# Patient Record
Sex: FEMALE | Race: OTHER | HISPANIC OR LATINO | URBAN - METROPOLITAN AREA
[De-identification: names, ages, dates, MRNs, and addresses within clinical notes are randomized per-mention and may not be internally consistent; named-entity substitution may affect disease eponyms.]

---

## 2018-10-24 PROBLEM — Z00.00 ENCOUNTER FOR PREVENTIVE HEALTH EXAMINATION: Status: ACTIVE | Noted: 2018-10-24

## 2018-10-29 ENCOUNTER — OUTPATIENT (OUTPATIENT)
Dept: OUTPATIENT SERVICES | Facility: HOSPITAL | Age: 53
LOS: 1 days | Discharge: HOME | End: 2018-10-29
Payer: MEDICAID

## 2018-10-31 ENCOUNTER — APPOINTMENT (OUTPATIENT)
Dept: RADIATION ONCOLOGY | Facility: CLINIC | Age: 53
End: 2018-10-31
Payer: MEDICAID

## 2018-10-31 VITALS
BODY MASS INDEX: 26.39 KG/M2 | HEART RATE: 81 BPM | OXYGEN SATURATION: 95 % | HEIGHT: 62 IN | WEIGHT: 143.41 LBS | RESPIRATION RATE: 18 BRPM | SYSTOLIC BLOOD PRESSURE: 117 MMHG | DIASTOLIC BLOOD PRESSURE: 77 MMHG

## 2018-10-31 DIAGNOSIS — Z90.49 ACQUIRED ABSENCE OF OTHER SPECIFIED PARTS OF DIGESTIVE TRACT: ICD-10-CM

## 2018-10-31 DIAGNOSIS — Z78.9 OTHER SPECIFIED HEALTH STATUS: ICD-10-CM

## 2018-10-31 PROCEDURE — 99204 OFFICE O/P NEW MOD 45 MIN: CPT | Mod: 25

## 2018-11-01 ENCOUNTER — OUTPATIENT (OUTPATIENT)
Dept: OUTPATIENT SERVICES | Facility: HOSPITAL | Age: 53
LOS: 1 days | Discharge: HOME | End: 2018-11-01

## 2018-11-01 ENCOUNTER — OUTPATIENT (OUTPATIENT)
Dept: OUTPATIENT SERVICES | Facility: HOSPITAL | Age: 53
LOS: 1 days | End: 2018-11-01
Payer: COMMERCIAL

## 2018-11-01 DIAGNOSIS — C53.9 MALIGNANT NEOPLASM OF CERVIX UTERI, UNSPECIFIED: ICD-10-CM

## 2018-11-02 ENCOUNTER — OTHER (OUTPATIENT)
Age: 53
End: 2018-11-02

## 2018-11-04 PROBLEM — Z90.49 HISTORY OF CHOLECYSTECTOMY: Status: RESOLVED | Noted: 2018-11-04 | Resolved: 2018-11-04

## 2018-11-04 NOTE — VITALS
[Least Pain Intensity: 0/10] : 0/10 [Pain Description/Quality: ___] : Pain description/quality: [unfilled] [Pain Duration: ___] : Pain duration: [unfilled] [Pain Location: ___] : Pain Location: [unfilled] [Pain Interferes with ADLs] : Pain interferes with activities of daily living. [OTC] : OTC [80: Normal activity with effort; some signs or symptoms of disease.] : 80: Normal activity with effort; some signs or symptoms of disease.  [Maximal Pain Intensity: 5/10] : 5/10 [ECOG Performance Status: 2 - Ambulatory and capable of all self care but unable to carry out any work activities] : Performance Status: 2 - Ambulatory and capable of all self care but unable to carry out any work activities. Up and about more than 50% of waking hours

## 2018-11-04 NOTE — REASON FOR VISIT
[Consideration of Curative Therapy] : consideration of curative therapy for [Other: ___] : [unfilled] [Other: _____] : [unfilled]

## 2018-11-04 NOTE — PHYSICAL EXAM
[General Appearance - Alert] : alert [General Appearance - In No Acute Distress] : in no acute distress [Sclera] : the sclera and conjunctiva were normal [Extraocular Movements] : extraocular movements were intact [Heart Sounds] : normal S1 and S2 [Edema] : no peripheral edema present [Abdomen Soft] : soft [Abdomen Tenderness] : non-tender [] : no hepato-splenomegaly [Normal External Genitalia] : normal external genitalia  [Cervical Lymph Nodes Enlarged Posterior Bilaterally] : posterior cervical [Cervical Lymph Nodes Enlarged Anterior Bilaterally] : anterior cervical [Supraclavicular Lymph Nodes Enlarged Bilaterally] : supraclavicular [Axillary Lymph Nodes Enlarged Bilaterally] : axillary [Femoral Lymph Nodes Enlarged Bilaterally] : femoral [Inguinal Lymph Nodes Enlarged Bilaterally] : inguinal [Normal] : normal spine exam without palpable tenderness, no kyphosis or scoliosis [Motor Tone] : muscle strength and tone were normal [No Focal Deficits] : no focal deficits [de-identified] : friable >5 cm cervical mass with at least left pelvic side wall involvement and proximal upper vaginal involvement; rectovaginal exam is negative;

## 2018-11-04 NOTE — HISTORY OF PRESENT ILLNESS
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  She is accompanied by her son Billy.\par \par Patient reports prolonged period in September associated with continued vaginal bleeding, pelvic cramping and sticky yellow discharge with clots.  She went to gynecology clinic and was referred to Dr. Carter for further evaluation.  Pathology of "tissue from cervix" dated 10/12/18 showed squamous cell carcinoma.  On exam by Dr. Carter on 10/18/18, there was a 6 cm firm and friable cervix with possible vaginal involvement at 5:00 and L USL replaced by tumor extending to the side wall, consistent with at least clinical stage IIIB disease.  Dr. Carter referred patient here for further evaluation and management.\par \par Patient reports continuing vaginal staining / discharge, clots, and abdominal / lower left quadrant cramping.  She denies back pain or hematuria or rectal bleeding.  She denies changes in her bowel habits.  She admits to weight loss of at least 10 lbs due to decreased appetite.\par

## 2018-11-04 NOTE — REVIEW OF SYSTEMS
[Dysmenorrhea/Abn Vaginal Bleeding] : dysmenorrhea/abnormal vaginal bleeding [Negative] : Heme/Lymph [Constipation: Grade 0] : Constipation: Grade 0 [Diarrhea: Grade 0] : Diarrhea: Grade 0 [Fatigue: Grade 1 - Fatigue relieved by rest] : Fatigue: Grade 1 - Fatigue relieved by rest [Hematuria: Grade 0] : Hematuria: Grade 0 [Fatigue] : fatigue [Recent Change In Weight] : ~T recent weight change [Abdominal Pain] : abdominal pain [Vaginal Discharge] : vaginal discharge [Fever] : no fever [Chills] : no chills [Night Sweats] : no night sweats [Vomiting] : no vomiting [Constipation] : no constipation [Diarrhea] : no diarrhea [FreeTextEntry2] : weight loss of at least 10 lbs

## 2018-11-05 ENCOUNTER — OUTPATIENT (OUTPATIENT)
Dept: OUTPATIENT SERVICES | Facility: HOSPITAL | Age: 53
LOS: 1 days | Discharge: HOME | End: 2018-11-05

## 2018-11-05 ENCOUNTER — APPOINTMENT (OUTPATIENT)
Dept: HEMATOLOGY ONCOLOGY | Facility: CLINIC | Age: 53
End: 2018-11-05

## 2018-11-05 VITALS
DIASTOLIC BLOOD PRESSURE: 72 MMHG | WEIGHT: 140.85 LBS | HEART RATE: 86 BPM | TEMPERATURE: 98.5 F | RESPIRATION RATE: 18 BRPM | BODY MASS INDEX: 25.77 KG/M2 | OXYGEN SATURATION: 100 % | SYSTOLIC BLOOD PRESSURE: 111 MMHG

## 2018-11-05 DIAGNOSIS — E11.9 TYPE 2 DIABETES MELLITUS W/OUT COMPLICATIONS: ICD-10-CM

## 2018-11-05 DIAGNOSIS — C53.9 MALIGNANT NEOPLASM OF CERVIX UTERI, UNSPECIFIED: ICD-10-CM

## 2018-11-06 PROCEDURE — 77470 SPECIAL RADIATION TREATMENT: CPT | Mod: 26

## 2018-11-07 ENCOUNTER — OUTPATIENT (OUTPATIENT)
Dept: OUTPATIENT SERVICES | Facility: HOSPITAL | Age: 53
LOS: 1 days | End: 2018-11-07
Payer: COMMERCIAL

## 2018-11-07 ENCOUNTER — APPOINTMENT (OUTPATIENT)
Dept: NUCLEAR MEDICINE | Facility: IMAGING CENTER | Age: 53
End: 2018-11-07
Payer: MEDICAID

## 2018-11-07 DIAGNOSIS — C53.9 MALIGNANT NEOPLASM OF CERVIX UTERI, UNSPECIFIED: ICD-10-CM

## 2018-11-07 PROCEDURE — 78815 PET IMAGE W/CT SKULL-THIGH: CPT

## 2018-11-07 PROCEDURE — 78815 PET IMAGE W/CT SKULL-THIGH: CPT | Mod: 26,PI

## 2018-11-07 PROCEDURE — A9552: CPT

## 2018-11-08 ENCOUNTER — RESULT REVIEW (OUTPATIENT)
Age: 53
End: 2018-11-08

## 2018-11-08 PROCEDURE — 88321 CONSLTJ&REPRT SLD PREP ELSWR: CPT

## 2018-11-12 ENCOUNTER — RESULT REVIEW (OUTPATIENT)
Age: 53
End: 2018-11-12

## 2018-11-12 ENCOUNTER — APPOINTMENT (OUTPATIENT)
Dept: HEMATOLOGY ONCOLOGY | Facility: CLINIC | Age: 53
End: 2018-11-12

## 2018-11-12 VITALS
SYSTOLIC BLOOD PRESSURE: 112 MMHG | HEART RATE: 86 BPM | WEIGHT: 141.76 LBS | BODY MASS INDEX: 25.93 KG/M2 | OXYGEN SATURATION: 99 % | DIASTOLIC BLOOD PRESSURE: 66 MMHG | TEMPERATURE: 97.6 F | RESPIRATION RATE: 18 BRPM

## 2018-11-12 LAB
ALBUMIN SERPL ELPH-MCNC: 4.4 G/DL — SIGNIFICANT CHANGE UP (ref 3.3–5)
ALP SERPL-CCNC: 89 U/L — SIGNIFICANT CHANGE UP (ref 30–120)
ALT FLD-CCNC: 11 U/L — SIGNIFICANT CHANGE UP (ref 10–45)
ANION GAP SERPL CALC-SCNC: 14 MMOL/L — SIGNIFICANT CHANGE UP (ref 5–17)
AST SERPL-CCNC: 18 U/L — SIGNIFICANT CHANGE UP (ref 10–40)
BILIRUB SERPL-MCNC: 0.2 MG/DL — SIGNIFICANT CHANGE UP (ref 0.2–1.2)
BUN SERPL-MCNC: 8 MG/DL — SIGNIFICANT CHANGE UP (ref 7–23)
CALCIUM SERPL-MCNC: 9.1 MG/DL — SIGNIFICANT CHANGE UP (ref 8.4–10.5)
CHLORIDE SERPL-SCNC: 101 MMOL/L — SIGNIFICANT CHANGE UP (ref 96–108)
CO2 SERPL-SCNC: 23 MMOL/L — SIGNIFICANT CHANGE UP (ref 22–31)
CREAT SERPL-MCNC: 0.51 MG/DL — SIGNIFICANT CHANGE UP (ref 0.5–1.3)
FERRITIN SERPL-MCNC: 8 NG/ML — LOW (ref 15–150)
GLUCOSE SERPL-MCNC: 92 MG/DL — SIGNIFICANT CHANGE UP (ref 70–99)
HBV CORE AB SER-ACNC: SIGNIFICANT CHANGE UP
HBV SURFACE AB SER-ACNC: SIGNIFICANT CHANGE UP
HBV SURFACE AG SER-ACNC: SIGNIFICANT CHANGE UP
HCT VFR BLD CALC: 32.3 % — LOW (ref 34.5–45)
HCV AB S/CO SERPL IA: 0.12 S/CO — SIGNIFICANT CHANGE UP
HCV AB SERPL-IMP: SIGNIFICANT CHANGE UP
HGB BLD-MCNC: 10.6 G/DL — LOW (ref 11.5–15.5)
IRON SATN MFR SERPL: 24 UG/DL — LOW (ref 30–160)
IRON SATN MFR SERPL: 6 % — LOW (ref 14–50)
MCHC RBC-ENTMCNC: 27.1 PG — SIGNIFICANT CHANGE UP (ref 27–34)
MCHC RBC-ENTMCNC: 32.9 G/DL — SIGNIFICANT CHANGE UP (ref 32–36)
MCV RBC AUTO: 82.2 FL — SIGNIFICANT CHANGE UP (ref 80–100)
PLATELET # BLD AUTO: 344 K/UL — SIGNIFICANT CHANGE UP (ref 150–400)
POTASSIUM SERPL-MCNC: 4.1 MMOL/L — SIGNIFICANT CHANGE UP (ref 3.5–5.3)
POTASSIUM SERPL-SCNC: 4.1 MMOL/L — SIGNIFICANT CHANGE UP (ref 3.5–5.3)
PROT SERPL-MCNC: 7.7 G/DL — SIGNIFICANT CHANGE UP (ref 6–8.3)
RBC # BLD: 3.93 M/UL — SIGNIFICANT CHANGE UP (ref 3.8–5.2)
RBC # FLD: 12.2 % — SIGNIFICANT CHANGE UP (ref 10.3–14.5)
SODIUM SERPL-SCNC: 138 MMOL/L — SIGNIFICANT CHANGE UP (ref 135–145)
TIBC SERPL-MCNC: 430 UG/DL — SIGNIFICANT CHANGE UP (ref 220–430)
UIBC SERPL-MCNC: 406 UG/DL — HIGH (ref 110–370)
WBC # BLD: 9.7 K/UL — SIGNIFICANT CHANGE UP (ref 3.8–10.5)
WBC # FLD AUTO: 9.7 K/UL — SIGNIFICANT CHANGE UP (ref 3.8–10.5)

## 2018-11-12 PROCEDURE — 77263 THER RADIOLOGY TX PLNG CPLX: CPT

## 2018-11-13 LAB — HIV1+2 AB SPEC QL IA.RAPID: NONREACTIVE

## 2018-11-14 NOTE — HISTORY OF PRESENT ILLNESS
[Disease: _____________________] : Disease: [unfilled] [T: ___] : T[unfilled] [N: ___] : N[unfilled] [M: ___] : M[unfilled] [AJCC Stage: ____] : AJCC Stage: [unfilled] [Date: ____________] : Patient's last distress assessment performed on [unfilled]. [9 - Distress Level] : Distress Level: 9 [Referred Patient  to social work for follow-up] : Patient was referred to social work for follow-up [Patient given social work contact information and resource sheet] : Patient was given social work contact information and resource sheet [de-identified] : Gyn: Dr. Emilia Carter\par Radiation: Dr. Yvonne Mcmahan\par \par History taken from patient. Translate by son, Billy. Patient declined [facility] . \par \par Patient has h/o DM on Metformin, who was doing well until 8/2018 when patient had menorrhagia. She had continued bleeding, blood clot, abdominal pain and pelvic cramping with pressure until end of 9/2018 for which she presented to the [facility] ED in [place]. Patient states she had USG abdomen which showed mass, after which she was referred to Gynecologist who noted a friable cervix on examination s/p biopsy, who referred her to Dr. Carter for further evaluation. Pathology of "tissue from cervix" dated 10/12/18 showed squamous cell carcinoma. On exam by Dr. Carter on 10/18/18, there was a 6 cm firm and friable cervix with minute cervical rim at vaginal margin with possible vaginal involvement at 5:00 and L USL replaced by tumor extending to the side wall/ R USL firm, fixed and smooth, consistent with at least clinical stage IIIB disease. Dr. Carter referred patient to rad/onc for management. Patient was evaluated by Dr. Mcmahan and referred her to medical oncology. She had PET scan done on 1/7/18 with no evidence of metastatic disease.   [de-identified] : Squamous cell carcinoma [de-identified] : Patient continues to have pelvic pain and cramps, for which she takes Tylenol which helps with her pain. She also continues to have intermittent vaginal bleeding. She started having menstrual bleeding, which is heavy with clots. She continues to have decreased appetite and has unintentional weight loss of 8-10 lbs in 3 months. She also complaints of fatigue but is able to carry out ADL/IDL activities. \par Bowel and bladder habits are normal. \par  [FreeTextEntry7] : Patient states he has been in contact with SW from rad/onc.

## 2018-11-14 NOTE — ASSESSMENT
[FreeTextEntry1] : Ms. Leggett is a 52 y/o F with h/o DM on Metformin, who was doing well until 8/2018 when patient had menorrhagia. She had continued bleeding, blood clot, abdominal pain and pelvic cramping with pressure until end of 9/2018 for which she presented to the [facility] ED in [place]. Patient states she had USG abdomen which showed mass, after which she was referred to Gynecologist, who referred her to Dr. Carter for further evaluation. Pathology of "tissue from cervix" dated 10/12/18 showed squamous cell carcinoma. On exam by Dr. Carter on 10/18/18, there was a 6 cm firm and friable cervix with possible vaginal involvement at 5:00 and L USL replaced by tumor extending to the side wall, consistent with at least clinical stage IIIB disease. Dr. Carter referred patient to rad/onc for management. Patient was evaluated by Dr. Mcmahan and referred her to medical oncology. Patient had a PET scan which showed no evidence of metastatic disease. \par \par 1. Cervical Cancer:\par -Patient clinical Stage IIIB with left sided pelvic USL extension. PET scan showed no evidence of metastatic disease. D/W patient and son, the pathology findings and plan for concurrent chemoradiation with Cisplatin. Risk, benefit and alternative discussed including but not limited to fatigue, kidney problem, electrolyte imbalance, nausea/vomiting, they agree and consent signed. \par -To follow with Dr. Mcmahan for radiation planning. \par -Continue to follow with Gyn/Onc, patient wants to transfer all her cancer related care at [facility], will refer to gyn/onc, information provided. Patient to make appointment. \par -CBC, CMP, Hepatitis, HIV today.\par -Dietary counseling. \par -Tylenol as needed for pain. \par -Plan discussed and questions answered per their apparent satisfaction. \par \par 2. Anemia:\par -Likely iron deficiency from blood loss. Will get anemia w/u. Start on ferrous sulphate with ascorbic acid.\par -Will plan for IV iron X 2 doses with chemotherapy.\par -Consent obtained.\par \par F/U in 1 week after treatment.\par \par D/W Dr. Mazariegos, following longitudinally with Dr. Menendez.

## 2018-11-14 NOTE — END OF VISIT
[] : Fellow [FreeTextEntry3] : 54yo woman with Stage IIIB with left sided pelvic USL extension, plan for concurrent chemoradiation with Cisplatin. To see rad onc and coordinate initiation of treatment. Labwork today, gyn onc referral, possible IV iron in near future.\par

## 2018-11-14 NOTE — REVIEW OF SYSTEMS
[Fatigue] : fatigue [Recent Change In Weight] : ~T recent weight change [Vaginal Discharge] : vaginal discharge [Dysmenorrhea/Abn Vaginal Bleeding] : dysmenorrhea/abnormal vaginal bleeding [Negative] : Allergic/Immunologic [Fever] : no fever [Chills] : no chills [Night Sweats] : no night sweats [Chest Pain] : no chest pain [Palpitations] : no palpitations [Leg Claudication] : no intermittent leg claudication [Lower Ext Edema] : no lower extremity edema [Shortness Of Breath] : no shortness of breath [Wheezing] : no wheezing [Cough] : no cough [SOB on Exertion] : no shortness of breath during exertion [Abdominal Pain] : no abdominal pain [Vomiting] : no vomiting [Constipation] : no constipation [Diarrhea] : no diarrhea [Dysuria] : no dysuria [Incontinence] : no incontinence

## 2018-11-14 NOTE — RESULTS/DATA
[FreeTextEntry1] : XAM:  PETCT SKUL-THI ONC FDG INIT  \par \par \par PROCEDURE DATE:  11/07/2018  \par  \par \par \par INTERPRETATION:  PROCEDURE:  PET/CT SKULL BASE-MID THIGH IMAGING \par \par CLINICAL INFORMATION: 53-year-old female with newly diagnosed cervical \par squamous cell cancer. Evaluate for metastatic disease. PET/CT is done as \par part of the initial treatment strategy evaluation.\par \par RADIOPHARMACEUTICAL:  9.96 mCi F-18, FDG, I.V.\par \par TECHNIQUE:  Fasting blood sugar measured prior to injection of \par radiopharmaceutical was 92 mg/dl. Following intravenous injection of the \par radiopharmaceutical and an uptake period of approximately 55 minutes, \par FDG-PET/CT was obtained on a Highlighter Discovery 710 from skull base to mid \par thigh. Oral contrast was given during the uptake period. CT was performed \par during shallow respiration. The CT protocol was optimized for PET \par attenuation correction and anatomic localization. The CT protocol was not \par designed to produce and cannot replace state-of-the-art diagnostic CT \par images with specific imaging protocols for different body parts and \par indications. Images were reviewed on a dedicated workstation using \par multiplanar reconstruction.\par \par The standardized uptake values (SUV) are normalized to patient body \par weight and indicate the highest activity concentration (SUVmax) in a \par given disease site. All image numbers refer to axial image number.\par \par COMPARISON:  No prior FDG-PET/CT is available.\par \par OTHER STUDIES USED FOR CORRELATION: None available.\par \par FINDINGS: \par \par HEAD/NECK: Physiologic FDG activity in visualized brain, head, and neck.  \par \par CHEST: No abnormal FDG activity. No lymphadenopathy.     \par \par LUNGS: No abnormal FDG activity. No lung nodule. \par \par PLEURA/PERICARDIUM: No abnormal FDG activity. No effusion.    \par \par HEPATOBILIARY/PANCREAS:  Physiologic FDG activity. Liver SUV mean is 2.1.\par \par SPLEEN: Diffuse, mild probably increased FDG activity in the spleen which \par is more intense than hepatic uptake of FDG (SUV max 4.1; SUV mean 3.1). \par Normal in size.    \par \par ADRENAL GLANDS: No abnormal FDG activity. No nodule.\par \par KIDNEYS/URINARY BLADDER: Physiologic excreted FDG activity.\par \par REPRODUCTIVE ORGANS: A hypermetabolic mass in the uterine cervix measures \par approximately 5.5 cm in maximum transverse diameter (SUV 20.2; image \par 194). Hypermetabolism extends into the lower uterine segment which is not \par well evaluated on CT in the absence of intravenous contrast.\par \par ABDOMINOPELVIC NODES: No enlarged or FDG-avid lymph node.    \par \par BOWEL/PERITONEUM/MESENTERY: Pancolonic hypermetabolism, without \par corresponding abnormality on CT, probably is secondary to metformin.\par \par BONES/SOFT TISSUES: Mild, diffuse bone marrow hypermetabolism in the \par axial and proximal appendicular skeleton, without corresponding \par abnormalities on CT. For reference, L4 demonstrates SUV 4.8 (image 161).\par \par IMPRESSION: Abnormal FDG-PET/CT scan.\par \par 1. Hypermetabolic cervical mass with extension of hypermetabolism into \par lower uterine segment, corresponds to known cervical carcinoma. Pelvic \par MRI may be obtained to further delineate the lesion.\par \par 2. Nonspecific diffuse splenic and bone marrow hypermetabolism may be \par secondary to anemia.\par \par 3. Nonspecific pancolonic hypermetabolism may be secondary to metformin \par therapy.\par \par 4. No evidence of metastatic disease.\par \par ZAKI PELAEZ M.D., NUCLEAR MEDICINE ATTENDING \par This document has been electronically signed. Nov 8 2018 10:58AM\par   \par \par   \par

## 2018-11-14 NOTE — REASON FOR VISIT
[Follow-Up Visit] : a follow-up [Initial Consultation] : an initial consultation [FreeTextEntry2] : Cervical Cancer

## 2018-11-16 ENCOUNTER — INPATIENT (INPATIENT)
Facility: HOSPITAL | Age: 53
LOS: 5 days | Discharge: HOME | End: 2018-11-22
Attending: OBSTETRICS & GYNECOLOGY | Admitting: OBSTETRICS & GYNECOLOGY
Payer: MEDICAID

## 2018-11-16 VITALS
TEMPERATURE: 100 F | HEART RATE: 102 BPM | SYSTOLIC BLOOD PRESSURE: 103 MMHG | OXYGEN SATURATION: 87 % | RESPIRATION RATE: 20 BRPM | DIASTOLIC BLOOD PRESSURE: 54 MMHG

## 2018-11-16 LAB
ALBUMIN SERPL ELPH-MCNC: 3.6 G/DL — SIGNIFICANT CHANGE UP (ref 3.3–5)
ALP SERPL-CCNC: 69 U/L — SIGNIFICANT CHANGE UP (ref 40–120)
ALT FLD-CCNC: 19 U/L — SIGNIFICANT CHANGE UP (ref 4–33)
APTT BLD: 24.7 SEC — LOW (ref 27.5–36.3)
AST SERPL-CCNC: 24 U/L — SIGNIFICANT CHANGE UP (ref 4–32)
BASOPHILS # BLD AUTO: 0.01 K/UL — SIGNIFICANT CHANGE UP (ref 0–0.2)
BASOPHILS NFR BLD AUTO: 0.1 % — SIGNIFICANT CHANGE UP (ref 0–2)
BILIRUB SERPL-MCNC: < 0.2 MG/DL — LOW (ref 0.2–1.2)
BLD GP AB SCN SERPL QL: NEGATIVE — SIGNIFICANT CHANGE UP
BUN SERPL-MCNC: 6 MG/DL — LOW (ref 7–23)
CALCIUM SERPL-MCNC: 8.5 MG/DL — SIGNIFICANT CHANGE UP (ref 8.4–10.5)
CHLORIDE SERPL-SCNC: 103 MMOL/L — SIGNIFICANT CHANGE UP (ref 98–107)
CO2 SERPL-SCNC: 23 MMOL/L — SIGNIFICANT CHANGE UP (ref 22–31)
CREAT SERPL-MCNC: 0.52 MG/DL — SIGNIFICANT CHANGE UP (ref 0.5–1.3)
EOSINOPHIL # BLD AUTO: 0 K/UL — SIGNIFICANT CHANGE UP (ref 0–0.5)
EOSINOPHIL NFR BLD AUTO: 0 % — SIGNIFICANT CHANGE UP (ref 0–6)
GLUCOSE SERPL-MCNC: 131 MG/DL — HIGH (ref 70–99)
HCG SERPL-ACNC: < 5 MIU/ML — SIGNIFICANT CHANGE UP
HCT VFR BLD CALC: 19.8 % — CRITICAL LOW (ref 34.5–45)
HGB BLD-MCNC: 6.5 G/DL — CRITICAL LOW (ref 11.5–15.5)
IMM GRANULOCYTES # BLD AUTO: 0.06 # — SIGNIFICANT CHANGE UP
IMM GRANULOCYTES NFR BLD AUTO: 0.8 % — SIGNIFICANT CHANGE UP (ref 0–1.5)
INR BLD: 1.13 — SIGNIFICANT CHANGE UP (ref 0.88–1.17)
LYMPHOCYTES # BLD AUTO: 1.35 K/UL — SIGNIFICANT CHANGE UP (ref 1–3.3)
LYMPHOCYTES # BLD AUTO: 17.6 % — SIGNIFICANT CHANGE UP (ref 13–44)
MCHC RBC-ENTMCNC: 27.8 PG — SIGNIFICANT CHANGE UP (ref 27–34)
MCHC RBC-ENTMCNC: 32.8 % — SIGNIFICANT CHANGE UP (ref 32–36)
MCV RBC AUTO: 84.6 FL — SIGNIFICANT CHANGE UP (ref 80–100)
MONOCYTES # BLD AUTO: 0.94 K/UL — HIGH (ref 0–0.9)
MONOCYTES NFR BLD AUTO: 12.3 % — SIGNIFICANT CHANGE UP (ref 2–14)
NEUTROPHILS # BLD AUTO: 5.29 K/UL — SIGNIFICANT CHANGE UP (ref 1.8–7.4)
NEUTROPHILS NFR BLD AUTO: 69.2 % — SIGNIFICANT CHANGE UP (ref 43–77)
NRBC # FLD: 0 — SIGNIFICANT CHANGE UP
PLATELET # BLD AUTO: 231 K/UL — SIGNIFICANT CHANGE UP (ref 150–400)
PMV BLD: 10.2 FL — SIGNIFICANT CHANGE UP (ref 7–13)
POTASSIUM SERPL-MCNC: 3.7 MMOL/L — SIGNIFICANT CHANGE UP (ref 3.5–5.3)
POTASSIUM SERPL-SCNC: 3.7 MMOL/L — SIGNIFICANT CHANGE UP (ref 3.5–5.3)
PROT SERPL-MCNC: 6.8 G/DL — SIGNIFICANT CHANGE UP (ref 6–8.3)
PROTHROM AB SERPL-ACNC: 12.6 SEC — SIGNIFICANT CHANGE UP (ref 9.8–13.1)
RBC # BLD: 2.34 M/UL — LOW (ref 3.8–5.2)
RBC # FLD: 13.2 % — SIGNIFICANT CHANGE UP (ref 10.3–14.5)
RH IG SCN BLD-IMP: POSITIVE — SIGNIFICANT CHANGE UP
RH IG SCN BLD-IMP: POSITIVE — SIGNIFICANT CHANGE UP
SODIUM SERPL-SCNC: 136 MMOL/L — SIGNIFICANT CHANGE UP (ref 135–145)
WBC # BLD: 7.65 K/UL — SIGNIFICANT CHANGE UP (ref 3.8–10.5)
WBC # FLD AUTO: 7.65 K/UL — SIGNIFICANT CHANGE UP (ref 3.8–10.5)

## 2018-11-16 RX ORDER — SODIUM CHLORIDE 9 MG/ML
1000 INJECTION, SOLUTION INTRAVENOUS ONCE
Qty: 0 | Refills: 0 | Status: COMPLETED | OUTPATIENT
Start: 2018-11-16 | End: 2018-11-16

## 2018-11-16 RX ORDER — ONDANSETRON 8 MG/1
4 TABLET, FILM COATED ORAL ONCE
Qty: 0 | Refills: 0 | Status: COMPLETED | OUTPATIENT
Start: 2018-11-16 | End: 2018-11-16

## 2018-11-16 RX ORDER — FENTANYL CITRATE 50 UG/ML
50 INJECTION INTRAVENOUS ONCE
Qty: 0 | Refills: 0 | Status: DISCONTINUED | OUTPATIENT
Start: 2018-11-16 | End: 2018-11-16

## 2018-11-16 RX ADMIN — ONDANSETRON 4 MILLIGRAM(S): 8 TABLET, FILM COATED ORAL at 21:33

## 2018-11-16 RX ADMIN — SODIUM CHLORIDE 1000 MILLILITER(S): 9 INJECTION, SOLUTION INTRAVENOUS at 21:33

## 2018-11-16 RX ADMIN — FENTANYL CITRATE 50 MICROGRAM(S): 50 INJECTION INTRAVENOUS at 21:33

## 2018-11-16 NOTE — ED PROVIDER NOTE - OBJECTIVE STATEMENT
53F h/o stage 3 cervical cancer 53F h/o DM and stage 3 cervical cancer dx approx 3 months ago (planning for chemo but has not started yet) p/w heavy vaginal bleeding x 2 days, +clots, a/w dizziness and fatigue. No chest pain, SOB, or syncope. No abd pain, vomiting, or diarrhea. +dysuria, no urinary frequency or urgency. Pt speaks French; offered phone translation services; pt prefers adult son (Sebastian Leggett) present at bedside to translate medical hx.

## 2018-11-16 NOTE — ED PROVIDER NOTE - PHYSICAL EXAMINATION
Gen: Pale, well nourished, A&Ox4, NAD.  ENMT: Airway patent. Moist mucous membranes.  Cardiac: Tachycardic, regular rhythm.  Heart sounds S1, S2.  Respiratory: Breath sounds clear and equal bilaterally. No wheezes/rales/rhonchi.  Abdomen: Abdomen soft, non-distended, no guarding. +suprapubic tenderness.  Musculoskeletal: Atraumatic. Strong peripheral pulses. Extremities are warm and well perfused. No vascular compromise. No CVAT.  Neuro: Alert, follows commands. Speech is clear, fluent, and appropriate. No apparent neuro deficit.  Skin: Skin normal color for race, warm, dry and intact. No evidence of rash.      (chaperone: Paola Lombardo RN): Large external clot, +pooling of blood in vaginal vault. Gen: Pale, A&Ox4, NAD.  ENMT: Airway patent. Moist mucous membranes.  Cardiac: Tachycardic, regular rhythm.  Heart sounds S1, S2.  Respiratory: Breath sounds clear and equal bilaterally. No wheezes/rales/rhonchi.  Abdomen: Abdomen soft, non-distended, no guarding. +suprapubic tenderness.  Musculoskeletal: Atraumatic. Strong peripheral pulses. Extremities are warm and well perfused. No vascular compromise. No CVAT.  Neuro: Alert, follows commands. Speech is clear, fluent, and appropriate. No apparent neuro deficit.  Skin: Skin normal color for race, warm, dry and intact. No evidence of rash.      (chaperone: Paola Lombardo RN): Large external clot, +pooling of blood in vaginal vault.

## 2018-11-16 NOTE — ED ADULT NURSE NOTE - OBJECTIVE STATEMENT
Facilitator RN - Pt. received in room 11, primarily Eritrean speaking and requests son Sebastian Leggett at bedside to translate. Pt. is A&Ox4, ambulatory. Pt. with hx of uterine cancer c/o lower back pain, suprapubic pain, dysuria, and vaginal bleeding with clots that began 2 days ago. Pt. reports saturating about 5-6 pads today accompanied by dizziness, weakness, and nausea. Pt. denies vomiting, diarrhea, chest pain, SOB, fever, chills. Pt. received with 20 gauge IV in left ac placed by EMS. Pt. placed on cardiac monitor. MD Preciado at bedside for evaluation. VS as noted. Report given to primary RN Anna Stokes.

## 2018-11-16 NOTE — ED ADULT TRIAGE NOTE - CHIEF COMPLAINT QUOTE
Patient brought to ER by EMS for c/o lower abdominal and back pain, as well as heavy vaginal bleed. Pt has uterine ca and is being prepped for chemo.

## 2018-11-16 NOTE — ED ADULT NURSE REASSESSMENT NOTE - NS ED NURSE REASSESS COMMENT FT1
pt. a&ox3, appears in NAD, actively bleeding, OB at bedside. awaits T&S results, MD aware. will continue to monitor pt. a&ox3, appears in NAD, actively bleeding, OB at bedside. awaits T&S results, MD aware. will continue to monitor. endorsed to covering RN Crystalla.

## 2018-11-16 NOTE — ED ADULT NURSE NOTE - NSIMPLEMENTINTERV_GEN_ALL_ED
Implemented All Fall Risk Interventions:  [place] to call system. Call bell, personal items and telephone within reach. Instruct patient to call for assistance. Room bathroom lighting operational. Non-slip footwear when patient is off stretcher. Physically safe environment: no spills, clutter or unnecessary equipment. Stretcher in lowest position, wheels locked, appropriate side rails in place. Provide visual cue, wrist band, yellow gown, etc. Monitor gait and stability. Monitor for mental status changes and reorient to person, place, and time. Review medications for side effects contributing to fall risk. Reinforce activity limits and safety measures with patient and family.

## 2018-11-16 NOTE — ED PROVIDER NOTE - MEDICAL DECISION MAKING DETAILS
Pt with known cervical cancer p/w heavy vaginal bleeding, likely bleeding from tumor. Concern for anemia and ongoing bleeding. Plan: labs, hcg, UA, fluids, pRBCs, gyn c/s, admit.

## 2018-11-16 NOTE — ED PROVIDER NOTE - ATTENDING CONTRIBUTION TO CARE
Tate: 53 yof with stage 3 cervical cancer in the prelim stage of chemo/radiation. Pt noted some lower abdominal pain and heavy vaginal bleeding for few days with feeling lightheaded, weak, tired, sob with exertion, no palpitations, no cp. Pt initially tachy to 100, BP stable, pale palms and conj, clear lungs, normal cardiac, abd soft with minimal tn suprapubic, pelvic as above. GYN at bedside now. - hg 6.5, transfusion ordered. awaiting recs.

## 2018-11-17 DIAGNOSIS — N93.9 ABNORMAL UTERINE AND VAGINAL BLEEDING, UNSPECIFIED: ICD-10-CM

## 2018-11-17 DIAGNOSIS — C53.9 MALIGNANT NEOPLASM OF CERVIX UTERI, UNSPECIFIED: ICD-10-CM

## 2018-11-17 LAB
ALBUMIN SERPL ELPH-MCNC: 2.9 G/DL — LOW (ref 3.3–5)
ALP SERPL-CCNC: 59 U/L — SIGNIFICANT CHANGE UP (ref 40–120)
ALT FLD-CCNC: 15 U/L — SIGNIFICANT CHANGE UP (ref 4–33)
APTT BLD: 22.9 SEC — LOW (ref 27.5–36.3)
AST SERPL-CCNC: 22 U/L — SIGNIFICANT CHANGE UP (ref 4–32)
BILIRUB SERPL-MCNC: 0.4 MG/DL — SIGNIFICANT CHANGE UP (ref 0.2–1.2)
BUN SERPL-MCNC: 5 MG/DL — LOW (ref 7–23)
CALCIUM SERPL-MCNC: 7.5 MG/DL — LOW (ref 8.4–10.5)
CHLORIDE SERPL-SCNC: 105 MMOL/L — SIGNIFICANT CHANGE UP (ref 98–107)
CO2 SERPL-SCNC: 23 MMOL/L — SIGNIFICANT CHANGE UP (ref 22–31)
CREAT SERPL-MCNC: 0.54 MG/DL — SIGNIFICANT CHANGE UP (ref 0.5–1.3)
GLUCOSE BLDC GLUCOMTR-MCNC: 110 MG/DL — HIGH (ref 70–99)
GLUCOSE BLDC GLUCOMTR-MCNC: 122 MG/DL — HIGH (ref 70–99)
GLUCOSE BLDC GLUCOMTR-MCNC: 130 MG/DL — HIGH (ref 70–99)
GLUCOSE SERPL-MCNC: 113 MG/DL — HIGH (ref 70–99)
HCT VFR BLD CALC: 26.3 % — LOW (ref 34.5–45)
HCT VFR BLD CALC: 27.3 % — LOW (ref 34.5–45)
HCT VFR BLD CALC: 28.7 % — LOW (ref 34.5–45)
HCT VFR BLD CALC: 28.9 % — LOW (ref 34.5–45)
HCT VFR BLD CALC: 31.2 % — LOW (ref 34.5–45)
HGB BLD-MCNC: 10.3 G/DL — LOW (ref 11.5–15.5)
HGB BLD-MCNC: 9 G/DL — LOW (ref 11.5–15.5)
HGB BLD-MCNC: 9.4 G/DL — LOW (ref 11.5–15.5)
HGB BLD-MCNC: 9.6 G/DL — LOW (ref 11.5–15.5)
HGB BLD-MCNC: 9.8 G/DL — LOW (ref 11.5–15.5)
INR BLD: 1.07 — SIGNIFICANT CHANGE UP (ref 0.88–1.17)
MAGNESIUM SERPL-MCNC: 1.6 MG/DL — SIGNIFICANT CHANGE UP (ref 1.6–2.6)
MCHC RBC-ENTMCNC: 27.5 PG — SIGNIFICANT CHANGE UP (ref 27–34)
MCHC RBC-ENTMCNC: 27.6 PG — SIGNIFICANT CHANGE UP (ref 27–34)
MCHC RBC-ENTMCNC: 27.7 PG — SIGNIFICANT CHANGE UP (ref 27–34)
MCHC RBC-ENTMCNC: 27.8 PG — SIGNIFICANT CHANGE UP (ref 27–34)
MCHC RBC-ENTMCNC: 27.9 PG — SIGNIFICANT CHANGE UP (ref 27–34)
MCHC RBC-ENTMCNC: 33 % — SIGNIFICANT CHANGE UP (ref 32–36)
MCHC RBC-ENTMCNC: 33.4 % — SIGNIFICANT CHANGE UP (ref 32–36)
MCHC RBC-ENTMCNC: 33.9 % — SIGNIFICANT CHANGE UP (ref 32–36)
MCHC RBC-ENTMCNC: 34.2 % — SIGNIFICANT CHANGE UP (ref 32–36)
MCHC RBC-ENTMCNC: 34.4 % — SIGNIFICANT CHANGE UP (ref 32–36)
MCV RBC AUTO: 80.5 FL — SIGNIFICANT CHANGE UP (ref 80–100)
MCV RBC AUTO: 81.4 FL — SIGNIFICANT CHANGE UP (ref 80–100)
MCV RBC AUTO: 81.9 FL — SIGNIFICANT CHANGE UP (ref 80–100)
MCV RBC AUTO: 82.5 FL — SIGNIFICANT CHANGE UP (ref 80–100)
MCV RBC AUTO: 83.2 FL — SIGNIFICANT CHANGE UP (ref 80–100)
NRBC # FLD: 0 — SIGNIFICANT CHANGE UP
PHOSPHATE SERPL-MCNC: 3.8 MG/DL — SIGNIFICANT CHANGE UP (ref 2.5–4.5)
PLATELET # BLD AUTO: 160 K/UL — SIGNIFICANT CHANGE UP (ref 150–400)
PLATELET # BLD AUTO: 166 K/UL — SIGNIFICANT CHANGE UP (ref 150–400)
PLATELET # BLD AUTO: 172 K/UL — SIGNIFICANT CHANGE UP (ref 150–400)
PLATELET # BLD AUTO: 179 K/UL — SIGNIFICANT CHANGE UP (ref 150–400)
PLATELET # BLD AUTO: 181 K/UL — SIGNIFICANT CHANGE UP (ref 150–400)
PMV BLD: 10.1 FL — SIGNIFICANT CHANGE UP (ref 7–13)
PMV BLD: 10.2 FL — SIGNIFICANT CHANGE UP (ref 7–13)
PMV BLD: 10.2 FL — SIGNIFICANT CHANGE UP (ref 7–13)
PMV BLD: 10.6 FL — SIGNIFICANT CHANGE UP (ref 7–13)
PMV BLD: 9.9 FL — SIGNIFICANT CHANGE UP (ref 7–13)
POTASSIUM SERPL-MCNC: 3.8 MMOL/L — SIGNIFICANT CHANGE UP (ref 3.5–5.3)
POTASSIUM SERPL-SCNC: 3.8 MMOL/L — SIGNIFICANT CHANGE UP (ref 3.5–5.3)
PROT SERPL-MCNC: 5.3 G/DL — LOW (ref 6–8.3)
PROTHROM AB SERPL-ACNC: 12.2 SEC — SIGNIFICANT CHANGE UP (ref 9.8–13.1)
RBC # BLD: 3.23 M/UL — LOW (ref 3.8–5.2)
RBC # BLD: 3.39 M/UL — LOW (ref 3.8–5.2)
RBC # BLD: 3.48 M/UL — LOW (ref 3.8–5.2)
RBC # BLD: 3.53 M/UL — LOW (ref 3.8–5.2)
RBC # BLD: 3.75 M/UL — LOW (ref 3.8–5.2)
RBC # FLD: 14.1 % — SIGNIFICANT CHANGE UP (ref 10.3–14.5)
RBC # FLD: 14.3 % — SIGNIFICANT CHANGE UP (ref 10.3–14.5)
RBC # FLD: 14.6 % — HIGH (ref 10.3–14.5)
RBC # FLD: 14.6 % — HIGH (ref 10.3–14.5)
RBC # FLD: 14.9 % — HIGH (ref 10.3–14.5)
SODIUM SERPL-SCNC: 138 MMOL/L — SIGNIFICANT CHANGE UP (ref 135–145)
WBC # BLD: 10.2 K/UL — SIGNIFICANT CHANGE UP (ref 3.8–10.5)
WBC # BLD: 12.41 K/UL — HIGH (ref 3.8–10.5)
WBC # BLD: 8.08 K/UL — SIGNIFICANT CHANGE UP (ref 3.8–10.5)
WBC # BLD: 8.3 K/UL — SIGNIFICANT CHANGE UP (ref 3.8–10.5)
WBC # BLD: 9.32 K/UL — SIGNIFICANT CHANGE UP (ref 3.8–10.5)
WBC # FLD AUTO: 10.2 K/UL — SIGNIFICANT CHANGE UP (ref 3.8–10.5)
WBC # FLD AUTO: 12.41 K/UL — HIGH (ref 3.8–10.5)
WBC # FLD AUTO: 8.08 K/UL — SIGNIFICANT CHANGE UP (ref 3.8–10.5)
WBC # FLD AUTO: 8.3 K/UL — SIGNIFICANT CHANGE UP (ref 3.8–10.5)
WBC # FLD AUTO: 9.32 K/UL — SIGNIFICANT CHANGE UP (ref 3.8–10.5)

## 2018-11-17 PROCEDURE — 36247 INS CATH ABD/L-EXT ART 3RD: CPT | Mod: 59

## 2018-11-17 PROCEDURE — 37244 VASC EMBOLIZE/OCCLUDE BLEED: CPT

## 2018-11-17 PROCEDURE — 76937 US GUIDE VASCULAR ACCESS: CPT | Mod: 26

## 2018-11-17 PROCEDURE — 99232 SBSQ HOSP IP/OBS MODERATE 35: CPT

## 2018-11-17 PROCEDURE — 99223 1ST HOSP IP/OBS HIGH 75: CPT

## 2018-11-17 RX ORDER — INSULIN LISPRO 100/ML
VIAL (ML) SUBCUTANEOUS EVERY 6 HOURS
Qty: 0 | Refills: 0 | Status: DISCONTINUED | OUTPATIENT
Start: 2018-11-17 | End: 2018-11-17

## 2018-11-17 RX ORDER — ACETAMINOPHEN 500 MG
1000 TABLET ORAL ONCE
Qty: 0 | Refills: 0 | Status: COMPLETED | OUTPATIENT
Start: 2018-11-17 | End: 2018-11-17

## 2018-11-17 RX ORDER — IBUPROFEN 200 MG
600 TABLET ORAL ONCE
Qty: 0 | Refills: 0 | Status: COMPLETED | OUTPATIENT
Start: 2018-11-17 | End: 2018-11-17

## 2018-11-17 RX ORDER — HYDROMORPHONE HYDROCHLORIDE 2 MG/ML
1 INJECTION INTRAMUSCULAR; INTRAVENOUS; SUBCUTANEOUS ONCE
Qty: 0 | Refills: 0 | Status: DISCONTINUED | OUTPATIENT
Start: 2018-11-17 | End: 2018-11-17

## 2018-11-17 RX ORDER — HYDROMORPHONE HYDROCHLORIDE 2 MG/ML
0.5 INJECTION INTRAMUSCULAR; INTRAVENOUS; SUBCUTANEOUS ONCE
Qty: 0 | Refills: 0 | Status: DISCONTINUED | OUTPATIENT
Start: 2018-11-17 | End: 2018-11-17

## 2018-11-17 RX ORDER — INSULIN LISPRO 100/ML
VIAL (ML) SUBCUTANEOUS AT BEDTIME
Qty: 0 | Refills: 0 | Status: DISCONTINUED | OUTPATIENT
Start: 2018-11-17 | End: 2018-11-22

## 2018-11-17 RX ORDER — MAGNESIUM SULFATE 500 MG/ML
2 VIAL (ML) INJECTION ONCE
Qty: 0 | Refills: 0 | Status: COMPLETED | OUTPATIENT
Start: 2018-11-17 | End: 2018-11-17

## 2018-11-17 RX ORDER — INSULIN LISPRO 100/ML
VIAL (ML) SUBCUTANEOUS
Qty: 0 | Refills: 0 | Status: DISCONTINUED | OUTPATIENT
Start: 2018-11-17 | End: 2018-11-22

## 2018-11-17 RX ORDER — ENOXAPARIN SODIUM 100 MG/ML
40 INJECTION SUBCUTANEOUS DAILY
Qty: 0 | Refills: 0 | Status: DISCONTINUED | OUTPATIENT
Start: 2018-11-17 | End: 2018-11-22

## 2018-11-17 RX ORDER — SODIUM CHLORIDE 9 MG/ML
1000 INJECTION, SOLUTION INTRAVENOUS
Qty: 0 | Refills: 0 | Status: DISCONTINUED | OUTPATIENT
Start: 2018-11-17 | End: 2018-11-18

## 2018-11-17 RX ADMIN — Medication 400 MILLIGRAM(S): at 12:15

## 2018-11-17 RX ADMIN — Medication 600 MILLIGRAM(S): at 17:09

## 2018-11-17 RX ADMIN — HYDROMORPHONE HYDROCHLORIDE 1 MILLIGRAM(S): 2 INJECTION INTRAMUSCULAR; INTRAVENOUS; SUBCUTANEOUS at 07:28

## 2018-11-17 RX ADMIN — HYDROMORPHONE HYDROCHLORIDE 1 MILLIGRAM(S): 2 INJECTION INTRAMUSCULAR; INTRAVENOUS; SUBCUTANEOUS at 06:41

## 2018-11-17 RX ADMIN — HYDROMORPHONE HYDROCHLORIDE 0.5 MILLIGRAM(S): 2 INJECTION INTRAMUSCULAR; INTRAVENOUS; SUBCUTANEOUS at 03:00

## 2018-11-17 RX ADMIN — ENOXAPARIN SODIUM 40 MILLIGRAM(S): 100 INJECTION SUBCUTANEOUS at 17:09

## 2018-11-17 RX ADMIN — Medication 50 GRAM(S): at 04:14

## 2018-11-17 RX ADMIN — HYDROMORPHONE HYDROCHLORIDE 0.5 MILLIGRAM(S): 2 INJECTION INTRAMUSCULAR; INTRAVENOUS; SUBCUTANEOUS at 02:53

## 2018-11-17 RX ADMIN — Medication 1000 MILLIGRAM(S): at 12:30

## 2018-11-17 RX ADMIN — Medication 400 MILLIGRAM(S): at 23:12

## 2018-11-17 RX ADMIN — Medication 600 MILLIGRAM(S): at 17:37

## 2018-11-17 RX ADMIN — Medication 1000 MILLIGRAM(S): at 23:27

## 2018-11-17 NOTE — PROGRESS NOTE ADULT - ATTENDING COMMENTS
Seen on rounds; son present. Pt looks better, able to sit up. Flow and labs reviewed. Exam - NAD, Pad check; scant droplet noted. Plan cont hairston and obs. Facilitate RT/CT treatment schedule. May need additional RBC to optimize therapy. All Q/A of son, including pain management.

## 2018-11-17 NOTE — H&P ADULT - NSHPLABSRESULTS_GEN_ALL_CORE
EXAM:  PETCT [facility] ONC FDG INIT     PROCEDURE DATE:  11/07/2018       INTERPRETATION:  PROCEDURE:  PET/CT SKULL BASE-MID THIGH IMAGING     CLINICAL INFORMATION: 53-year-old female with newly diagnosed cervical   squamous cell cancer. Evaluate for metastatic disease. PET/CT is done as   part of the initial treatment strategy evaluation.    RADIOPHARMACEUTICAL:  9.96 mCi F-18, FDG, I.V.    TECHNIQUE:  Fasting blood sugar measured prior to injection of   radiopharmaceutical was 92 mg/dl.Following intravenous injection of the   radiopharmaceutical and an uptake period of approximately 55 minutes,   FDG-PET/CT was obtained on a [employer] Discovery 710 from skull base to mid   thigh. Oral contrast was given during the uptake period. CT was performed   during shallow respiration. The CT protocol was optimized for PET   attenuation correction and anatomic localization. The CT protocol was not   designed to produce and cannot replace state-of-the-art diagnostic CT   images with specific imaging protocols for different body parts and   indications. Images were reviewed on a dedicated workstation using   multiplanar reconstruction.    The standardized uptake values (SUV) are normalized to patient body   weight and indicate the highest activity concentration (SUVmax) in a   given disease site. All image numbers refer to axial image number.    COMPARISON:  No prior FDG-PET/CT is available.    OTHER STUDIES USED FOR CORRELATION: None available.    FINDINGS:     HEAD/NECK: Physiologic FDG activity in visualized brain, head, and neck.      CHEST: No abnormal FDG activity. No lymphadenopathy.         LUNGS: No abnormal FDG activity. No lung nodule.     PLEURA/PERICARDIUM: No abnormal FDG activity. No effusion.        HEPATOBILIARY/PANCREAS:  Physiologic FDG activity. Liver SUV mean is 2.1.    SPLEEN: Diffuse, mild probably increased FDG activity in the spleen which   is more intense than hepatic uptake of FDG (SUV max 4.1; SUV mean 3.1).   Normal in size.        ADRENAL GLANDS: No abnormal FDG activity. No nodule.    KIDNEYS/URINARY BLADDER: Physiologic excreted FDG activity.    REPRODUCTIVE ORGANS: A hypermetabolic mass in the uterine cervix measures   approximately 5.5 cm in maximum transverse diameter (SUV 20.2; image   194). Hypermetabolism extends into the lower uterine segment which is not   well evaluated on CT in the absence of intravenous contrast.    ABDOMINOPELVIC NODES: No enlarged or FDG-avid lymph node.        BOWEL/PERITONEUM/MESENTERY: Pancolonic hypermetabolism, without   corresponding abnormality on CT, probably is secondary to metformin.    BONES/SOFT TISSUES: Mild, diffuse bone marrow hypermetabolism in the   axial and proximal appendicular skeleton, without corresponding   abnormalities on CT. For reference, L4 demonstrates SUV 4.8 (image 161).    IMPRESSION: Abnormal FDG-PET/CT scan.    1. Hypermetabolic cervical mass with extension of hypermetabolism into   lower uterine segment, corresponds to known cervical carcinoma. Pelvic   MRI may be obtained to further delineate the lesion.    2. Nonspecific diffuse splenic and bone marrow hypermetabolism may be   secondary to anemia.    3. Nonspecific pancolonic hypermetabolism may be secondary to metformin   therapy.    4. No evidence of metastatic disease.

## 2018-11-17 NOTE — PROGRESS NOTE ADULT - SUBJECTIVE AND OBJECTIVE BOX
R2 GYN Oncology Note    Pt seen and examined at bedside. Pt's son present and able to translate. Pt states that she has b/l lower abdominal cramping that radiates to her thighs b/l, similar to "contraction pain." Pain is moderately controlled with IV pain medication. Pt has not ambulated, NPO, hairston in place.   Pt denies fever, chills, chest pain, SOB, nausea, vomiting, lightheadedness, dizziness.      T(F): 98.3 (11-17-18 @ 04:00), Max: 99.6 (11-16-18 @ 19:46)  HR: 68 (11-17-18 @ 06:00) (66 - 113)  BP: 112/52 (11-17-18 @ 06:00) (101/49 - 138/55)  RR: 17 (11-17-18 @ 06:00) (14 - 22)  SpO2: 98% (11-17-18 @ 06:00) (87% - 100%)  Wt(kg): --  I&O's Summary    16 Nov 2018 07:01  -  17 Nov 2018 07:00  --------------------------------------------------------  IN: 500 mL / OUT: 400 mL / NET: 100 mL        MEDICATIONS  (STANDING):  insulin lispro (HumaLOG) corrective regimen sliding scale   SubCutaneous every 6 hours  lactated ringers. 1000 milliLiter(s) (125 mL/Hr) IV Continuous <Continuous>    MEDICATIONS  (PRN):      Physical Exam:  Constitutional: NAD, sleepy  Pulmonary: clear to auscultation bilaterally   Cardiovascular: Regular rate and rhythm   Abdomen: Soft, tenderness to palpation in LLQ and RLQ b/l, notdistended, no guarding, no rebound, + bowel sounds  Extremities: Dressing in R femoral region c/d/i. no lower extremity edema or calf tenderness. 1+ dorsal pedal pulse in RLE, 1+ dorsal pedal pulse in LLE SCDs in place.   : Scant vaginal spotting on cleopatra and blue towel. Per nurse, pad has not been changed since admission to SICU    LABS:                        10.3   12.41 )-----------( 179      ( 17 Nov 2018 02:45 )             31.2   baso x      eos x      imm gran x      lymph x      mono x      poly x                            6.5    7.65  )-----------( 231      ( 16 Nov 2018 21:15 )             19.8   baso 0.1    eos 0.0    imm gran 0.8    lymph 17.6   mono 12.3   poly 69.2     11-17 @ 02:45    138  |  105  |  5   ----------------------------<  113  3.8   |  23  |  0.54    11-16 @ 21:15    136  |  103  |  6   ----------------------------<  131  3.7   |  23  |  0.52      Phos  3.8     11-17 @ 02:45  Mg     1.6     11-17 @ 02:45    TPro  5.3  /  Alb  2.9  /  TBili  0.4  /  DBili  x   /  AST  22  /  ALT  15  /  AlkPhos  59  11-17 @ 02:45  TPro  6.8  /  Alb  3.6  /  TBili  < 0.2  /  DBili  x   /  AST  24  /  ALT  19  /  AlkPhos  69  11-16 @ 21:15    PT/INR - ( 17 Nov 2018 02:45 )   PT: 12.2 SEC;   INR: 1.07          PTT - ( 17 Nov 2018 02:45 )  PTT:22.9 SEC      RADIOLOGY & ADDITIONAL TESTS:

## 2018-11-17 NOTE — CONSULT NOTE ADULT - ATTENDING COMMENTS
Pt seen, examined and d/w fellow. Agree with above A/P. Recently diagnosed stage IIIB cervical cancer admitted for semiacute vaginal hemorrhge; s/p IR  embolization with what seems to be a good result. PE unremarkable except for min suprapubic tenderness. Plan from the med onc perspective would be to stabilize and upon d/c will coordinate with rad onc to begin concomitant cisplating and XRT

## 2018-11-17 NOTE — H&P ADULT - HISTORY OF PRESENT ILLNESS
Patient is a 52 y/o F with PMH DM and recently diagnosed stage IIIB cervical cancer. Patient states that she has otherwise been well until yesterday when she started having heavy vaginal bleeding with clots soaking through several pads an hour. Today the bleeding continued and she began to experience lightheadedness, weakness and dizziness at which point her sons bought her to [facility] for evaluation.     Patient with vaginal bleeding in september of 2018, evaluated by GYN in [place] found to have cervical mass showing squamous cell carcinoma on biopsy. Patient referred to [facility] for continued evaluation and treatment. Patient underwent PET scan in preparation for Radiation therapy showing locally advanced disease with no metastasis and was planning to begin radiation therapy in two weeks.    PMHx: DM III  Meds: metformin, iron, reglan, colace  NKDA  PSHx: denies  Social: denies toxic habits  Fam Hx: Patient is a 54 y/o F with PMH DM and recently diagnosed stage IIIB cervical cancer. Patient states that she has otherwise been well until yesterday when she started having heavy vaginal bleeding with clots soaking through several pads an hour. Today the bleeding continued and she began to experience lightheadedness, weakness and dizziness at which point her sons bought her to [facility] for evaluation.     Patient with vaginal bleeding in september of 2018, evaluated by GYN in [place] found to have cervical mass showing squamous cell carcinoma on biopsy. Patient referred to [facility] for continued evaluation and treatment. Patient underwent PET scan in preparation for Radiation therapy showing locally advanced disease with no metastasis and was planning to begin radiation therapy in two weeks.    PMHx: DM III  Meds: metformin, iron, reglan, colace  All: amoxicillin  PSHx: cholecystectomy  Social: denies toxic habits

## 2018-11-17 NOTE — ED ADULT NURSE REASSESSMENT NOTE - NS ED NURSE REASSESS COMMENT FT1
rec'd pt. a&ox3, transfusion in progress. OB to bring pt. now in IR. float JIM Mcknight with pt. upon transport. ANM aware. rec'd pt. a&ox3, transfusion in progress. FC noted to bedside drainage inserted by GYN as per covering JIM Porter. OB to bring pt. now in IR. float JIM Mcknight with pt. upon transport. ANM aware.

## 2018-11-17 NOTE — ED ADULT NURSE REASSESSMENT NOTE - NS ED NURSE REASSESS COMMENT FT1
Pt transported to IR with Blood transfusion on-going, accompanied with OB Wade BELLE. Pt. endorsed to IR staff, and RN, that the Blood transfusion just finished and no reaction noted, but need to get the vitals signs 15 minutes after the Blood transfusion, and placed it on the paper.

## 2018-11-17 NOTE — CONSULT NOTE ADULT - ASSESSMENT
ASSESSMENT:  53y Female stage IIIB cervical cancer with vaginal bleeding s/p bilateral uterine artery embolization.     PLAN:  Neurologic:   - pain control as needed    Respiratory:   - stable on room air  - monitor O2 saturation    Cardiovascular:   - Blood pressure currently stable off pressors, monitor vitals    Gastrointestinal/Nutrition:   - NPO for now    Renal/Genitourinary:   - hairston catheter in place; monitor I/Os    Hematologic: s/p 2 pRBCS  - trend H/H, q4h  - monitor for increased vaginal bleeding    Infectious Disease: No active issue    Endocrine: History of diabetes  - Monitor fingersticks; goal blood glucose less than 185    Disposition: SICU  --------------------------------------------------------------------------------------

## 2018-11-17 NOTE — PROGRESS NOTE ADULT - ASSESSMENT
53y w PMH of Stage IIIB cervical cancer p/w vaginal bleeding s/p b/l UAE (11/17), HD#1    Neuro: pain meds prn  CV: Hemodynamically stable, HR down to 60s-70s (from 100s-110s in ED), s/p 3u pRBCs  Pulm: Saturating well on room air, encourage incentive spirometry  GI: NPO  : C/w hairston catheter, monitor I&Os  Heme: c/w SCDs for DVT ppx  Endo: hx of DM2, c/w ISS  Dispo: Appreciate SICU care    Pt seen w N Sample PGY4  ROCKY Sofia PGY2 53y w PMH of Stage IIIB cervical cancer p/w vaginal bleeding s/p b/l UAE (11/17), HD#1    Neuro: pain meds prn  CV: Hemodynamically stable, HR down to 60s-70s (from 100s-110s in ED), s/p 3u pRBCs  Pulm: Saturating well on room air, encourage incentive spirometry  GI: NPO  : C/w hairston catheter, monitor I&Os  Heme: c/w SCDs for DVT ppx  Endo: hx of DM2, c/w ISS  Dispo: Appreciate SICU care, f/u w heme onc and rad onc recs    Pt seen w N Sample PGY4  ROCKY Sofia PGY2

## 2018-11-17 NOTE — H&P ADULT - NSHPPHYSICALEXAM_GEN_ALL_CORE
Vital Signs Last 24 Hrs  T(C): 37.5 (16 Nov 2018 23:35), Max: 37.6 (16 Nov 2018 19:46)  T(F): 99.5 (16 Nov 2018 23:35), Max: 99.6 (16 Nov 2018 19:46)  HR: 103 (16 Nov 2018 23:35) (100 - 113)  BP: 138/55 (16 Nov 2018 23:35) (103/54 - 138/55)  RR: 15 (16 Nov 2018 23:35) (14 - 20)  SpO2: 100% (16 Nov 2018 23:35) (87% - 100%)    Gen: lethargic, pale  Abd: soft, non tender  : active vaginal bleeding with clots, hairston cathter placed and vaginal packing x2 inserted   Ext: NTTP, no edema

## 2018-11-17 NOTE — H&P ADULT - ASSESSMENT
A/P: 54 y/o F with stage IIIB cervical carcinoma admitted with active vaginal bleeding and symptomatic anemia        N Sample, PGY4  pt seen and evaluated with Dr. Farrell A/P: 52 y/o F with stage IIIB cervical carcinoma admitted with active vaginal bleeding and symptomatic anemia  -patient with continued vaginal bleeding despite vaginal packing, transported directly to interventional radiology for angiogram and possible embolization  -continue to monitor VS and UOP  -serial CBCs  -Heme/Onc and Rad/Onc fellows notified of patient admission  -SICU consulted for disposition after IR procedure    N Sample, PGY4  pt seen and evaluated with Dr. Farrell

## 2018-11-17 NOTE — CHART NOTE - NSCHARTNOTEFT_GEN_A_CORE
Spoke with Dr Ingris TORERS earlier re pt.   I reviewed the pt's [employer] chart (RO and MO notes, path, and PET report).  I spoke with the IR attending regarding my assessment that the pt requires an acute intervention for control of bleeding that appears to be from her cervical cancer. She has been packed (with placement of hairston-clear) and transfusion begun. VS s/f tachycardia. Operative intervention not indicated. High dose RT fraction less optimal time frame.  In ED pt examined, sons present (one son translated at their request). Packing in place but still with blood around pack. Communicated to IR team that is preparing for the procedure.  I explained the the pt/family the circumstance and my recs as well as the plan and nature of the procedure. Disc that the procedure may not completely stop the bleeding and that beginning RT sooner may also be necessary.   All Q/A.  Card provided.   Case d/w ED attending. Informed Med Onc and Rad Onc.   In process of determining post-procedure disposition. Plan SICU consult.    LOGAN

## 2018-11-17 NOTE — ED ADULT NURSE REASSESSMENT NOTE - NS ED NURSE REASSESS COMMENT FT1
pt. is currently in IR, admitted to OB-GYN. as per OB Resident Kimberlyn, pt. will not come back to ED after IR. tried to give report to IR RN, nobody picking up in IR. Charge RN made aware.

## 2018-11-17 NOTE — CONSULT NOTE ADULT - SUBJECTIVE AND OBJECTIVE BOX
HPI:  Patient is a 54 y/o F with PMH DM and recently diagnosed stage IIIB cervical cancer who presents with vaginal bleeding with clots.    Patient first started experiencing vaginal bleeding in September of 2018. Patient went to [facility] and states she had USG abdomen which showed mass, after which she was referred to Gynecologist who noted a friable cervix on examination s/p biopsy, who referred her to Dr. Carter for further evaluation. Pathology of "tissue from cervix" dated 10/12/18 showed squamous cell carcinoma. On exam by Dr. Carter on 10/18/18, there was a 6 cm firm and friable cervix with minute cervical rim at vaginal margin with possible vaginal involvement at 5:00 and L USL replaced by tumor extending to the side wall/ R USL firm, fixed and smooth, consistent with at least clinical stage IIIB disease. Dr. Carter referred patient to rad/onc for management. Patient was evaluated by Dr. Mcmahan and referred her to medical oncology. She had PET scan done on 1/7/18 with no evidence of metastatic disease. Plan was to start concurrent chemoradiation with Cisplatin.      PMHx: DM III  Meds: metformin, iron, reglan, colace  All: amoxicillin  PSHx: cholecystectomy  Social: denies toxic habits (17 Nov 2018 00:08)      Allergies    amoxicillin (Unknown)    Intolerances        MEDICATIONS  (STANDING):  insulin lispro (HumaLOG) corrective regimen sliding scale   SubCutaneous every 6 hours  lactated ringers. 1000 milliLiter(s) (125 mL/Hr) IV Continuous <Continuous>    MEDICATIONS  (PRN):      PAST MEDICAL & SURGICAL HISTORY:  DM (diabetes mellitus)  Cervical cancer      FAMILY HISTORY:      SOCIAL HISTORY: No EtOH, no tobacco    REVIEW OF SYSTEMS:    CONSTITUTIONAL: No weakness, fevers or chills  EYES/ENT: No visual changes;  No vertigo or throat pain   NECK: No pain or stiffness  RESPIRATORY: No cough, wheezing, hemoptysis; No shortness of breath  CARDIOVASCULAR: No chest pain or palpitations  GASTROINTESTINAL: No abdominal or epigastric pain. No nausea, vomiting, or hematemesis; No diarrhea or constipation. No melena or hematochezia.  GENITOURINARY: No dysuria, frequency or hematuria  NEUROLOGICAL: No numbness or weakness  SKIN: No itching, burning, rashes, or lesions   All other review of systems is negative unless indicated above.    Height (cm): 152.4 (11-17 @ 02:45)  Weight (kg): 64.5 (11-17 @ 02:45)  BMI (kg/m2): 27.8 (11-17 @ 02:45)  BSA (m2): 1.61 (11-17 @ 02:45)    T(F): 98.8 (11-17-18 @ 08:00), Max: 99.6 (11-16-18 @ 19:46)  HR: 70 (11-17-18 @ 08:00)  BP: 100/50 (11-17-18 @ 08:00)  RR: 15 (11-17-18 @ 08:00)  SpO2: 97% (11-17-18 @ 08:00)  Wt(kg): --    GENERAL: NAD, well-developed  HEAD:  Atraumatic, Normocephalic  EYES: EOMI, PERRLA, conjunctiva and sclera clear  NECK: Supple, No JVD  CHEST/LUNG: Clear to auscultation bilaterally; No wheeze  HEART: Regular rate and rhythm; No murmurs, rubs, or gallops  ABDOMEN: Soft, Nontender, Nondistended; Bowel sounds present  EXTREMITIES:  2+ Peripheral Pulses, No clubbing, cyanosis, or edema  NEUROLOGY: non-focal  SKIN: No rashes or lesions                          9.8    10.20 )-----------( 181      ( 17 Nov 2018 06:20 )             28.9       11-17    138  |  105  |  5<L>  ----------------------------<  113<H>  3.8   |  23  |  0.54    Ca    7.5<L>      17 Nov 2018 02:45  Phos  3.8     11-17  Mg     1.6     11-17    TPro  5.3<L>  /  Alb  2.9<L>  /  TBili  0.4  /  DBili  x   /  AST  22  /  ALT  15  /  AlkPhos  59  11-17      Phosphorus Level, Serum: 3.8 mg/dL (11-17 @ 02:45)  Magnesium, Serum: 1.6 mg/dL (11-17 @ 02:45) HPI:  Patient is a 52 y/o F with PMH DM and recently diagnosed stage IIIB cervical cancer who presents with vaginal bleeding with clots.    Patient first started experiencing vaginal bleeding in September of 2018. Patient went to [facility] and states she had USG abdomen which showed mass, after which she was referred to Gynecologist who noted a friable cervix on examination s/p biopsy, who referred her to Dr. Carter for further evaluation. Pathology of "tissue from cervix" dated 10/12/18 showed squamous cell carcinoma. On exam by Dr. Carter on 10/18/18, there was a 6 cm firm and friable cervix with minute cervical rim at vaginal margin with possible vaginal involvement at 5:00 and L USL replaced by tumor extending to the side wall/ R USL firm, fixed and smooth, consistent with at least clinical stage IIIB disease. Dr. Carter referred patient to rad/onc for management. Patient was evaluated by Dr. Mcmahan and referred her to medical oncology. She had PET scan done on 1/7/18 with no evidence of metastatic disease. Plan was to start concurrent chemoradiation with Cisplatin.    Today, patient reports lower abdominal pain and cramping. Vaginal bleeding has improved now that she is s/p embolization. Denies vomiting, diarrhea, fever, chills       PMHx: DM III  Meds: metformin, iron, reglan, colace  All: amoxicillin  PSHx: cholecystectomy  Social: denies toxic habits (17 Nov 2018 00:08)      Allergies    amoxicillin (Unknown)    Intolerances        MEDICATIONS  (STANDING):  insulin lispro (HumaLOG) corrective regimen sliding scale   SubCutaneous every 6 hours  lactated ringers. 1000 milliLiter(s) (125 mL/Hr) IV Continuous <Continuous>    MEDICATIONS  (PRN):      PAST MEDICAL & SURGICAL HISTORY:  DM (diabetes mellitus)  Cervical cancer      FAMILY HISTORY:      SOCIAL HISTORY: No EtOH, no tobacco    REVIEW OF SYSTEMS: per HPI    Height (cm): 152.4 (11-17 @ 02:45)  Weight (kg): 64.5 (11-17 @ 02:45)  BMI (kg/m2): 27.8 (11-17 @ 02:45)  BSA (m2): 1.61 (11-17 @ 02:45)    T(F): 98.8 (11-17-18 @ 08:00), Max: 99.6 (11-16-18 @ 19:46)  HR: 70 (11-17-18 @ 08:00)  BP: 100/50 (11-17-18 @ 08:00)  RR: 15 (11-17-18 @ 08:00)  SpO2: 97% (11-17-18 @ 08:00)  Wt(kg): --    GENERAL: NAD, well-developed  HEAD:  Atraumatic, Normocephalic  EYES: EOMI,  conjunctiva and sclera clear  NECK: Supple  CHEST/LUNG: Clear to auscultation bilaterally; No wheeze  HEART: Regular rate and rhythm; No murmurs, rubs, or gallops  ABDOMEN: Soft, tender to palpation in lower quadrants   EXTREMITIES:  No clubbing, cyanosis, or edema  NEUROLOGY: non-focal  SKIN: No rashes or lesions                          9.8    10.20 )-----------( 181      ( 17 Nov 2018 06:20 )             28.9       11-17    138  |  105  |  5<L>  ----------------------------<  113<H>  3.8   |  23  |  0.54    Ca    7.5<L>      17 Nov 2018 02:45  Phos  3.8     11-17  Mg     1.6     11-17    TPro  5.3<L>  /  Alb  2.9<L>  /  TBili  0.4  /  DBili  x   /  AST  22  /  ALT  15  /  AlkPhos  59  11-17      Phosphorus Level, Serum: 3.8 mg/dL (11-17 @ 02:45)  Magnesium, Serum: 1.6 mg/dL (11-17 @ 02:45)

## 2018-11-17 NOTE — PROGRESS NOTE ADULT - PROBLEM SELECTOR PLAN 1
Neuro: continue IV pain medications PRN.   CV: Hemodynamically stable, f/u AM labs  Pulm: Saturating well on room air  GI: Continue NPO  : UOP adequate, continue hairston  Heme: Hold anticoagulation at this time until vaginal bleeding deemed to be minimal.   Endocrine: H/o DM2, ISS while inpatient  FEN: LR@125 while NPO, replete electrolytes PRN.  Dispo: Continue SICU care.    To be seen and d/w Dr. Farrell and GYN oncology team  WINSOME Hugo PGY2 -F/u radiation oncology and heme/onc recommendations  Neuro: continue IV pain medications PRN.   CV: Hemodynamically stable, f/u AM labs  Pulm: Saturating well on room air  GI: Continue NPO  : UOP adequate, continue hairston  Heme: Hold anticoagulation at this time until vaginal bleeding deemed to be minimal.   Endocrine: H/o DM2, ISS while inpatient  FEN: LR@125 while NPO, replete electrolytes PRN.  Dispo: Continue SICU care.    To be seen and d/w Dr. Farrell and GYN oncology team  WINSOME Hugo PGY2

## 2018-11-17 NOTE — CHART NOTE - NSCHARTNOTEFT_GEN_A_CORE
52yo F w locally advanced cervical cancer, planned for definitive chemoRT outpt with Dr Mcmahan. Patient admitted for vaginal bleeding. Had hgb ~6. Transfused 3 units, hgb now stable ~9. Bleeding now scant. Plan is for hopeful discharge by Monday. If that is the case, will continue with plan for outpatient definitive chemoRT. If patient worsens, please re-contact rad onc for consideration of inpatient radiation.

## 2018-11-17 NOTE — PROGRESS NOTE ADULT - SUBJECTIVE AND OBJECTIVE BOX
53y w PMH of Stage IIIB cervical cancer p/w vaginal bleeding s/p b/l UAE (11/17), HD#1    INTERVAL HPI/OVERNIGHT EVENTS: Pt seen and examined at bedside in SICU.  Pt w no acute complaints, denies fevers, chills, nausea, vomiting, lightheadedness, dizziness.  Family at bedside.    Per SICU,  had fallen out when pt came to SICU.  No active bleeding noted, pad was placed under patient.  Graham catheter in situ.    MEDICATIONS  (STANDING):  insulin lispro (HumaLOG) corrective regimen sliding scale   SubCutaneous every 6 hours  lactated ringers. 1000 milliLiter(s) (125 mL/Hr) IV Continuous <Continuous>  magnesium sulfate  IVPB 2 Gram(s) IV Intermittent once    MEDICATIONS  (PRN):    Allergies    amoxicillin (Unknown)    12 point ROS negative except as outlined above    Vital Signs Last 24 Hrs  T(C): 36.8 (17 Nov 2018 02:45), Max: 37.6 (16 Nov 2018 19:46)  T(F): 98.3 (17 Nov 2018 02:45), Max: 99.6 (16 Nov 2018 19:46)  HR: 66 (17 Nov 2018 02:45) (66 - 113)  BP: 122/58 (17 Nov 2018 02:45) (103/54 - 138/55)  BP(mean): 74 (17 Nov 2018 02:45) (74 - 74)  RR: 17 (17 Nov 2018 02:45) (14 - 20)  SpO2: 99% (17 Nov 2018 02:45) (87% - 100%)      PHYSICAL EXAM:    GA: NAD, A+0 x 3  CV: RRR  Pulm: CTA BL  Abd: +BS, soft, nontender, nondistended, no rebound or guarding,   Incision: R inguinal area w dressing, clean and dry  : no active bleeding noted externally, dried clot at vaginal opening, not removed  Graham: in situ, draining clear urine  Extremities: no swelling or calf tenderness    LABS:                        10.3   12.41 )-----------( 179      ( 17 Nov 2018 02:45 )             31.2     11-17    138  |  105  |  5<L>  ----------------------------<  113<H>  3.8   |  23  |  0.54    Ca    7.5<L>      17 Nov 2018 02:45  Phos  3.8     11-17  Mg     1.6     11-17    TPro  5.3<L>  /  Alb  2.9<L>  /  TBili  0.4  /  DBili  x   /  AST  22  /  ALT  15  /  AlkPhos  59  11-17    PT/INR - ( 16 Nov 2018 21:15 )   PT: 12.6 SEC;   INR: 1.13          PTT - ( 16 Nov 2018 21:15 )  PTT:24.7 SEC      RADIOLOGY & ADDITIONAL TESTS: 53y w PMH of Stage IIIB cervical cancer p/w vaginal bleeding s/p b/l UAE (11/17), HD#1    INTERVAL HPI/OVERNIGHT EVENTS: Pt seen and examined at bedside in SICU.  Pt w no acute complaints, denies fevers, chills, nausea, vomiting, lightheadedness, dizziness.  Family at bedside.    Per SICU,  had fallen out when pt came to SICU.  No active bleeding noted, pad was placed under patient.  Graham catheter in situ.  Pt received 1u pRBC in ED, and 2u during IR procedure.    MEDICATIONS  (STANDING):  insulin lispro (HumaLOG) corrective regimen sliding scale   SubCutaneous every 6 hours  lactated ringers. 1000 milliLiter(s) (125 mL/Hr) IV Continuous <Continuous>  magnesium sulfate  IVPB 2 Gram(s) IV Intermittent once    MEDICATIONS  (PRN):    Allergies    amoxicillin (Unknown)    12 point ROS negative except as outlined above    Vital Signs Last 24 Hrs  T(C): 36.8 (17 Nov 2018 02:45), Max: 37.6 (16 Nov 2018 19:46)  T(F): 98.3 (17 Nov 2018 02:45), Max: 99.6 (16 Nov 2018 19:46)  HR: 66 (17 Nov 2018 02:45) (66 - 113)  BP: 122/58 (17 Nov 2018 02:45) (103/54 - 138/55)  BP(mean): 74 (17 Nov 2018 02:45) (74 - 74)  RR: 17 (17 Nov 2018 02:45) (14 - 20)  SpO2: 99% (17 Nov 2018 02:45) (87% - 100%)      PHYSICAL EXAM:    GA: NAD, A+0 x 3  CV: RRR  Pulm: CTA BL  Abd: +BS, soft, nontender, nondistended, no rebound or guarding,   Incision: R inguinal area w dressing, clean and dry  : no active bleeding noted externally, dried clot at vaginal opening, not removed  Graham: in situ, draining clear urine  Extremities: no swelling or calf tenderness    LABS:                        10.3   12.41 )-----------( 179      ( 17 Nov 2018 02:45 )             31.2     11-17    138  |  105  |  5<L>  ----------------------------<  113<H>  3.8   |  23  |  0.54    Ca    7.5<L>      17 Nov 2018 02:45  Phos  3.8     11-17  Mg     1.6     11-17    TPro  5.3<L>  /  Alb  2.9<L>  /  TBili  0.4  /  DBili  x   /  AST  22  /  ALT  15  /  AlkPhos  59  11-17    PT/INR - ( 16 Nov 2018 21:15 )   PT: 12.6 SEC;   INR: 1.13          PTT - ( 16 Nov 2018 21:15 )  PTT:24.7 SEC      RADIOLOGY & ADDITIONAL TESTS:

## 2018-11-17 NOTE — PROGRESS NOTE ADULT - ASSESSMENT
54 yo with a h/o of Stage IIIB cervical carcinoma and DM2 a/w heavy vaginal bleeding and anemia, POD#0 s/p emergent b/l UAE (11/17). Pt is s/p 3u pRBC with appropriate rise in Hct (19.8->3upRBC->31.2) and is admitted to SICU for further monitoring. Pt c/o of b/l lower abdominal cramping which is likely 2/2 effects of UAE. Currently denies symptoms of anemia. Vital signs remain stable.

## 2018-11-17 NOTE — CONSULT NOTE ADULT - SUBJECTIVE AND OBJECTIVE BOX
SICU Consultation Note  =====================================================    Procedure: s/p bilateral uterine artery emobolization    Blood Products: 2 pRBC	  Complications: none     HPI:  Patient is a 54 y/o F with PMH DM and recently diagnosed stage IIIB cervical cancer. Patient states that she has otherwise been well until yesterday when she started having heavy vaginal bleeding with clots soaking through several pads an hour. Today the bleeding continued and she began to experience lightheadedness, weakness and dizziness at which point her sons bought her to [facility] for evaluation.     Patient with vaginal bleeding in september of 2018, evaluated by GYN in [place] found to have cervical mass showing squamous cell carcinoma on biopsy. Patient referred to [facility] for continued evaluation and treatment. Patient underwent PET scan in preparation for Radiation therapy showing locally advanced disease with no metastasis and was planning to begin radiation therapy in two weeks.    Gyn attempted to stop bleeding with vaginal packing, but patient continued to bleed through multiple pads. IR was consulted for embolization.     PMHx: DM III  Meds: metformin, iron, reglan, colace  All: amoxicillin  PSHx: cholecystectomy  Social: denies toxic habits (17 Nov 2018 00:08)    Allergies: amoxicillin (Unknown)    PAST MEDICAL & SURGICAL HISTORY:  DM (diabetes mellitus)  Cervical cancer      ADVANCE DIRECTIVES: Presumed Full Code     REVIEW OF SYSTEMS:   General: Non-Contributory  Skin/Breast: Non-Contributory  Ophthalmologic: Non-Contributory  ENMT: Non-Contributory  Respiratory and Thorax: Non-Contributory  Cardiovascular: Non-Contributory  Gastrointestinal: Non-Contributory  Genitourinary: Non-Contributory  Musculoskeletal: Non-Contributory  Neurological: Non-Contributory  Psychiatric: Non-Contributory  Hematology/Lymphatics: Non-Contributory  Endocrine: Non-Contributory  Allergic/Immunologic: Non-Contributory    HOME MEDICATIONS:   Metformin    CURRENT MEDICATIONS:   --------------------------------------------------------------------------------------  Neurologic Medications    Respiratory Medications    Cardiovascular Medications    Gastrointestinal Medications  lactated ringers. 1000 milliLiter(s) IV Continuous <Continuous>    Genitourinary Medications    Hematologic/Oncologic Medications    Antimicrobial/Immunologic Medications    Endocrine/Metabolic Medications    Topical/Other Medications    --------------------------------------------------------------------------------------    VITAL SIGNS, INS/OUTS (last 24 hours):  --------------------------------------------------------------------------------------  T(C): 36.8 (11-17-18 @ 02:45), Max: 37.6 (11-16-18 @ 19:46)  HR: 66 (11-17-18 @ 02:45) (66 - 113)  BP: 122/58 (11-17-18 @ 02:45) (103/54 - 138/55)  ABP: --  ABP(mean): --  RR: 17 (11-17-18 @ 02:45) (14 - 20)  SpO2: 99% (11-17-18 @ 02:45) (87% - 100%)  Wt(kg): --  CVP(mm Hg): --      11-16 @ 07:01  -  11-17 @ 04:00  --------------------------------------------------------  IN:  Total IN: 0 mL    OUT:    Indwelling Catheter - Urethral: 250 mL  Total OUT: 250 mL    Total NET: -250 mL        --------------------------------------------------------------------------------------    EXAM  NEUROLOGY  RASS:   	GCS:    Exam: Normal, NAD, alert, oriented x 3, no focal deficits. *Maltese speak, son at bedside    HEENT  Exam: Normocephalic, atraumatic.  EOMI     RESPIRATORY  Exam: Lungs clear to auscultation, Normal expansion/effort.        CARDIOVASCULAR  Exam: S1, S2.  Regular rate and rhythm.     GI/NUTRITION  Exam: Abdomen soft, Non-tender, Non-distended. mildly tender.  Current Diet:  NPO    VASCULAR  Exam: Extremities warm, pink, well-perfused.     MUSCULOSKELETAL  Exam: All extremities moving spontaneously without limitations.     SKIN:  Exam: Good skin turgor, no skin breakdown.    METABOLIC/FLUIDS/ELECTROLYTES  lactated ringers. 1000 milliLiter(s) IV Continuous <Continuous>      HEMATOLOGIC  [x] DVT Prophylaxis: holding chemical VTE prophylaxis  Transfusions:	[] PRBC	[] Platelets		[] FFP	[] Cryoprecipitate        Tubes/Lines/Drains  [x] Peripheral IV  [] Central Venous Line     	[] R	[] L	[] IJ	[] Fem	[] SC	Date Placed:   [] Arterial Line		[] R	[] L	[] Fem	[] Rad	[] Ax	Date Placed:   [] PICC:         	[] Midline		[] Mediport  [x] Urinary Catheter		Date Placed: 11/17/18    LABS  --------------------------------------------------------------------------------------  CBC (11-17 @ 02:45)                              10.3<L>                         12.41<H>  )----------------(  179        --    % Neutrophils, --    % Lymphocytes, ANC: --                                  31.2<L>              CBC (11-16 @ 21:15)                              6.5<LL>                         7.65    )----------------(  231        69.2  % Neutrophils, 17.6  % Lymphocytes, ANC: 5.29                                19.8<LL>                BMP (11-17 @ 02:45)             138     |  105     |  5<L>  		Ca++ --      Ca 7.5<L>             ---------------------------------( 113<H>		Mg 1.6                3.8     |  23      |  0.54  			Ph 3.8     BMP (11-16 @ 21:15)             136     |  103     |  6<L>  		Ca++ --      Ca 8.5                ---------------------------------( 131<H>		Mg --                 3.7     |  23      |  0.52  			Ph --        LFTs (11-17 @ 02:45)      TPro 5.3<L> / Alb 2.9<L> / TBili 0.4 / DBili -- / AST 22 / ALT 15 / AlkPhos 59  LFTs (11-16 @ 21:15)      TPro 6.8 / Alb 3.6 / TBili < 0.2<L> / DBili -- / AST 24 / ALT 19 / AlkPhos 69    Coags (11-16 @ 21:15)  aPTT 24.7<L> / INR 1.13 / PT 12.6          --------------------------------------------------------------------------------------    OTHER LABS    IMAGING RESULTS  Echo:   CT:   Xray:

## 2018-11-18 LAB
ALBUMIN SERPL ELPH-MCNC: 3 G/DL — LOW (ref 3.3–5)
ALP SERPL-CCNC: 66 U/L — SIGNIFICANT CHANGE UP (ref 40–120)
ALT FLD-CCNC: 27 U/L — SIGNIFICANT CHANGE UP (ref 4–33)
AST SERPL-CCNC: 40 U/L — HIGH (ref 4–32)
BASOPHILS # BLD AUTO: 0.03 K/UL — SIGNIFICANT CHANGE UP (ref 0–0.2)
BASOPHILS NFR BLD AUTO: 0.4 % — SIGNIFICANT CHANGE UP (ref 0–2)
BILIRUB SERPL-MCNC: 0.3 MG/DL — SIGNIFICANT CHANGE UP (ref 0.2–1.2)
BUN SERPL-MCNC: 4 MG/DL — LOW (ref 7–23)
CALCIUM SERPL-MCNC: 8.2 MG/DL — LOW (ref 8.4–10.5)
CHLORIDE SERPL-SCNC: 105 MMOL/L — SIGNIFICANT CHANGE UP (ref 98–107)
CO2 SERPL-SCNC: 25 MMOL/L — SIGNIFICANT CHANGE UP (ref 22–31)
CREAT SERPL-MCNC: 0.5 MG/DL — SIGNIFICANT CHANGE UP (ref 0.5–1.3)
EOSINOPHIL # BLD AUTO: 0.02 K/UL — SIGNIFICANT CHANGE UP (ref 0–0.5)
EOSINOPHIL NFR BLD AUTO: 0.2 % — SIGNIFICANT CHANGE UP (ref 0–6)
GLUCOSE BLDC GLUCOMTR-MCNC: 103 MG/DL — HIGH (ref 70–99)
GLUCOSE BLDC GLUCOMTR-MCNC: 107 MG/DL — HIGH (ref 70–99)
GLUCOSE BLDC GLUCOMTR-MCNC: 125 MG/DL — HIGH (ref 70–99)
GLUCOSE BLDC GLUCOMTR-MCNC: 157 MG/DL — HIGH (ref 70–99)
GLUCOSE SERPL-MCNC: 110 MG/DL — HIGH (ref 70–99)
HCT VFR BLD CALC: 29.5 % — LOW (ref 34.5–45)
HGB BLD-MCNC: 9.8 G/DL — LOW (ref 11.5–15.5)
IMM GRANULOCYTES # BLD AUTO: 0.08 # — SIGNIFICANT CHANGE UP
IMM GRANULOCYTES NFR BLD AUTO: 0.9 % — SIGNIFICANT CHANGE UP (ref 0–1.5)
LYMPHOCYTES # BLD AUTO: 1.39 K/UL — SIGNIFICANT CHANGE UP (ref 1–3.3)
LYMPHOCYTES # BLD AUTO: 16.4 % — SIGNIFICANT CHANGE UP (ref 13–44)
MAGNESIUM SERPL-MCNC: 2 MG/DL — SIGNIFICANT CHANGE UP (ref 1.6–2.6)
MCHC RBC-ENTMCNC: 27.1 PG — SIGNIFICANT CHANGE UP (ref 27–34)
MCHC RBC-ENTMCNC: 33.2 % — SIGNIFICANT CHANGE UP (ref 32–36)
MCV RBC AUTO: 81.7 FL — SIGNIFICANT CHANGE UP (ref 80–100)
MONOCYTES # BLD AUTO: 1.01 K/UL — HIGH (ref 0–0.9)
MONOCYTES NFR BLD AUTO: 11.9 % — SIGNIFICANT CHANGE UP (ref 2–14)
NEUTROPHILS # BLD AUTO: 5.93 K/UL — SIGNIFICANT CHANGE UP (ref 1.8–7.4)
NEUTROPHILS NFR BLD AUTO: 70.2 % — SIGNIFICANT CHANGE UP (ref 43–77)
NRBC # FLD: 0.02 — SIGNIFICANT CHANGE UP
PHOSPHATE SERPL-MCNC: 2.7 MG/DL — SIGNIFICANT CHANGE UP (ref 2.5–4.5)
PLATELET # BLD AUTO: 174 K/UL — SIGNIFICANT CHANGE UP (ref 150–400)
PMV BLD: 9.7 FL — SIGNIFICANT CHANGE UP (ref 7–13)
POTASSIUM SERPL-MCNC: 3.7 MMOL/L — SIGNIFICANT CHANGE UP (ref 3.5–5.3)
POTASSIUM SERPL-SCNC: 3.7 MMOL/L — SIGNIFICANT CHANGE UP (ref 3.5–5.3)
PROT SERPL-MCNC: 6.2 G/DL — SIGNIFICANT CHANGE UP (ref 6–8.3)
RBC # BLD: 3.61 M/UL — LOW (ref 3.8–5.2)
RBC # FLD: 15.2 % — HIGH (ref 10.3–14.5)
SODIUM SERPL-SCNC: 140 MMOL/L — SIGNIFICANT CHANGE UP (ref 135–145)
WBC # BLD: 8.46 K/UL — SIGNIFICANT CHANGE UP (ref 3.8–10.5)
WBC # FLD AUTO: 8.46 K/UL — SIGNIFICANT CHANGE UP (ref 3.8–10.5)

## 2018-11-18 PROCEDURE — 99232 SBSQ HOSP IP/OBS MODERATE 35: CPT

## 2018-11-18 RX ORDER — IBUPROFEN 200 MG
600 TABLET ORAL EVERY 6 HOURS
Qty: 0 | Refills: 0 | Status: DISCONTINUED | OUTPATIENT
Start: 2018-11-18 | End: 2018-11-22

## 2018-11-18 RX ORDER — ACETAMINOPHEN 500 MG
650 TABLET ORAL EVERY 6 HOURS
Qty: 0 | Refills: 0 | Status: DISCONTINUED | OUTPATIENT
Start: 2018-11-18 | End: 2018-11-22

## 2018-11-18 RX ORDER — OXYCODONE HYDROCHLORIDE 5 MG/1
5 TABLET ORAL EVERY 6 HOURS
Qty: 0 | Refills: 0 | Status: DISCONTINUED | OUTPATIENT
Start: 2018-11-18 | End: 2018-11-22

## 2018-11-18 RX ADMIN — ENOXAPARIN SODIUM 40 MILLIGRAM(S): 100 INJECTION SUBCUTANEOUS at 12:54

## 2018-11-18 RX ADMIN — Medication 650 MILLIGRAM(S): at 13:36

## 2018-11-18 RX ADMIN — Medication 600 MILLIGRAM(S): at 21:10

## 2018-11-18 RX ADMIN — Medication 600 MILLIGRAM(S): at 08:46

## 2018-11-18 RX ADMIN — Medication 600 MILLIGRAM(S): at 20:43

## 2018-11-18 RX ADMIN — OXYCODONE HYDROCHLORIDE 5 MILLIGRAM(S): 5 TABLET ORAL at 17:00

## 2018-11-18 RX ADMIN — Medication 600 MILLIGRAM(S): at 09:45

## 2018-11-18 RX ADMIN — Medication 650 MILLIGRAM(S): at 12:54

## 2018-11-18 RX ADMIN — SODIUM CHLORIDE 125 MILLILITER(S): 9 INJECTION, SOLUTION INTRAVENOUS at 12:53

## 2018-11-18 RX ADMIN — OXYCODONE HYDROCHLORIDE 5 MILLIGRAM(S): 5 TABLET ORAL at 04:15

## 2018-11-18 RX ADMIN — OXYCODONE HYDROCHLORIDE 5 MILLIGRAM(S): 5 TABLET ORAL at 16:06

## 2018-11-18 RX ADMIN — OXYCODONE HYDROCHLORIDE 5 MILLIGRAM(S): 5 TABLET ORAL at 05:15

## 2018-11-18 NOTE — PROGRESS NOTE ADULT - ATTENDING COMMENTS
Seen on rounds with F. Reviewed Labs and flow. Min bleeding. Await Rad Onc eval for logistics of therapy. D/C Graham. Cont observation. Seen on rounds with F. Reviewed Labs and flow. Min bleeding. Groin dressing dry. Rena PO. Await Rad Onc eval for logistics of therapy. D/C Graham. Cont observation.

## 2018-11-18 NOTE — PROVIDER CONTACT NOTE (OTHER) - ASSESSMENT
Patient ambulated to bathroom, pad saturated with sang drainage, passed walnut-sized clot upon urination. Patient complained of dizziness and seeing "spots". VSS.

## 2018-11-18 NOTE — PROVIDER CONTACT NOTE (OTHER) - ASSESSMENT
A&Ox4, pt c/o feeling dizzy after pt returned from bathroom. Pt's urine continues to be with bright red blood per vagina. Pt reports mild pain to RLQ to abdomen.  BP 98/51, HR 86, RR - 16, O2sat - 98%on room air. A&Ox4, pt c/o feeling dizzy after pt returned from bathroom. Pt's urine continues to be with bright red blood per vagina. Pt reports mild -sever pain to RLQ to abdomen.  BP 98/51, HR 86, RR - 16, O2sat - 98%on room air. A&Ox4, pt c/o feeling dizzy after pt returned from bathroom. Pt's urine continues to be with bright red blood per vagina. Pt reports mild -sever pain to LLQ to abdomen.  BP 98/51, HR 86, RR - 16, O2sat - 98%on room air.

## 2018-11-18 NOTE — PROGRESS NOTE ADULT - SUBJECTIVE AND OBJECTIVE BOX
Patient transferred from SICU overnight. Had fever at 5PM yesterday of 100.5. Has been afebrile since that time. C/o crampy abdominal pain. Patient has not ambulated out of bed while on the floor. Tolerating regular diet. Graham in place. Pt denies fever, chills, chest pain, SOB, nausea, vomiting, lightheadedness, dizziness.      Objective:  T(F): 98.4 (11-18-18 @ 00:47), Max: 100.5 (11-17-18 @ 16:00)  HR: 84 (11-18-18 @ 00:47) (68 - 94)  BP: 109/55 (11-18-18 @ 00:47) (94/64 - 113/63)  RR: 20 (11-18-18 @ 00:47) (15 - 22)  SpO2: 98% (11-18-18 @ 00:47) (97% - 99%)  Wt(kg): --  I&O's Summary    16 Nov 2018 07:01  -  17 Nov 2018 07:00  --------------------------------------------------------  IN: 500 mL / OUT: 400 mL / NET: 100 mL    17 Nov 2018 07:01  -  18 Nov 2018 05:15  --------------------------------------------------------  IN: 1500 mL / OUT: 3251 mL / NET: -1751 mL      CAPILLARY BLOOD GLUCOSE      POCT Blood Glucose.: 113 mg/dL (17 Nov 2018 22:36)  POCT Blood Glucose.: 122 mg/dL (17 Nov 2018 17:12)  POCT Blood Glucose.: 110 mg/dL (17 Nov 2018 11:53)  POCT Blood Glucose.: 130 mg/dL (17 Nov 2018 06:25)      MEDICATIONS  (STANDING):  enoxaparin Injectable 40 milliGRAM(s) SubCutaneous daily  insulin lispro (HumaLOG) corrective regimen sliding scale   SubCutaneous three times a day before meals  insulin lispro (HumaLOG) corrective regimen sliding scale   SubCutaneous at bedtime  lactated ringers. 1000 milliLiter(s) (125 mL/Hr) IV Continuous <Continuous>    MEDICATIONS  (PRN):  acetaminophen   Tablet .. 650 milliGRAM(s) Oral every 6 hours PRN Mild Pain (1 - 3)  ibuprofen  Tablet. 600 milliGRAM(s) Oral every 6 hours PRN Moderate Pain (4 - 6)  oxyCODONE    IR 5 milliGRAM(s) Oral every 6 hours PRN Severe Pain (7 - 10)      Physical Exam:  Constitutional: NAD, A+O x3  CV: RRR  Lungs: clear to auscultation bilaterally  Abdomen: soft,[] bowel sounds, appropriately tender, softly distended, no rebound or guarding  Incision: clean, dry, intact  Extremities: no lower extremity edema or calf tenderness bilaterally, venodynes in place    Labs:                        9.6    9.32  )-----------( 172      ( 17 Nov 2018 17:15 )             28.7                         9.0    8.08  )-----------( 166      ( 17 Nov 2018 14:20 )             26.3     11-17    138    |  105    |  5<L>   ----------------------------<  113<H>  3.8     |  23     |  0.54   11-16    136    |  103    |  6<L>   ----------------------------<  131<H>  3.7     |  23     |  0.52     Ca    7.5<L>      17 Nov 2018 02:45  Ca    8.5        16 Nov 2018 21:15  Phos  3.8       11-17  Mg     1.6       11-17    TPro  5.3<L>  /  Alb  2.9<L>  /  TBili  0.4    /  DBili  x      /  AST  22     /  ALT  15     /  AlkPhos  59     11-17  TPro  6.8    /  Alb  3.6    /  TBili  < 0.2<L>  /  DBili  x      /  AST  24     /  ALT  19     /  AlkPhos  69     11-16        PT/INR - ( 17 Nov 2018 02:45 )   PT: 12.2 SEC;   INR: 1.07          PTT - ( 17 Nov 2018 02:45 )  PTT:22.9 SEC      Assessment/Plan: 53y s/p bilateral uterine artery embolization for vaginal bleeding, Stage 3B cervical carcinoma, HD 2     CV: Hemodynamically stable. F/u AM labs.  Pulm: Saturating well on RA.  GI: Continue regular diet as tolerated.   : Graham in place.   Heme: Continue Lovenox/Venodynes for DVT ppx. Increase OOB  Neuro: Continue oral meds for pain control  Dispo: Outpatient chemoRT w/ Dr. Mcmahan. Son present in the room and provided translation in [place].   Patient transferred from SICU overnight. Had fever at 5PM yesterday of 100.5. Has been afebrile since that time. C/o crampy abdominal pain. Patient has not ambulated out of bed while on the floor. Two pads changed overnight with moderate staining.  Tolerating regular diet. Graham in place. Pt denies fever, chills, chest pain, SOB, nausea, vomiting, lightheadedness, dizziness.      Objective:  T(F): 98.4 (11-18-18 @ 00:47), Max: 100.5 (11-17-18 @ 16:00)  HR: 84 (11-18-18 @ 00:47) (68 - 94)  BP: 109/55 (11-18-18 @ 00:47) (94/64 - 113/63)  RR: 20 (11-18-18 @ 00:47) (15 - 22)  SpO2: 98% (11-18-18 @ 00:47) (97% - 99%)  Wt(kg): --  I&O's Summary    16 Nov 2018 07:01  -  17 Nov 2018 07:00  --------------------------------------------------------  IN: 500 mL / OUT: 400 mL / NET: 100 mL    17 Nov 2018 07:01  -  18 Nov 2018 05:15  --------------------------------------------------------  IN: 1500 mL / OUT: 3251 mL / NET: -1751 mL      CAPILLARY BLOOD GLUCOSE      POCT Blood Glucose.: 113 mg/dL (17 Nov 2018 22:36)  POCT Blood Glucose.: 122 mg/dL (17 Nov 2018 17:12)  POCT Blood Glucose.: 110 mg/dL (17 Nov 2018 11:53)  POCT Blood Glucose.: 130 mg/dL (17 Nov 2018 06:25)      MEDICATIONS  (STANDING):  enoxaparin Injectable 40 milliGRAM(s) SubCutaneous daily  insulin lispro (HumaLOG) corrective regimen sliding scale   SubCutaneous three times a day before meals  insulin lispro (HumaLOG) corrective regimen sliding scale   SubCutaneous at bedtime  lactated ringers. 1000 milliLiter(s) (125 mL/Hr) IV Continuous <Continuous>    MEDICATIONS  (PRN):  acetaminophen   Tablet .. 650 milliGRAM(s) Oral every 6 hours PRN Mild Pain (1 - 3)  ibuprofen  Tablet. 600 milliGRAM(s) Oral every 6 hours PRN Moderate Pain (4 - 6)  oxyCODONE    IR 5 milliGRAM(s) Oral every 6 hours PRN Severe Pain (7 - 10)      Physical Exam:  Constitutional: NAD, A+O x3  CV: RRR  Lungs: clear to auscultation bilaterally  Abdomen: soft, +BS, midly tender, none distended, no rebound or guarding  Pad: new pad changed ~15-20 minutes ago with no staining  Extremities: no lower extremity edema or calf tenderness bilaterally, venodynes in place    Labs:                        9.8    8.46  )-----------( 174      ( 18 Nov 2018 06:10 )             29.5                9.6    9.32  )-----------( 172      ( 17 Nov 2018 17:15 )             28.7                         9.0    8.08  )-----------( 166      ( 17 Nov 2018 14:20 )             26.3     11-17    138    |  105    |  5<L>   ----------------------------<  113<H>  3.8     |  23     |  0.54   11-16    136    |  103    |  6<L>   ----------------------------<  131<H>  3.7     |  23     |  0.52     Ca    7.5<L>      17 Nov 2018 02:45  Ca    8.5        16 Nov 2018 21:15  Phos  3.8       11-17  Mg     1.6       11-17    TPro  5.3<L>  /  Alb  2.9<L>  /  TBili  0.4    /  DBili  x      /  AST  22     /  ALT  15     /  AlkPhos  59     11-17  TPro  6.8    /  Alb  3.6    /  TBili  < 0.2<L>  /  DBili  x      /  AST  24     /  ALT  19     /  AlkPhos  69     11-16        PT/INR - ( 17 Nov 2018 02:45 )   PT: 12.2 SEC;   INR: 1.07          PTT - ( 17 Nov 2018 02:45 )  PTT:22.9 SEC

## 2018-11-18 NOTE — PROGRESS NOTE ADULT - PROBLEM SELECTOR PLAN 1
CV: Hemodynamically stable. AM CBC stable.   Pulm: Saturating well on RA.  GI: Continue regular diet as tolerated.   : Hairston in place. Consider d/c hairston today.  Heme: Continue Lovenox/Venodynes for DVT ppx. Increase OOB  Neuro: Continue oral meds for pain control  Dispo: Outpatient chemoRT w/ Dr. Mcmahna.

## 2018-11-18 NOTE — PROGRESS NOTE ADULT - ASSESSMENT
Assessment/Plan: 53y s/p bilateral uterine artery embolization for vaginal bleeding, Stage 3B cervical carcinoma, HD 2

## 2018-11-18 NOTE — PROVIDER CONTACT NOTE (OTHER) - ASSESSMENT
A&Ox4. calm. Denies SOB/Chest pain. Pt continues to c/o RLQ abd pain that is managed with ordered pain medication. Pt reports feeling weak. VSS. /62. HR 85. Temp - 98.4. RR - 18, O2 sat 100% on room air. A&Ox4. calm. Denies SOB/Chest pain. Pt continues to c/o LLQ abd pain that is managed with ordered pain medication. Pt reports feeling weak. VSS. /62. HR 85. Temp - 98.4. RR - 18, O2 sat 100% on room air.

## 2018-11-19 ENCOUNTER — TRANSCRIPTION ENCOUNTER (OUTPATIENT)
Age: 53
End: 2018-11-19

## 2018-11-19 PROBLEM — E11.9 TYPE 2 DIABETES MELLITUS WITHOUT COMPLICATIONS: Chronic | Status: ACTIVE | Noted: 2018-11-16

## 2018-11-19 PROBLEM — C53.9 MALIGNANT NEOPLASM OF CERVIX UTERI, UNSPECIFIED: Chronic | Status: ACTIVE | Noted: 2018-11-16

## 2018-11-19 LAB
APPEARANCE UR: SIGNIFICANT CHANGE UP
BACTERIA # UR AUTO: SIGNIFICANT CHANGE UP
BASOPHILS # BLD AUTO: 0.03 K/UL — SIGNIFICANT CHANGE UP (ref 0–0.2)
BASOPHILS # BLD AUTO: 0.03 K/UL — SIGNIFICANT CHANGE UP (ref 0–0.2)
BASOPHILS NFR BLD AUTO: 0.3 % — SIGNIFICANT CHANGE UP (ref 0–2)
BASOPHILS NFR BLD AUTO: 0.3 % — SIGNIFICANT CHANGE UP (ref 0–2)
BILIRUB UR-MCNC: NEGATIVE — SIGNIFICANT CHANGE UP
BLOOD UR QL VISUAL: SIGNIFICANT CHANGE UP
COLOR SPEC: SIGNIFICANT CHANGE UP
EOSINOPHIL # BLD AUTO: 0.04 K/UL — SIGNIFICANT CHANGE UP (ref 0–0.5)
EOSINOPHIL # BLD AUTO: 0.07 K/UL — SIGNIFICANT CHANGE UP (ref 0–0.5)
EOSINOPHIL NFR BLD AUTO: 0.4 % — SIGNIFICANT CHANGE UP (ref 0–6)
EOSINOPHIL NFR BLD AUTO: 0.7 % — SIGNIFICANT CHANGE UP (ref 0–6)
GLUCOSE BLDC GLUCOMTR-MCNC: 118 MG/DL — HIGH (ref 70–99)
GLUCOSE BLDC GLUCOMTR-MCNC: 120 MG/DL — HIGH (ref 70–99)
GLUCOSE BLDC GLUCOMTR-MCNC: 121 MG/DL — HIGH (ref 70–99)
GLUCOSE BLDC GLUCOMTR-MCNC: 130 MG/DL — HIGH (ref 70–99)
GLUCOSE UR-MCNC: NEGATIVE — SIGNIFICANT CHANGE UP
HCT VFR BLD CALC: 23.8 % — LOW (ref 34.5–45)
HCT VFR BLD CALC: 27.8 % — LOW (ref 34.5–45)
HGB BLD-MCNC: 7.7 G/DL — LOW (ref 11.5–15.5)
HGB BLD-MCNC: 9.1 G/DL — LOW (ref 11.5–15.5)
IMM GRANULOCYTES # BLD AUTO: 0.07 # — SIGNIFICANT CHANGE UP
IMM GRANULOCYTES # BLD AUTO: 0.08 # — SIGNIFICANT CHANGE UP
IMM GRANULOCYTES NFR BLD AUTO: 0.7 % — SIGNIFICANT CHANGE UP (ref 0–1.5)
IMM GRANULOCYTES NFR BLD AUTO: 0.8 % — SIGNIFICANT CHANGE UP (ref 0–1.5)
KETONES UR-MCNC: NEGATIVE — SIGNIFICANT CHANGE UP
LEUKOCYTE ESTERASE UR-ACNC: HIGH
LYMPHOCYTES # BLD AUTO: 1.63 K/UL — SIGNIFICANT CHANGE UP (ref 1–3.3)
LYMPHOCYTES # BLD AUTO: 1.97 K/UL — SIGNIFICANT CHANGE UP (ref 1–3.3)
LYMPHOCYTES # BLD AUTO: 17.4 % — SIGNIFICANT CHANGE UP (ref 13–44)
LYMPHOCYTES # BLD AUTO: 20.6 % — SIGNIFICANT CHANGE UP (ref 13–44)
MCHC RBC-ENTMCNC: 27 PG — SIGNIFICANT CHANGE UP (ref 27–34)
MCHC RBC-ENTMCNC: 27.8 PG — SIGNIFICANT CHANGE UP (ref 27–34)
MCHC RBC-ENTMCNC: 32.4 % — SIGNIFICANT CHANGE UP (ref 32–36)
MCHC RBC-ENTMCNC: 32.7 % — SIGNIFICANT CHANGE UP (ref 32–36)
MCV RBC AUTO: 83.5 FL — SIGNIFICANT CHANGE UP (ref 80–100)
MCV RBC AUTO: 85 FL — SIGNIFICANT CHANGE UP (ref 80–100)
MONOCYTES # BLD AUTO: 1.23 K/UL — HIGH (ref 0–0.9)
MONOCYTES # BLD AUTO: 1.36 K/UL — HIGH (ref 0–0.9)
MONOCYTES NFR BLD AUTO: 13.1 % — SIGNIFICANT CHANGE UP (ref 2–14)
MONOCYTES NFR BLD AUTO: 14.2 % — HIGH (ref 2–14)
NEUTROPHILS # BLD AUTO: 6.07 K/UL — SIGNIFICANT CHANGE UP (ref 1.8–7.4)
NEUTROPHILS # BLD AUTO: 6.39 K/UL — SIGNIFICANT CHANGE UP (ref 1.8–7.4)
NEUTROPHILS NFR BLD AUTO: 63.4 % — SIGNIFICANT CHANGE UP (ref 43–77)
NEUTROPHILS NFR BLD AUTO: 68.1 % — SIGNIFICANT CHANGE UP (ref 43–77)
NITRITE UR-MCNC: NEGATIVE — SIGNIFICANT CHANGE UP
NRBC # FLD: 0 — SIGNIFICANT CHANGE UP
NRBC # FLD: 0.02 — SIGNIFICANT CHANGE UP
PH UR: 8 — SIGNIFICANT CHANGE UP (ref 5–8)
PLATELET # BLD AUTO: 197 K/UL — SIGNIFICANT CHANGE UP (ref 150–400)
PLATELET # BLD AUTO: 200 K/UL — SIGNIFICANT CHANGE UP (ref 150–400)
PMV BLD: 10.2 FL — SIGNIFICANT CHANGE UP (ref 7–13)
PMV BLD: 10.8 FL — SIGNIFICANT CHANGE UP (ref 7–13)
PROT UR-MCNC: 100 — HIGH
RBC # BLD: 2.85 M/UL — LOW (ref 3.8–5.2)
RBC # BLD: 3.27 M/UL — LOW (ref 3.8–5.2)
RBC # FLD: 15.1 % — HIGH (ref 10.3–14.5)
RBC # FLD: 15.5 % — HIGH (ref 10.3–14.5)
RBC CASTS # UR COMP ASSIST: SIGNIFICANT CHANGE UP (ref 0–?)
SP GR SPEC: 1.01 — SIGNIFICANT CHANGE UP (ref 1–1.04)
SQUAMOUS # UR AUTO: SIGNIFICANT CHANGE UP
UROBILINOGEN FLD QL: 0.2 — SIGNIFICANT CHANGE UP
WBC # BLD: 9.39 K/UL — SIGNIFICANT CHANGE UP (ref 3.8–10.5)
WBC # BLD: 9.58 K/UL — SIGNIFICANT CHANGE UP (ref 3.8–10.5)
WBC # FLD AUTO: 9.39 K/UL — SIGNIFICANT CHANGE UP (ref 3.8–10.5)
WBC # FLD AUTO: 9.58 K/UL — SIGNIFICANT CHANGE UP (ref 3.8–10.5)
WBC UR QL: >50 — HIGH (ref 0–?)

## 2018-11-19 RX ORDER — DIPHENHYDRAMINE HCL 50 MG
25 CAPSULE ORAL ONCE
Qty: 0 | Refills: 0 | Status: DISCONTINUED | OUTPATIENT
Start: 2018-11-19 | End: 2018-11-21

## 2018-11-19 RX ORDER — ACETAMINOPHEN 500 MG
650 TABLET ORAL ONCE
Qty: 0 | Refills: 0 | Status: COMPLETED | OUTPATIENT
Start: 2018-11-19 | End: 2018-11-19

## 2018-11-19 RX ORDER — IBUPROFEN 200 MG
1 TABLET ORAL
Qty: 0 | Refills: 0 | COMMUNITY
Start: 2018-11-19

## 2018-11-19 RX ORDER — DOCUSATE SODIUM 100 MG
100 CAPSULE ORAL
Qty: 0 | Refills: 0 | Status: DISCONTINUED | OUTPATIENT
Start: 2018-11-19 | End: 2018-11-22

## 2018-11-19 RX ORDER — ACETAMINOPHEN 500 MG
2 TABLET ORAL
Qty: 0 | Refills: 0 | COMMUNITY
Start: 2018-11-19

## 2018-11-19 RX ADMIN — OXYCODONE HYDROCHLORIDE 5 MILLIGRAM(S): 5 TABLET ORAL at 23:08

## 2018-11-19 RX ADMIN — Medication 650 MILLIGRAM(S): at 14:17

## 2018-11-19 RX ADMIN — Medication 650 MILLIGRAM(S): at 15:00

## 2018-11-19 RX ADMIN — OXYCODONE HYDROCHLORIDE 5 MILLIGRAM(S): 5 TABLET ORAL at 07:51

## 2018-11-19 RX ADMIN — Medication 100 MILLIGRAM(S): at 11:42

## 2018-11-19 RX ADMIN — Medication 600 MILLIGRAM(S): at 21:30

## 2018-11-19 RX ADMIN — ENOXAPARIN SODIUM 40 MILLIGRAM(S): 100 INJECTION SUBCUTANEOUS at 11:42

## 2018-11-19 RX ADMIN — Medication 600 MILLIGRAM(S): at 11:17

## 2018-11-19 RX ADMIN — Medication 100 MILLIGRAM(S): at 20:59

## 2018-11-19 RX ADMIN — OXYCODONE HYDROCHLORIDE 5 MILLIGRAM(S): 5 TABLET ORAL at 22:38

## 2018-11-19 RX ADMIN — Medication 600 MILLIGRAM(S): at 20:54

## 2018-11-19 RX ADMIN — OXYCODONE HYDROCHLORIDE 5 MILLIGRAM(S): 5 TABLET ORAL at 08:30

## 2018-11-19 RX ADMIN — Medication 600 MILLIGRAM(S): at 12:00

## 2018-11-19 NOTE — PROGRESS NOTE ADULT - ASSESSMENT
53 year old, HD#3 with Stage IIIB Cervical Carcinoma s/p bilateral uterine artery embolization for vaginal bleeding, s/p SICU stay (HD#1-2) currently hemodynamically stable with bleeding now improving. 53 year old, HD#3 with Stage IIIB Cervical Carcinoma s/p bilateral uterine artery embolization for vaginal bleeding, s/p SICU stay (HD#1-2) currently hemodynamically stable with bleeding now improved.

## 2018-11-19 NOTE — DISCHARGE NOTE ADULT - MEDICATION SUMMARY - MEDICATIONS TO TAKE
I will START or STAY ON the medications listed below when I get home from the hospital:    acetaminophen 325 mg oral tablet  -- 2 tab(s) by mouth every 6 hours, As needed, Mild Pain (1 - 3)  -- Indication: For pain    ibuprofen 600 mg oral tablet  -- 1 tab(s) by mouth every 6 hours, As needed, Moderate Pain (4 - 6)  -- Indication: For Pain I will START or STAY ON the medications listed below when I get home from the hospital:    acetaminophen 325 mg oral tablet  -- 2 tab(s) by mouth every 6 hours, As needed, Mild Pain (1 - 3)  -- Indication: For pain    ibuprofen 600 mg oral tablet  -- 1 tab(s) by mouth every 6 hours, As needed, Moderate Pain (4 - 6)  -- Indication: For Pain    docusate sodium 100 mg oral capsule  -- 1 cap(s) by mouth 2 times a day  -- Indication: For Constipation    senna oral tablet  -- 2 tab(s) by mouth once a day (at bedtime)  -- Indication: For Constipation I will START or STAY ON the medications listed below when I get home from the hospital:    acetaminophen 325 mg oral tablet  -- 2 tab(s) by mouth every 6 hours, As needed, Mild Pain (1 - 3)  -- Indication: For Mild pain management    ibuprofen 600 mg oral tablet  -- 1 tab(s) by mouth every 6 hours, As needed, Moderate Pain (4 - 6)  -- Indication: For Moderate pain management    oxyCODONE 5 mg oral tablet  -- 1 tab(s) by mouth every 6 hours, As needed, Severe Pain (7 - 10) MDD:4  -- Indication: For Moderate to severe pain management    senna oral tablet  -- 2 tab(s) by mouth once a day (at bedtime), As Needed for constipation  -- Indication: For Constipation    docusate sodium 100 mg oral capsule  -- 1 cap(s) by mouth 2 times a day, As Needed for stool softening  -- Indication: For stool softener

## 2018-11-19 NOTE — PROVIDER CONTACT NOTE (OTHER) - SITUATION
Patient ambulated to bathroom, pad saturated with sang drainage, passed walnut-sized clot upon urination.
Patient passed lemon-sized clot with urination.
Pt continues to have vaginal bleeding, during toileting Urine is bright red, Blood clots noted during this assessment.
Pt with some dizziness lab values showing hgb 7.7 and hct 23.8.
Pt c/o feeling dizzy after returning to bed after using bathroom.

## 2018-11-19 NOTE — DISCHARGE NOTE ADULT - PLAN OF CARE
Return to activities of daily living. Regular Diet  Activity as Rena Patient to follow up in clinic on . Patient to continue radiation and chemotherapy as directed for HemeOnc and RadOnc. Keep track of pad counts. Patient should return to ED if she experiences heavy vaginal bleeding (soaking thru 2 pads per hour for 2 consecutive hours). Call doctor if experiencing lightheadedness, dizziness, chest pain or palpitations. Take tylenol and motrin as needed for pain. Patient to follow up in clinic in 1 month, will call with appointment time. Patient to continue radiation and chemotherapy as directed for HemeOnc and RadOnc. Keep track of pad counts. Patient should return to ED if she experiences heavy vaginal bleeding (soaking thru 2 pads per hour for 2 consecutive hours). Call doctor if experiencing lightheadedness, dizziness, chest pain or palpitations. Take tylenol and motrin as needed for pain. Patient to follow up in clinic in 1 month, will call with appointment time. Patient to continue radiation and chemotherapy as directed for Heme Oncology and Rad Oncology. Keep track of pad counts. Patient should return to ED if she experiences heavy vaginal bleeding (soaking thru 2 pads per hour for 2 consecutive hours). Call doctor if experiencing lightheadedness, dizziness, chest pain or palpitations. Take Tylenol and Motrin as needed for pain.

## 2018-11-19 NOTE — PROVIDER CONTACT NOTE (OTHER) - ACTION/TREATMENT ORDERED:
PA made aware. No new orders at this time. RN to continue to monitor.
PA made aware. PA to assess patient. No new orders at this time. RN to continue to monitor.
Provider assessed, continue to monitor.
Will con't to monitor. Repeate CBC in AM. As per PA pt needs radiation therapy and will continue to bleed excessively due to her current diagnosis.
Will see patient and speak to attending.

## 2018-11-19 NOTE — DISCHARGE NOTE ADULT - CARE PLAN
Principal Discharge DX:	Cervical cancer  Goal:	Return to activities of daily living.  Assessment and plan of treatment:	Regular Diet  Activity as Rena Principal Discharge DX:	Cervical cancer  Goal:	Return to activities of daily living.  Assessment and plan of treatment:	Patient to follow up in clinic on . Patient to continue radiation and chemotherapy as directed for HemeOnc and RadOnc. Keep track of pad counts. Patient should return to ED if she experiences heavy vaginal bleeding (soaking thru 2 pads per hour for 2 consecutive hours). Call doctor if experiencing lightheadedness, dizziness, chest pain or palpitations. Take tylenol and motrin as needed for pain. Principal Discharge DX:	Cervical cancer  Goal:	Return to activities of daily living.  Assessment and plan of treatment:	Patient to follow up in clinic in 1 month, will call with appointment time. Patient to continue radiation and chemotherapy as directed for HemeOnc and RadOnc. Keep track of pad counts. Patient should return to ED if she experiences heavy vaginal bleeding (soaking thru 2 pads per hour for 2 consecutive hours). Call doctor if experiencing lightheadedness, dizziness, chest pain or palpitations. Take tylenol and motrin as needed for pain. Principal Discharge DX:	Cervical cancer  Goal:	Return to activities of daily living.  Assessment and plan of treatment:	Patient to follow up in clinic in 1 month, will call with appointment time. Patient to continue radiation and chemotherapy as directed for Heme Oncology and Rad Oncology. Keep track of pad counts. Patient should return to ED if she experiences heavy vaginal bleeding (soaking thru 2 pads per hour for 2 consecutive hours). Call doctor if experiencing lightheadedness, dizziness, chest pain or palpitations. Take Tylenol and Motrin as needed for pain.

## 2018-11-19 NOTE — DISCHARGE NOTE ADULT - CARE PROVIDER_API CALL
Satish Farrell (MD), Gynecologic Oncology; Obstetrics and Gynecology  [address]  [address] [place] [address]  Phone: [phone]  Fax: [phone]    Yvonne Mcmahan (MD; MPH), Radiation Oncology  [address] [facility]  [place] [address]  Phone: [phone]  Fax: [phone]    Fiordaliza Jalloh), HematologyOncology  [address] [facility]  [address] [place] [address]  Phone: [phone]  Fax:  [phone] Satish Farrell (MD), Gynecologic Oncology; Obstetrics and Gynecology  [address]  [address] [place] [address]  Phone: [phone]  Fax: [phone]    Yvonne Mcmahan (MD; MPH), Radiation Oncology  [address] [place] [address]  [place] [address]  Phone: [phone]  Fax: [phone]

## 2018-11-19 NOTE — DISCHARGE NOTE ADULT - HOSPITAL COURSE
Patient presented to the ED with heavy vaginal bleeding and symptomatic anemia. She was found to have Hct o 19.8m and was transfused 3 uPRBCs. Patient underwent an uncomplicated UAE for uncontrolled bleeding on HD#1. Procedure had no complications and bleeding was noted to be improved. HD#2 No acute events. Bleeding was improved and H/H kerri appropriately following transfusion. H/H trend noted below. HD#3 No acute events. Bleeding continued to be controlled and pain from procedure was controlled with oral analgesics.     Hct: 19.8->3 uPRBCs->31.2->28.9->27.3->26.3->28.7->29.5->27.8    Upon discharge on HD# 3, the patient is ambulating, voiding spontaneously, tolerating oral intake, pain was well controlled with oral medication, and vital signs were stable. Patient to have close follow up with Dr. Farrell. Patient presented to the ED with heavy vaginal bleeding and symptomatic anemia. She was found to have Hct o 19.8m and was transfused 3 uPRBCs. Patient underwent an uncomplicated UAE for uncontrolled bleeding on HD#1. Procedure had no complications and bleeding was noted to be improved. HD#2 No acute events. Bleeding was improved and H/H kerri appropriately following transfusion. H/H trend noted below. HD#3 No acute events. Bleeding continued to be controlled and pain from procedure was controlled with oral analgesics. HD#4 Patient had an episode of acute bleeding in the AM with EBL 600cc which correlated with a significant drop in H/H. At the time of the bleed, vaginal packing and hairston catheter was placed. She was transfused 2 uPRBCs and was started on radiation therapy. On HD#5, bleeding was noted to be improved and H/H was stable. Vaginal packing was removed. She was additionally started on chemotherapy per the [facility] team. Hairston catheter was discontinued and patient voided.     Hct: 19.8->3 uPRBCs->31.2->28.9->27.3->26.3->28.7->29.5->27.8->29.5->27.8->32.8->2uPRBCs->28.5->27.2->28.3->27.9    Upon discharge on HD# , the patient is ambulating, voiding spontaneously, tolerating oral intake, pain was well controlled with oral medication, and vital signs were stable. Patient to have close follow up with Dr. Farrell. Patient presented to the ED with heavy vaginal bleeding and symptomatic anemia. She was found to have Hct of 19.8m and was transfused 3 uPRBCs. Patient underwent an uncomplicated UAE for uncontrolled bleeding on HD#1. Procedure had no complications and bleeding was noted to be improved. HD#2 No acute events. Bleeding was improved and H/H kerri appropriately following transfusion. H/H trend noted below. HD#3 No acute events. Bleeding continued to be controlled and pain from procedure was controlled with oral analgesics. HD#4 Patient had an episode of acute bleeding in the AM with EBL 600cc which correlated with a significant drop in H/H. At the time of the bleed, vaginal packing and hairston catheter was placed. She was transfused 2 uPRBCs and was started on radiation therapy. On HD#5, bleeding was noted to be improved and H/H was stable. Vaginal packing was removed. She was additionally started on chemotherapy per the [facility] team. Hairston catheter was discontinued and patient voided.     Hct: 19.8->3 uPRBCs->31.2->28.9->27.3->26.3->28.7->29.5->27.8->29.5->27.8->32.8->2uPRBCs->28.5->27.2->28.3->27.9    Upon discharge on HD#6 , the patient is ambulating, voiding spontaneously, tolerating oral intake, pain was well controlled with oral medication, and vital signs were stable. Patient to have close follow up with Dr. Farrell. 54 yo female with h/o Stage IIIB Cervical Carcinoma, presented to the ED with heavy vaginal bleeding and symptomatic anemia. She was found to have Hct of 19.8 and was transfused 3uPRBCs.   Patient underwent an uncomplicated UAE (11/17) for uncontrolled bleeding on HD#1. Procedure was without complications and bleeding was noted to be improved.   HD#2 pt had no acute events. Bleeding was controlled and pt blood levels (H/H) kerri appropriately following transfusion (trend noted below).   HD#3 pt had acute events. Bleeding continued to be controlled and pain from procedure was controlled as well with oral analgesics.   HD#4 Patient had an episode of acute bleeding in the morning with EBL 600cc,which correlated with a significant drop in H/H. At the time of the bleed, vaginal packing placed (VPx1 11/20-11/21)    and Graham catheter was placed as well.  Pt was transfused an additional 2uPRBCs and Radiation therapy was initiated.    On HD#5, bleeding was noted to be improved and H/H remained stable. Vaginal packing was removed. Pt was additionally started on Chemotherapy 11/21 (cisplatin) per the Heme Onc team. Graham catheter was discontinued and patient voided spontaneously and without dysuria.      Labs:  WBC: 7.65->12.41->10.2-->9.32->8.46->9.6 ->11.3->10.7->9.1->9.69  Hct: 19.8 ->3upRBC->31.2->28.9->(27.3->27.8)->23.8->2uPRBCs-> 28.5 ->27.2->28.3->27.9->28.5               Cr: 0.52->0.54->0.5->0.57->0.42 ->0.40      Upon discharge on HD#6, the patient is ambulating, voiding spontaneously, tolerating oral intake, pain was well controlled with oral medication, and vital signs were stable. Patient to have close follow up with Dr. Farrell. Pt has appointment on 11/23 for Radiation Therapy at [facility] at 1030.  Instructions given to pt's son.

## 2018-11-19 NOTE — CHART NOTE - NSCHARTNOTEFT_GEN_A_CORE
PA NOTE    Pt evaluated sitting in chair. Denies c/o dizziness at this time and pain under good control. Pre ambulation VS: BP-105/50, HR-99, O2 Sat on RA-99%.  Ambulated pt in the patel. C/O mild dizziness and L groin pain where IR UAE was done. Post ambulation VS: /44, HR-98, O2 Sat on RA-100%.

## 2018-11-19 NOTE — DISCHARGE NOTE ADULT - NS AS ACTIVITY OBS
Showering allowed/No Heavy lifting/straining Showering allowed/Walking-Outdoors allowed/Walking-Indoors allowed/No Heavy lifting/straining/no driving when on narcotic pain medicine/Do not make important decisions/Do not drive or operate machinery

## 2018-11-19 NOTE — PROVIDER CONTACT NOTE (OTHER) - RECOMMENDATIONS
PA to be made aware. PA to assess patient and assess clot.
PA to assess patient. RN to continue to monitor.
Provider come assess?
Repeat CBC to assess H/H.
Draw CBC to assess H/H

## 2018-11-19 NOTE — DISCHARGE NOTE ADULT - INSTRUCTIONS
Regular Diet as Rena  Activity as Rena No dietary restrictions, activities as tolerated, Eat well balanced diet ,drink 8-10 glasses of water each day .IF temp above 100.4,heavy vaginal bleeding   notify MD

## 2018-11-19 NOTE — DISCHARGE NOTE ADULT - CARE PROVIDERS DIRECT ADDRESSES
[email or web address] [email or web address]

## 2018-11-19 NOTE — CHART NOTE - NSCHARTNOTEFT_GEN_A_CORE
Patient evaluated at 8:30pm last evening resting comfortably in bed. Notified by nurse that patient had passed a walnut size clot and in pain after ambulating to bathroom. Pt states that she was in pain after walking to bathroom. VSS. Patient received po Motrin; pain  subsided. Dr. Amado updated. Labs ordered for am. Patient re evaluated at 5am this morning. No further clots passed upon voiding overnight, pads have been dry.

## 2018-11-19 NOTE — PROGRESS NOTE ADULT - SUBJECTIVE AND OBJECTIVE BOX
HD#3    Translation performed by patients son:    Patient seen and examined at bedside. Patient passed a walnut sized clot overnight. Changed 1 pad overnight. Reports bleeding has slowed down. She is complaining of pain upon urination. Pain well controlled on current regimen.   She denies dizziness, lightheadedness, SOB/CP/palpitations, fever/chills, nausea/emesis. Tolerating regular diet.  +OOB,  +Flatus, +Voiding spontaneously    MEDICATIONS  (STANDING):  enoxaparin Injectable 40 milliGRAM(s) SubCutaneous daily  insulin lispro (HumaLOG) corrective regimen sliding scale   SubCutaneous three times a day before meals  insulin lispro (HumaLOG) corrective regimen sliding scale   SubCutaneous at bedtime    MEDICATIONS  (PRN):  acetaminophen   Tablet .. 650 milliGRAM(s) Oral every 6 hours PRN Mild Pain (1 - 3)  ibuprofen  Tablet. 600 milliGRAM(s) Oral every 6 hours PRN Moderate Pain (4 - 6)  oxyCODONE    IR 5 milliGRAM(s) Oral every 6 hours PRN Severe Pain (7 - 10)        Vital Signs Last 24 Hrs  T(C): 37.2 (19 Nov 2018 05:14), Max: 37.2 (19 Nov 2018 05:14)  T(F): 99 (19 Nov 2018 05:14), Max: 99 (19 Nov 2018 05:14)  HR: 89 (19 Nov 2018 05:14) (85 - 98)  BP: 114/57 (19 Nov 2018 05:14) (98/51 - 123/57)  BP(mean): --  RR: 16 (19 Nov 2018 05:14) (16 - 19)  SpO2: 100% (19 Nov 2018 05:14) (96% - 100%)    11-17 @ 07:01  -  11-18 @ 07:00  --------------------------------------------------------  IN: 1500 mL / OUT: 3551 mL / NET: -2051 mL    11-18 @ 07:01 - 11-19 @ 06:29  --------------------------------------------------------  IN: 860 mL / OUT: 4775 mL / NET: -3915 mL        PHYSICAL EXAM:    Gen: NAD, A+0 x 3  CV: RRR  Pulm: CTA BL  Abd: Soft, appropriately tender, non distended, no rebound or guarding, +BS, Groin sites with palpable pulses bilaterally.  : Pad wet with dry blood, no active bleeding  Extremities: No swelling or calf tenderness, SCDs in place    Labs, additional tests:             9.1    9.58  )-----------( 200      ( 11-19 @ 05:00 )             27.8                9.8    8.46  )-----------( 174      ( 11-18 @ 06:10 )             29.5                9.6    9.32  )-----------( 172      ( 11-17 @ 17:15 )             28.7                9.0    8.08  )-----------( 166      ( 11-17 @ 14:20 )             26.3                9.4    8.30  )-----------( 160      ( 11-17 @ 10:17 )             27.3                9.8    10.20 )-----------( 181      ( 11-17 @ 06:20 )             28.9                10.3   12.41 )-----------( 179      ( 11-17 @ 02:45 )             31.2                6.5    7.65  )-----------( 231      ( 11-16 @ 21:15 )             19.8       11-18    140  |  105  |  4<L>  ----------------------------<  110<H>  3.7   |  25  |  0.50    Ca    8.2<L>      18 Nov 2018 06:00  Phos  2.7     11-18  Mg     2.0     11-18    TPro  6.2  /  Alb  3.0<L>  /  TBili  0.3  /  DBili  x   /  AST  40<H>  /  ALT  27  /  AlkPhos  66  11-18 HD#3    Translation performed by patients son:    Patient seen and examined at bedside. Patient passed a walnut sized clot overnight. Changed 1 pad overnight. Reports bleeding has slowed down. She is complaining of pain upon urination, not burning but more of a general pelvic pain with urination. Pain well controlled on current regimen.   She denies dizziness, lightheadedness, SOB/CP/palpitations, fever/chills, nausea/emesis. Tolerating regular diet.  +OOB,  +Flatus, +Voiding spontaneously    MEDICATIONS  (STANDING):  enoxaparin Injectable 40 milliGRAM(s) SubCutaneous daily  insulin lispro (HumaLOG) corrective regimen sliding scale   SubCutaneous three times a day before meals  insulin lispro (HumaLOG) corrective regimen sliding scale   SubCutaneous at bedtime    MEDICATIONS  (PRN):  acetaminophen   Tablet .. 650 milliGRAM(s) Oral every 6 hours PRN Mild Pain (1 - 3)  ibuprofen  Tablet. 600 milliGRAM(s) Oral every 6 hours PRN Moderate Pain (4 - 6)  oxyCODONE    IR 5 milliGRAM(s) Oral every 6 hours PRN Severe Pain (7 - 10)        Vital Signs Last 24 Hrs  T(C): 37.2 (19 Nov 2018 05:14), Max: 37.2 (19 Nov 2018 05:14)  T(F): 99 (19 Nov 2018 05:14), Max: 99 (19 Nov 2018 05:14)  HR: 89 (19 Nov 2018 05:14) (85 - 98)  BP: 114/57 (19 Nov 2018 05:14) (98/51 - 123/57)  BP(mean): --  RR: 16 (19 Nov 2018 05:14) (16 - 19)  SpO2: 100% (19 Nov 2018 05:14) (96% - 100%)    11-17 @ 07:01  -  11-18 @ 07:00  --------------------------------------------------------  IN: 1500 mL / OUT: 3551 mL / NET: -2051 mL    11-18 @ 07:01  -  11-19 @ 06:29  --------------------------------------------------------  IN: 860 mL / OUT: 4775 mL / NET: -3915 mL        PHYSICAL EXAM:    Gen: NAD, A+0 x 3  CV: RRR  Pulm: CTA BL  Abd: Soft, appropriately tender, non distended, no rebound or guarding, +BS, Groin sites with palpable pulses bilaterally.  : Pad with scant dried blood, no active bleeding  Extremities: No swelling or calf tenderness, SCDs in place    Labs, additional tests:             9.1    9.58  )-----------( 200      ( 11-19 @ 05:00 )             27.8                9.8    8.46  )-----------( 174      ( 11-18 @ 06:10 )             29.5                9.6    9.32  )-----------( 172      ( 11-17 @ 17:15 )             28.7                9.0    8.08  )-----------( 166      ( 11-17 @ 14:20 )             26.3                9.4    8.30  )-----------( 160      ( 11-17 @ 10:17 )             27.3                9.8    10.20 )-----------( 181      ( 11-17 @ 06:20 )             28.9                10.3   12.41 )-----------( 179      ( 11-17 @ 02:45 )             31.2                6.5    7.65  )-----------( 231      ( 11-16 @ 21:15 )             19.8       11-18    140  |  105  |  4<L>  ----------------------------<  110<H>  3.7   |  25  |  0.50    Ca    8.2<L>      18 Nov 2018 06:00  Phos  2.7     11-18  Mg     2.0     11-18    TPro  6.2  /  Alb  3.0<L>  /  TBili  0.3  /  DBili  x   /  AST  40<H>  /  ALT  27  /  AlkPhos  66  11-18

## 2018-11-19 NOTE — DISCHARGE NOTE ADULT - PROVIDER TOKENS
TOKEN:'[id number]',TOKEN:'[id number]',TOKEN:'[id number]' TOKTAMMI:'[id number]',PARK:'[id number]'

## 2018-11-19 NOTE — PROVIDER CONTACT NOTE (OTHER) - BACKGROUND
Patient admitted for vaginal bleeding.
Patient admitted for abnormal uterine and vaginal bleeding.
Pt is a 54 y/o female, with diagnosis of cervical cancer, admitted for excessive vaginal bleeding.
Pt status post uterine embolization.
Pt is a 54y/o female, diagnosed with cervical cancer, admitted for excessive vaginal bleeding.

## 2018-11-19 NOTE — DISCHARGE NOTE ADULT - ADDITIONAL INSTRUCTIONS
Follow up with Dr Farrell on 11/26/18 at 4PM.  Follow up with Med Onc on 12/6/18 at 11:20AM.  Rad Onc to call with follow up appointment. Follow up with in clinic in 1 month.  Follow up with Med Onc on 12/6/18 at 11:20AM.  Rad Onc to call with follow up appointment. 1. Follow up with in clinic in 1 month.  2. Follow up with Med Onc on 12/6/18 at 11:20AM.  3. Follow up with Rad Oncology on 11/23/2018 at [facility] at 1030 for your radiation treatment

## 2018-11-19 NOTE — CHART NOTE - NSCHARTNOTEFT_GEN_A_CORE
Translation with patients son:    Patient evaluated at bedside this afternoon. Patient was complaining of dizziness upon ambulation this afternoon. Reports changing pad 3 times today. Currently receiving second unit of blood due to symptomatic anemia. + OOB, Tolerating diet. +Flatus    Objective  T(C): 37.1 (11-19-18 @ 13:41), Max: 37.6 (11-19-18 @ 10:04)  HR: 94 (11-19-18 @ 13:41) (94 - 102)  BP: 103/54 (11-19-18 @ 13:41) (103/54 - 109/44)  RR: 20 (11-19-18 @ 13:41) (18 - 20)  SpO2: 99% (11-19-18 @ 13:41) (97% - 100%)  Wt(kg): --    11-18 @ 07:01  -  11-19 @ 07:00  --------------------------------------------------------  IN: 860 mL / OUT: 5375 mL / NET: -4515 mL    11-19 @ 07:01  -  11-19 @ 17:21  --------------------------------------------------------  IN: 300 mL / OUT: 2500 mL / NET: -2200 mL        Gen: NAD  Abd: Soft NT  : scant dry blood on pad  Ext: NT BL    acetaminophen   Tablet .. 650 milliGRAM(s) Oral every 6 hours PRN  diphenhydrAMINE 25 milliGRAM(s) Oral once PRN  docusate sodium 100 milliGRAM(s) Oral two times a day  enoxaparin Injectable 40 milliGRAM(s) SubCutaneous daily  ibuprofen  Tablet. 600 milliGRAM(s) Oral every 6 hours PRN  insulin lispro (HumaLOG) corrective regimen sliding scale   SubCutaneous three times a day before meals  insulin lispro (HumaLOG) corrective regimen sliding scale   SubCutaneous at bedtime  oxyCODONE    IR 5 milliGRAM(s) Oral every 6 hours PRN    53 year old, HD#3 with Stage IIIB Cervical Carcinoma s/p bilateral uterine artery embolization for vaginal bleeding, s/p SICU stay (HD#1-2) now with symptomatic anemia, currently receiving 2uPRBC.  -Receiving 2uPRBC, 4 hour post transfusion CBC  -Monitor Vital Signs and Pad counts  -ChemoRT scheduled for 11/29    LEO Nicholson, PGY3 Translation with patients son:    Patient evaluated at bedside this afternoon. Patient was complaining of dizziness upon ambulation this afternoon. Reports changing pad 3 times today. Currently receiving second unit of blood due to symptomatic anemia. + OOB, Tolerating diet. +Flatus    Objective  T(C): 37.1 (11-19-18 @ 13:41), Max: 37.6 (11-19-18 @ 10:04)  HR: 94 (11-19-18 @ 13:41) (94 - 102)  BP: 103/54 (11-19-18 @ 13:41) (103/54 - 109/44)  RR: 20 (11-19-18 @ 13:41) (18 - 20)  SpO2: 99% (11-19-18 @ 13:41) (97% - 100%)  Wt(kg): --    11-18 @ 07:01  -  11-19 @ 07:00  --------------------------------------------------------  IN: 860 mL / OUT: 5375 mL / NET: -4515 mL    11-19 @ 07:01  -  11-19 @ 17:21  --------------------------------------------------------  IN: 300 mL / OUT: 2500 mL / NET: -2200 mL        Gen: NAD  Abd: Soft NT  : scant dry blood on pad  Ext: NT BL    acetaminophen   Tablet .. 650 milliGRAM(s) Oral every 6 hours PRN  diphenhydrAMINE 25 milliGRAM(s) Oral once PRN  docusate sodium 100 milliGRAM(s) Oral two times a day  enoxaparin Injectable 40 milliGRAM(s) SubCutaneous daily  ibuprofen  Tablet. 600 milliGRAM(s) Oral every 6 hours PRN  insulin lispro (HumaLOG) corrective regimen sliding scale   SubCutaneous three times a day before meals  insulin lispro (HumaLOG) corrective regimen sliding scale   SubCutaneous at bedtime  oxyCODONE    IR 5 milliGRAM(s) Oral every 6 hours PRN    53 year old, HD#3 with Stage IIIB Cervical Carcinoma s/p bilateral uterine artery embolization for vaginal bleeding, s/p SICU stay (HD#1-2) now with symptomatic anemia, currently receiving 2uPRBC.  -Receiving 2uPRBC, 4 hour post transfusion CBC  -Monitor Vital Signs and Pad counts  -UA positive for large leuks  -ChemoRT scheduled for 11/29    LEO Nicholson, PGY3

## 2018-11-19 NOTE — PROVIDER CONTACT NOTE (OTHER) - ASSESSMENT
Patient VSS, no s/s acute distress, pt passed lemon-sized clot with urination, saturated pad x2 within half hour. Patient c/o RLQ severe pain, pt c/o dizziness and weakness. 2/2 U PRBC finished administration at 2230.

## 2018-11-19 NOTE — DISCHARGE NOTE ADULT - PATIENT PORTAL LINK FT
You can access the [facility] Patient Portal, offered by [facility], by registering with the following website: [email or web address]

## 2018-11-19 NOTE — PROGRESS NOTE ADULT - PROBLEM SELECTOR PLAN 1
Neuro: Pain controlled with oral pain meds.  CV: Hemodynamically stable. Continue to monitor pad counts and vaginal bleeding. s/p 3uPRBC upon admission and bilateral UAE. CBC stable this morning. Will continue to trend.  Pulm: Saturating well on RA.  GI: Continue regular diet as tolerated.   : Voiding spontaneously. Will send UA/Ucx due to dysuria.  Heme: Continue Lovenox/Venodynes for DVT ppx. Increase OOB.  Endo: Continue ISS  Dispo: Outpatient chemoRT w/ Dr. Mcmahan. Will discuss with Rad/Onc and Heme/Onc teams plans for date of initiation of ChemoRT.    LEO Nicholson, PGY3 Neuro: Pain controlled with oral pain meds.  CV: Hemodynamically stable. Continue to monitor pad counts and vaginal bleeding. s/p 3uPRBC upon admission and bilateral UAE. CBC stable this morning. Will continue to trend.  Pulm: Saturating well on RA.  GI: Continue regular diet as tolerated.   : Voiding spontaneously. Will send UA/Ucx due to dysuria.  Heme: Continue Lovenox/Venodynes for DVT ppx. Increase OOB.  Endo: Continue ISS for DMII. Finger sticks controlled.  Dispo: Outpatient chemoRT w/ Dr. Mcmahan. Will discuss with Rad/Onc and Heme/Onc teams plans for date of initiation of ChemoRT.    LEO Nicholson, PGY3 Neuro: Pain controlled with oral pain meds.  CV: Hemodynamically stable. Continue to monitor pad counts and vaginal bleeding. s/p 3uPRBC upon admission and bilateral UAE. CBC stable this morning.   Pulm: Saturating well on RA.  GI: Continue regular diet as tolerated.   : Voiding spontaneously. Will send UA/Ucx due to dysuria.  Heme: Continue Lovenox/Venodynes for DVT ppx. Increase OOB.  Endo: Continue ISS for DMII. Finger sticks controlled.  Dispo: Outpatient chemoRT w/ Dr. Mcmahan. Will discuss with Rad/Onc and Heme/Onc teams plans for date of initiation of ChemoRT.    LEO Nicholson, PGY3

## 2018-11-20 LAB
BASOPHILS # BLD AUTO: 0.02 K/UL — SIGNIFICANT CHANGE UP (ref 0–0.2)
BASOPHILS NFR BLD AUTO: 0.2 % — SIGNIFICANT CHANGE UP (ref 0–2)
BUN SERPL-MCNC: 8 MG/DL — SIGNIFICANT CHANGE UP (ref 7–23)
CALCIUM SERPL-MCNC: 8 MG/DL — LOW (ref 8.4–10.5)
CHLORIDE SERPL-SCNC: 105 MMOL/L — SIGNIFICANT CHANGE UP (ref 98–107)
CO2 SERPL-SCNC: 25 MMOL/L — SIGNIFICANT CHANGE UP (ref 22–31)
CREAT SERPL-MCNC: 0.57 MG/DL — SIGNIFICANT CHANGE UP (ref 0.5–1.3)
EOSINOPHIL # BLD AUTO: 0.13 K/UL — SIGNIFICANT CHANGE UP (ref 0–0.5)
EOSINOPHIL NFR BLD AUTO: 1.1 % — SIGNIFICANT CHANGE UP (ref 0–6)
GLUCOSE BLDC GLUCOMTR-MCNC: 104 MG/DL — HIGH (ref 70–99)
GLUCOSE BLDC GLUCOMTR-MCNC: 126 MG/DL — HIGH (ref 70–99)
GLUCOSE BLDC GLUCOMTR-MCNC: 141 MG/DL — HIGH (ref 70–99)
GLUCOSE BLDC GLUCOMTR-MCNC: 145 MG/DL — HIGH (ref 70–99)
GLUCOSE SERPL-MCNC: 107 MG/DL — HIGH (ref 70–99)
HCT VFR BLD CALC: 27.2 % — LOW (ref 34.5–45)
HCT VFR BLD CALC: 28.3 % — LOW (ref 34.5–45)
HCT VFR BLD CALC: 28.5 % — LOW (ref 34.5–45)
HGB BLD-MCNC: 9.1 G/DL — LOW (ref 11.5–15.5)
HGB BLD-MCNC: 9.4 G/DL — LOW (ref 11.5–15.5)
HGB BLD-MCNC: 9.5 G/DL — LOW (ref 11.5–15.5)
IMM GRANULOCYTES # BLD AUTO: 0.05 # — SIGNIFICANT CHANGE UP
IMM GRANULOCYTES NFR BLD AUTO: 0.4 % — SIGNIFICANT CHANGE UP (ref 0–1.5)
LYMPHOCYTES # BLD AUTO: 19.5 % — SIGNIFICANT CHANGE UP (ref 13–44)
LYMPHOCYTES # BLD AUTO: 2.21 K/UL — SIGNIFICANT CHANGE UP (ref 1–3.3)
MAGNESIUM SERPL-MCNC: 2.1 MG/DL — SIGNIFICANT CHANGE UP (ref 1.6–2.6)
MCHC RBC-ENTMCNC: 28.1 PG — SIGNIFICANT CHANGE UP (ref 27–34)
MCHC RBC-ENTMCNC: 28.2 PG — SIGNIFICANT CHANGE UP (ref 27–34)
MCHC RBC-ENTMCNC: 33.5 % — SIGNIFICANT CHANGE UP (ref 32–36)
MCHC RBC-ENTMCNC: 33.6 % — SIGNIFICANT CHANGE UP (ref 32–36)
MCV RBC AUTO: 84 FL — SIGNIFICANT CHANGE UP (ref 80–100)
MCV RBC AUTO: 84 FL — SIGNIFICANT CHANGE UP (ref 80–100)
MONOCYTES # BLD AUTO: 1.26 K/UL — HIGH (ref 0–0.9)
MONOCYTES NFR BLD AUTO: 11.1 % — SIGNIFICANT CHANGE UP (ref 2–14)
NEUTROPHILS # BLD AUTO: 7.67 K/UL — HIGH (ref 1.8–7.4)
NEUTROPHILS NFR BLD AUTO: 67.7 % — SIGNIFICANT CHANGE UP (ref 43–77)
NRBC # FLD: 0 — SIGNIFICANT CHANGE UP
NRBC # FLD: 0 — SIGNIFICANT CHANGE UP
PHOSPHATE SERPL-MCNC: 3.6 MG/DL — SIGNIFICANT CHANGE UP (ref 2.5–4.5)
PLATELET # BLD AUTO: 176 K/UL — SIGNIFICANT CHANGE UP (ref 150–400)
PLATELET # BLD AUTO: 210 K/UL — SIGNIFICANT CHANGE UP (ref 150–400)
PMV BLD: 10 FL — SIGNIFICANT CHANGE UP (ref 7–13)
PMV BLD: 10.2 FL — SIGNIFICANT CHANGE UP (ref 7–13)
POTASSIUM SERPL-MCNC: 3.6 MMOL/L — SIGNIFICANT CHANGE UP (ref 3.5–5.3)
POTASSIUM SERPL-SCNC: 3.6 MMOL/L — SIGNIFICANT CHANGE UP (ref 3.5–5.3)
RBC # BLD: 3.24 M/UL — LOW (ref 3.8–5.2)
RBC # BLD: 3.37 M/UL — LOW (ref 3.8–5.2)
RBC # FLD: 14.6 % — HIGH (ref 10.3–14.5)
RBC # FLD: 14.7 % — HIGH (ref 10.3–14.5)
SODIUM SERPL-SCNC: 139 MMOL/L — SIGNIFICANT CHANGE UP (ref 135–145)
WBC # BLD: 10.75 K/UL — HIGH (ref 3.8–10.5)
WBC # BLD: 11.34 K/UL — HIGH (ref 3.8–10.5)
WBC # FLD AUTO: 10.75 K/UL — HIGH (ref 3.8–10.5)
WBC # FLD AUTO: 11.34 K/UL — HIGH (ref 3.8–10.5)

## 2018-11-20 PROCEDURE — 77334 RADIATION TREATMENT AID(S): CPT | Mod: 26

## 2018-11-20 PROCEDURE — 77280 THER RAD SIMULAJ FIELD SMPL: CPT | Mod: 26

## 2018-11-20 PROCEDURE — 77300 RADIATION THERAPY DOSE PLAN: CPT | Mod: 26

## 2018-11-20 PROCEDURE — 77295 3-D RADIOTHERAPY PLAN: CPT | Mod: 26

## 2018-11-20 PROCEDURE — 99232 SBSQ HOSP IP/OBS MODERATE 35: CPT | Mod: GC

## 2018-11-20 PROCEDURE — 99231 SBSQ HOSP IP/OBS SF/LOW 25: CPT

## 2018-11-20 RX ORDER — HYDROMORPHONE HYDROCHLORIDE 2 MG/ML
1 INJECTION INTRAMUSCULAR; INTRAVENOUS; SUBCUTANEOUS ONCE
Qty: 0 | Refills: 0 | Status: DISCONTINUED | OUTPATIENT
Start: 2018-11-20 | End: 2018-11-20

## 2018-11-20 RX ADMIN — Medication 600 MILLIGRAM(S): at 15:14

## 2018-11-20 RX ADMIN — HYDROMORPHONE HYDROCHLORIDE 1 MILLIGRAM(S): 2 INJECTION INTRAMUSCULAR; INTRAVENOUS; SUBCUTANEOUS at 00:29

## 2018-11-20 RX ADMIN — OXYCODONE HYDROCHLORIDE 5 MILLIGRAM(S): 5 TABLET ORAL at 10:37

## 2018-11-20 RX ADMIN — Medication 100 MILLIGRAM(S): at 06:03

## 2018-11-20 RX ADMIN — ENOXAPARIN SODIUM 40 MILLIGRAM(S): 100 INJECTION SUBCUTANEOUS at 11:52

## 2018-11-20 RX ADMIN — HYDROMORPHONE HYDROCHLORIDE 1 MILLIGRAM(S): 2 INJECTION INTRAMUSCULAR; INTRAVENOUS; SUBCUTANEOUS at 00:45

## 2018-11-20 RX ADMIN — Medication 100 MILLIGRAM(S): at 18:42

## 2018-11-20 RX ADMIN — Medication 600 MILLIGRAM(S): at 15:44

## 2018-11-20 RX ADMIN — OXYCODONE HYDROCHLORIDE 5 MILLIGRAM(S): 5 TABLET ORAL at 10:07

## 2018-11-20 NOTE — PROGRESS NOTE ADULT - ASSESSMENT
Patient is a 54 y/o F with PMH DM and recently diagnosed stage IIIB cervical cancer who presents with vaginal bleeding with clots now s/p IR embolization    # Stage IIIB cervical cancer  - Recently diagnosed in 10/2018 at outside hospital  - PET scan done on 1/7/18 with no evidence of metastatic disease  - Plan was to start concurrent chemoradiation with Cisplatin. Explained risk and benefits of chemotherapy. She was given the opportunity to answer questions. Cisplatin will be start tomorrow. IT will be weekly cisplatin.   -Appreciated Rad-onc recs    [facility]   Oncology fellow

## 2018-11-20 NOTE — CHART NOTE - NSCHARTNOTEFT_GEN_A_CORE
Patient evaluated at bedside this afternoon. Radiation was performed today. Has gone through 3 pads over the course of the day. Denies dizziness, lighteadedness. Denies complaints. + OOB, Tolerating diet. Graham and  in place.    Objective  T(C): 36.8 (11-20-18 @ 14:00), Max: 36.8 (11-20-18 @ 14:00)  HR: 68 (11-20-18 @ 14:00) (68 - 83)  BP: 111/56 (11-20-18 @ 14:00) (101/50 - 118/62)  RR: 17 (11-20-18 @ 14:00) (17 - 17)  SpO2: 98% (11-20-18 @ 14:00) (97% - 98%)  Wt(kg): --    11-19 @ 07:01  -  11-20 @ 07:00  --------------------------------------------------------  IN: 1210 mL / OUT: 3650 mL / NET: -2440 mL    11-20 @ 07:01  -  11-20 @ 17:17  --------------------------------------------------------  IN: 0 mL / OUT: 1580 mL / NET: -1580 mL        Gen: NAD  Abd: Soft NT  : scant blood on pad  Ext: NT BL    acetaminophen   Tablet .. 650 milliGRAM(s) Oral every 6 hours PRN  diphenhydrAMINE 25 milliGRAM(s) Oral once PRN  docusate sodium 100 milliGRAM(s) Oral two times a day  enoxaparin Injectable 40 milliGRAM(s) SubCutaneous daily  ibuprofen  Tablet. 600 milliGRAM(s) Oral every 6 hours PRN  insulin lispro (HumaLOG) corrective regimen sliding scale   SubCutaneous three times a day before meals  insulin lispro (HumaLOG) corrective regimen sliding scale   SubCutaneous at bedtime  oxyCODONE    IR 5 milliGRAM(s) Oral every 6 hours PRN    54y/o HD#4 from heavy vaginal bleeding s/p bilateral UAE on HD#1 requiring SICU admission and acute blood loss anemia s/p 5 uPRBC transfusion. PMHx Stage IIIB cervical carcinoma. Current issues include acute blood loss.   -s/p RT #1 today  -Graham and  in place  -Continue to monitor VB    LEO Nicholson, PGY3

## 2018-11-20 NOTE — PROGRESS NOTE ADULT - PROBLEM SELECTOR PLAN 1
Neuro: c/w po pain meds  CV: insufficient rise in H/H following transfusion, consider repeat CBC at 12p to trend H/H  Pulm: Saturating well on room air, encourage incentive spirometry  GI: c/w regular diet  : hairston in situ, d/c hairston once H/H stable,  x1 in place  Heme: c/w lovenox and SCDs for DVT ppx  Endo: ISS for glucose control    BABAR Mills [email or web address]

## 2018-11-20 NOTE — CHART NOTE - NSCHARTNOTEFT_GEN_A_CORE
R2 GYN ONC Event Note    Called to bedside for bright red vaginal bleeding.  Pt expressed 200cc of clots and blood.  Pt denies lightheadedness, SOB, CP, dizziness, but does endorse pelvic pain.    Vital Signs Last 24 Hrs  T(C): 36.9 (2018 22:44), Max: 37.6 (2018 10:04)  T(F): 98.4 (2018 22:44), Max: 99.7 (2018 10:04)  HR: 76 (2018 22:44) (75 - 102)  BP: 115/63 (2018 22:44) (102/54 - 115/63)  RR: 17 (2018 22:44) (16 - 20)  SpO2: 98% (2018 22:44) (97% - 100%)      18 @ 07:01  -  18 @ 07:00  --------------------------------------------------------  IN: 860 mL / OUT: 5375 mL / NET: -4515 mL    18 @ 07:01  -  18 @ 00:19  --------------------------------------------------------  IN: 960 mL / OUT: 3350 mL / NET: -2390 mL        PE:  Gen: NAD  CV: RRR  Pulm: CTAB  Abd: Soft, mild tenderness in lower abdomen, no guarding or rebound, non distended  Ext: No edema or tenderness bilaterally, venodynes in place  Spec Exam: large clots removed from vaginal canal, upon removal, bright red blood pooling in vagina, total of approximately 300cc removed  Graham catheter inserted w clear yellow urine  VPx1 placed and tied to catheter    LABS:                        7.7    9.39  )-----------( 197      ( 2018 11:30 )             23.8                         9.1    9.58  )-----------( 200      ( 2018 05:00 )             27.8                         9.8    8.46  )-----------( 174      ( 2018 06:10 )             29.5         140  |  105  |  4<L>  ----------------------------<  110<H>  3.7   |  25  |  0.50    Ca    8.2<L>      2018 06:00  Phos  2.7       Mg     2.0         TPro  6.2  /  Alb  3.0<L>  /  TBili  0.3  /  DBili  x   /  AST  40<H>  /  ALT  27  /  AlkPhos  66        Urinalysis Basic - ( 2018 13:10 )    Color: PINK / Appearance: Lt TURBID / S.015 / pH: 8.0  Gluc: NEGATIVE / Ketone: NEGATIVE  / Bili: NEGATIVE / Urobili: 0.2   Blood: LARGE / Protein: 100 / Nitrite: NEGATIVE   Leuk Esterase: LARGE / RBC: 25-50 / WBC >50   Sq Epi: MOD / Non Sq Epi: x / Bacteria: SMALL    A/P: 53 year old, HD#3 with Stage IIIB Cervical Carcinoma s/p bilateral uterine artery embolization for vaginal bleeding, s/p SICU stay (HD#1-2), w episode of vaginal bleeding, approx 500cc in total.  - will recheck H/H  - monitor VB, clinical status, vital signs closely    Pt seen w N Sample PGY4, d/w Dr. Shane Sofia PGY2

## 2018-11-20 NOTE — PROGRESS NOTE ADULT - ATTENDING COMMENTS
Pt seen, examined and d/w fellow. Agree with above A/P. She is s/p IR embolization with decreased but persistent bleeding. PE abd +BS soft and NT. A/P Prev and again today have d/w pt th pot risks benefist and side effects of CDDP given as a chemo-sensitizer along with XRT for local control of  her disease and to decrease her bleeding. She agrees to proceed, likely tomorrow.

## 2018-11-20 NOTE — PROGRESS NOTE ADULT - ASSESSMENT
52y/o HD#4 from heavy vaginal bleeding s/p bilateral UAE on HD#1 requiring SICU admission and acute blood loss anemia s/p 5 uPRBC transfusion. PMHx Stage IIIB cervical carcinoma. Current issues include acute blood loss.

## 2018-11-20 NOTE — PROGRESS NOTE ADULT - SUBJECTIVE AND OBJECTIVE BOX
Hematology Follow-up    INTERVAL HPI/OVERNIGHT EVENTS:  Patient S&E at bedside.     T(F): 97.4 (18 @ 10:40)  HR: 83 (18 @ 10:40)  BP: 118/62 (18 @ 10:40)  RR: 17 (18 @ 10:40)  SpO2: 97% (18 @ 10:40)  Wt(kg): --    PHYSICAL EXAM:    Constitutional: AAOx3, NAD,   Eyes: PERRL, EOMI, sclera non-icteric  Neck: supple, no masses, no JVD  Respiratory: CTA b/l, good air entry b/l, no wheezing, rhonchi, rales, with normal respiratory effort and no intercostal retractions  Cardiovascular: RRR, normal S1S2, no M/R/G  Gastrointestinal: soft, NTND, no masses palpable, BS normal in all four quadrants, no HSM  Extremities:  no c/c/e  Neurological: Grossly intact  Skin: Normal temperature    MEDICATIONS  (STANDING):  docusate sodium 100 milliGRAM(s) Oral two times a day  enoxaparin Injectable 40 milliGRAM(s) SubCutaneous daily  insulin lispro (HumaLOG) corrective regimen sliding scale   SubCutaneous three times a day before meals  insulin lispro (HumaLOG) corrective regimen sliding scale   SubCutaneous at bedtime    MEDICATIONS  (PRN):  acetaminophen   Tablet .. 650 milliGRAM(s) Oral every 6 hours PRN Mild Pain (1 - 3)  diphenhydrAMINE 25 milliGRAM(s) Oral once PRN Rash and/or Itching  ibuprofen  Tablet. 600 milliGRAM(s) Oral every 6 hours PRN Moderate Pain (4 - 6)  oxyCODONE    IR 5 milliGRAM(s) Oral every 6 hours PRN Severe Pain (7 - 10)      amoxicillin (Unknown)      LABS:                        9.1    11.34 )-----------( 176      ( 2018 04:10 )             27.2     -    139  |  105  |  8   ----------------------------<  107<H>  3.6   |  25  |  0.57    Ca    8.0<L>      2018 04:10  Phos  3.6     11-20  Mg     2.1     11-20         Urinalysis Basic - ( 2018 13:10 )    Color: PINK / Appearance: Lt TURBID / S.015 / pH: 8.0  Gluc: NEGATIVE / Ketone: NEGATIVE  / Bili: NEGATIVE / Urobili: 0.2   Blood: LARGE / Protein: 100 / Nitrite: NEGATIVE   Leuk Esterase: LARGE / RBC: 25-50 / WBC >50   Sq Epi: MOD / Non Sq Epi: x / Bacteria: SMALL        RADIOLOGY & ADDITIONAL TESTS:  Studies reviewed. Hematology Follow-up    INTERVAL HPI/OVERNIGHT EVENTS:  Patient S&E at bedside. She passed a big clot last night.     T(F): 97.4 (18 @ 10:40)  HR: 83 (18 @ 10:40)  BP: 118/62 (18 @ 10:40)  RR: 17 (18 @ 10:40)  SpO2: 97% (18 @ 10:40)  Wt(kg): --    PHYSICAL EXAM:    Constitutional: AAOx3, NAD,   Eyes: PERRL, EOMI, sclera non-icteric  Neck: supple, no masses, no JVD  Respiratory: CTA b/l, good air entry b/l, no wheezing, rhonchi, rales, with normal respiratory effort and no intercostal retractions  Cardiovascular: RRR, normal S1S2, no M/R/G  Gastrointestinal: soft, NTND, no masses palpable, BS normal in all four quadrants, no HSM  Extremities:  no c/c/e  Neurological: Grossly intact  Skin: Normal temperature    MEDICATIONS  (STANDING):  docusate sodium 100 milliGRAM(s) Oral two times a day  enoxaparin Injectable 40 milliGRAM(s) SubCutaneous daily  insulin lispro (HumaLOG) corrective regimen sliding scale   SubCutaneous three times a day before meals  insulin lispro (HumaLOG) corrective regimen sliding scale   SubCutaneous at bedtime    MEDICATIONS  (PRN):  acetaminophen   Tablet .. 650 milliGRAM(s) Oral every 6 hours PRN Mild Pain (1 - 3)  diphenhydrAMINE 25 milliGRAM(s) Oral once PRN Rash and/or Itching  ibuprofen  Tablet. 600 milliGRAM(s) Oral every 6 hours PRN Moderate Pain (4 - 6)  oxyCODONE    IR 5 milliGRAM(s) Oral every 6 hours PRN Severe Pain (7 - 10)      amoxicillin (Unknown)      LABS:                        9.1    11.34 )-----------( 176      ( 2018 04:10 )             27.2     11-    139  |  105  |  8   ----------------------------<  107<H>  3.6   |  25  |  0.57    Ca    8.0<L>      2018 04:10  Phos  3.6     11-20  Mg     2.1     11-20         Urinalysis Basic - ( 2018 13:10 )    Color: PINK / Appearance: Lt TURBID / S.015 / pH: 8.0  Gluc: NEGATIVE / Ketone: NEGATIVE  / Bili: NEGATIVE / Urobili: 0.2   Blood: LARGE / Protein: 100 / Nitrite: NEGATIVE   Leuk Esterase: LARGE / RBC: 25-50 / WBC >50   Sq Epi: MOD / Non Sq Epi: x / Bacteria: SMALL        RADIOLOGY & ADDITIONAL TESTS:  Studies reviewed.

## 2018-11-20 NOTE — PROGRESS NOTE ADULT - ATTENDING COMMENTS
Pt seen and examined, agree with gyn housestaff  Hgb stable  Vaginal packing in place  Monitor bleeding and trend CBC  Consult radiation oncology for inpatient RT

## 2018-11-21 DIAGNOSIS — Z71.89 OTHER SPECIFIED COUNSELING: ICD-10-CM

## 2018-11-21 DIAGNOSIS — C53.9 MALIGNANT NEOPLASM OF CERVIX UTERI, UNSPECIFIED: ICD-10-CM

## 2018-11-21 LAB
BACTERIA UR CULT: SIGNIFICANT CHANGE UP
BASOPHILS # BLD AUTO: 0.02 K/UL — SIGNIFICANT CHANGE UP (ref 0–0.2)
BASOPHILS NFR BLD AUTO: 0.2 % — SIGNIFICANT CHANGE UP (ref 0–2)
BLD GP AB SCN SERPL QL: NEGATIVE — SIGNIFICANT CHANGE UP
BUN SERPL-MCNC: 8 MG/DL — SIGNIFICANT CHANGE UP (ref 7–23)
CALCIUM SERPL-MCNC: 8.5 MG/DL — SIGNIFICANT CHANGE UP (ref 8.4–10.5)
CHLORIDE SERPL-SCNC: 105 MMOL/L — SIGNIFICANT CHANGE UP (ref 98–107)
CO2 SERPL-SCNC: 24 MMOL/L — SIGNIFICANT CHANGE UP (ref 22–31)
CREAT SERPL-MCNC: 0.42 MG/DL — LOW (ref 0.5–1.3)
EOSINOPHIL # BLD AUTO: 0.16 K/UL — SIGNIFICANT CHANGE UP (ref 0–0.5)
EOSINOPHIL NFR BLD AUTO: 1.8 % — SIGNIFICANT CHANGE UP (ref 0–6)
GLUCOSE BLDC GLUCOMTR-MCNC: 109 MG/DL — HIGH (ref 70–99)
GLUCOSE BLDC GLUCOMTR-MCNC: 137 MG/DL — HIGH (ref 70–99)
GLUCOSE BLDC GLUCOMTR-MCNC: 153 MG/DL — HIGH (ref 70–99)
GLUCOSE BLDC GLUCOMTR-MCNC: 163 MG/DL — HIGH (ref 70–99)
GLUCOSE SERPL-MCNC: 97 MG/DL — SIGNIFICANT CHANGE UP (ref 70–99)
HCT VFR BLD CALC: 27.9 % — LOW (ref 34.5–45)
HGB BLD-MCNC: 9.2 G/DL — LOW (ref 11.5–15.5)
IMM GRANULOCYTES # BLD AUTO: 0.06 # — SIGNIFICANT CHANGE UP
IMM GRANULOCYTES NFR BLD AUTO: 0.7 % — SIGNIFICANT CHANGE UP (ref 0–1.5)
LYMPHOCYTES # BLD AUTO: 1.89 K/UL — SIGNIFICANT CHANGE UP (ref 1–3.3)
LYMPHOCYTES # BLD AUTO: 20.7 % — SIGNIFICANT CHANGE UP (ref 13–44)
MAGNESIUM SERPL-MCNC: 2.2 MG/DL — SIGNIFICANT CHANGE UP (ref 1.6–2.6)
MCHC RBC-ENTMCNC: 28.3 PG — SIGNIFICANT CHANGE UP (ref 27–34)
MCHC RBC-ENTMCNC: 33 % — SIGNIFICANT CHANGE UP (ref 32–36)
MCV RBC AUTO: 85.8 FL — SIGNIFICANT CHANGE UP (ref 80–100)
MONOCYTES # BLD AUTO: 0.82 K/UL — SIGNIFICANT CHANGE UP (ref 0–0.9)
MONOCYTES NFR BLD AUTO: 9 % — SIGNIFICANT CHANGE UP (ref 2–14)
NEUTROPHILS # BLD AUTO: 6.16 K/UL — SIGNIFICANT CHANGE UP (ref 1.8–7.4)
NEUTROPHILS NFR BLD AUTO: 67.6 % — SIGNIFICANT CHANGE UP (ref 43–77)
NRBC # FLD: 0 — SIGNIFICANT CHANGE UP
PHOSPHATE SERPL-MCNC: 4.1 MG/DL — SIGNIFICANT CHANGE UP (ref 2.5–4.5)
PLATELET # BLD AUTO: 240 K/UL — SIGNIFICANT CHANGE UP (ref 150–400)
PMV BLD: 9.8 FL — SIGNIFICANT CHANGE UP (ref 7–13)
POTASSIUM SERPL-MCNC: 3.7 MMOL/L — SIGNIFICANT CHANGE UP (ref 3.5–5.3)
POTASSIUM SERPL-SCNC: 3.7 MMOL/L — SIGNIFICANT CHANGE UP (ref 3.5–5.3)
RBC # BLD: 3.25 M/UL — LOW (ref 3.8–5.2)
RBC # FLD: 14.6 % — HIGH (ref 10.3–14.5)
RH IG SCN BLD-IMP: POSITIVE — SIGNIFICANT CHANGE UP
SODIUM SERPL-SCNC: 141 MMOL/L — SIGNIFICANT CHANGE UP (ref 135–145)
SPECIMEN SOURCE: SIGNIFICANT CHANGE UP
WBC # BLD: 9.11 K/UL — SIGNIFICANT CHANGE UP (ref 3.8–10.5)
WBC # FLD AUTO: 9.11 K/UL — SIGNIFICANT CHANGE UP (ref 3.8–10.5)

## 2018-11-21 PROCEDURE — 99232 SBSQ HOSP IP/OBS MODERATE 35: CPT | Mod: GC

## 2018-11-21 RX ORDER — METOCLOPRAMIDE HCL 10 MG
10 TABLET ORAL EVERY 8 HOURS
Qty: 0 | Refills: 0 | Status: DISCONTINUED | OUTPATIENT
Start: 2018-11-21 | End: 2018-11-22

## 2018-11-21 RX ORDER — DOCUSATE SODIUM 100 MG
1 CAPSULE ORAL
Qty: 0 | Refills: 0 | COMMUNITY
Start: 2018-11-21

## 2018-11-21 RX ORDER — SODIUM CHLORIDE 9 MG/ML
1000 INJECTION, SOLUTION INTRAVENOUS
Qty: 0 | Refills: 0 | Status: DISCONTINUED | OUTPATIENT
Start: 2018-11-21 | End: 2018-11-21

## 2018-11-21 RX ORDER — SODIUM CHLORIDE 9 MG/ML
1000 INJECTION, SOLUTION INTRAVENOUS
Qty: 0 | Refills: 0 | Status: COMPLETED | OUTPATIENT
Start: 2018-11-21 | End: 2018-11-21

## 2018-11-21 RX ORDER — PALONOSETRON HYDROCHLORIDE 0.25 MG/5ML
0.25 INJECTION, SOLUTION INTRAVENOUS ONCE
Qty: 0 | Refills: 0 | Status: COMPLETED | OUTPATIENT
Start: 2018-11-21 | End: 2018-11-21

## 2018-11-21 RX ORDER — DEXAMETHASONE 0.5 MG/5ML
10 ELIXIR ORAL ONCE
Qty: 0 | Refills: 0 | Status: COMPLETED | OUTPATIENT
Start: 2018-11-21 | End: 2018-11-21

## 2018-11-21 RX ORDER — SENNA PLUS 8.6 MG/1
2 TABLET ORAL AT BEDTIME
Qty: 0 | Refills: 0 | Status: DISCONTINUED | OUTPATIENT
Start: 2018-11-21 | End: 2018-11-22

## 2018-11-21 RX ORDER — FOSAPREPITANT DIMEGLUMINE 150 MG/5ML
150 INJECTION, POWDER, LYOPHILIZED, FOR SOLUTION INTRAVENOUS ONCE
Qty: 0 | Refills: 0 | Status: COMPLETED | OUTPATIENT
Start: 2018-11-21 | End: 2018-11-21

## 2018-11-21 RX ORDER — SENNA PLUS 8.6 MG/1
2 TABLET ORAL
Qty: 0 | Refills: 0 | COMMUNITY
Start: 2018-11-21

## 2018-11-21 RX ORDER — CISPLATIN 1 MG/ML
65 INJECTION, SOLUTION INTRAVENOUS ONCE
Qty: 0 | Refills: 0 | Status: COMPLETED | OUTPATIENT
Start: 2018-11-21 | End: 2018-11-21

## 2018-11-21 RX ORDER — POTASSIUM CHLORIDE 20 MEQ
20 PACKET (EA) ORAL ONCE
Qty: 0 | Refills: 0 | Status: COMPLETED | OUTPATIENT
Start: 2018-11-21 | End: 2018-11-21

## 2018-11-21 RX ADMIN — Medication 100 MILLIGRAM(S): at 05:42

## 2018-11-21 RX ADMIN — SODIUM CHLORIDE 1000 MILLILITER(S): 9 INJECTION, SOLUTION INTRAVENOUS at 11:29

## 2018-11-21 RX ADMIN — Medication 600 MILLIGRAM(S): at 22:00

## 2018-11-21 RX ADMIN — PALONOSETRON HYDROCHLORIDE 0.25 MILLIGRAM(S): 0.25 INJECTION, SOLUTION INTRAVENOUS at 12:51

## 2018-11-21 RX ADMIN — Medication 600 MILLIGRAM(S): at 21:14

## 2018-11-21 RX ADMIN — Medication 100 MILLIGRAM(S): at 17:40

## 2018-11-21 RX ADMIN — ENOXAPARIN SODIUM 40 MILLIGRAM(S): 100 INJECTION SUBCUTANEOUS at 13:27

## 2018-11-21 RX ADMIN — CISPLATIN 250 MILLIGRAM(S): 1 INJECTION, SOLUTION INTRAVENOUS at 14:40

## 2018-11-21 RX ADMIN — Medication 650 MILLIGRAM(S): at 05:45

## 2018-11-21 RX ADMIN — Medication 102 MILLIGRAM(S): at 12:53

## 2018-11-21 RX ADMIN — FOSAPREPITANT DIMEGLUMINE 465 MILLIGRAM(S): 150 INJECTION, POWDER, LYOPHILIZED, FOR SOLUTION INTRAVENOUS at 13:16

## 2018-11-21 RX ADMIN — Medication 1: at 17:41

## 2018-11-21 RX ADMIN — Medication 650 MILLIGRAM(S): at 06:30

## 2018-11-21 RX ADMIN — Medication 20 MILLIEQUIVALENT(S): at 17:40

## 2018-11-21 RX ADMIN — SENNA PLUS 2 TABLET(S): 8.6 TABLET ORAL at 21:14

## 2018-11-21 RX ADMIN — Medication 600 MILLIGRAM(S): at 00:35

## 2018-11-21 RX ADMIN — Medication 600 MILLIGRAM(S): at 01:35

## 2018-11-21 NOTE — CHART NOTE - NSCHARTNOTESELECT_GEN_ALL_CORE
Event Note
Event Note/Gyn Onc
Onc/Event Note
r2 gyn onc note/Event Note
elin

## 2018-11-21 NOTE — CHART NOTE - NSCHARTNOTEFT_GEN_A_CORE
Patient evaluated at bedside this afternoon. Denies dizziness, lightheadedness. Voiding.  + OOB, Tolerating diet. Changed pad 3 times today. Had 1st round of chemo and dose of radiation today.    Objective  T(C): 36.6 (11-21-18 @ 09:26), Max: 36.7 (11-21-18 @ 05:59)  HR: 78 (11-21-18 @ 09:26) (70 - 78)  BP: 100/50 (11-21-18 @ 09:26) (100/50 - 110/65)  RR: 18 (11-21-18 @ 09:26) (18 - 19)  SpO2: 99% (11-21-18 @ 09:26) (98% - 99%)  Wt(kg): --    11-20 @ 07:01  -  11-21 @ 07:00  --------------------------------------------------------  IN: 0 mL / OUT: 4280 mL / NET: -4280 mL    11-21 @ 07:01  -  11-21 @ 16:57  --------------------------------------------------------  IN: 0 mL / OUT: 850 mL / NET: -850 mL        Gen: NAD  Abd: Soft NT  : scant bleeding on pad  Ext: NT BL    acetaminophen   Tablet .. 650 milliGRAM(s) Oral every 6 hours PRN  docusate sodium 100 milliGRAM(s) Oral two times a day  enoxaparin Injectable 40 milliGRAM(s) SubCutaneous daily  ibuprofen  Tablet. 600 milliGRAM(s) Oral every 6 hours PRN  insulin lispro (HumaLOG) corrective regimen sliding scale   SubCutaneous three times a day before meals  insulin lispro (HumaLOG) corrective regimen sliding scale   SubCutaneous at bedtime  metoclopramide Injectable 10 milliGRAM(s) IV Push every 8 hours PRN  oxyCODONE    IR 5 milliGRAM(s) Oral every 6 hours PRN  potassium chloride    Tablet ER 20 milliEquivalent(s) Oral once  senna 2 Tablet(s) Oral at bedtime    54y/o HD#5 a/w heavy vaginal bleeding s/p UAE on HD#1 for stage IIIB cervical cancer. Hospital course has been complicated by acute blood loss anemia s/p 5 uPRBCs. Started inpatient XRT on HD#4 (11/20/18) and Chemo today (11/21). Clinical condition improving.  -Graham and  removed this AM  -s/p Chemo and RT today  -scant bleeding on pad  -Continue to monitor sonia Nicholson, PGY3

## 2018-11-21 NOTE — PROGRESS NOTE ADULT - SUBJECTIVE AND OBJECTIVE BOX
HD#5    Patient seen and examined at bedside. No acute overnight events. Patient continues to have light vaginal bleeding and reports no clots overnight. She denies dizziness and lightheadedness.  Patient is ambulating, voiding spontaneously and tolerating regular diet. Denies CP, SOB, N/V, fevers, and chills.    Vital Signs Last 24 Hours  T(C): 36.9 (11-21-18 @ 02:06), Max: 36.9 (11-21-18 @ 02:06)  HR: 69 (11-21-18 @ 02:06) (68 - 83)  BP: 109/63 (11-21-18 @ 02:06) (101/50 - 147/60)  RR: 16 (11-21-18 @ 02:06) (16 - 19)  SpO2: 99% (11-21-18 @ 02:06) (97% - 99%)    I&O's Summary    19 Nov 2018 07:01  -  20 Nov 2018 07:00  --------------------------------------------------------  IN: 1210 mL / OUT: 3650 mL / NET: -2440 mL    20 Nov 2018 07:01  -  21 Nov 2018 05:39  --------------------------------------------------------  IN: 0 mL / OUT: 3880 mL / NET: -3880 mL        Physical Exam:  General: NAD  CV: RRR, S1, S2  Lungs: CTAB, symmetrical expansion  Abdomen: Soft, non-tender, non-distended, +BS  : hairston in situ, vaginal packing x1 that is saturated, scant vaginal bleeding noted on underlying pad  Ext: No pain or swelling    Labs:                        9.5    10.75 )-----------( 210      ( 20 Nov 2018 12:01 )             28.3   baso x      eos x      imm gran x      lymph x      mono x      poly x                            9.1    11.34 )-----------( 176      ( 20 Nov 2018 04:10 )             27.2   baso 0.2    eos 1.1    imm gran 0.4    lymph 19.5   mono 11.1   poly 67.7                         9.4    x     )-----------( x        ( 20 Nov 2018 00:13 )             28.5   baso x      eos x      imm gran x      lymph x      mono x      poly x                            7.7    9.39  )-----------( 197      ( 19 Nov 2018 11:30 )             23.8   baso 0.3    eos 0.4    imm gran 0.7    lymph 17.4   mono 13.1   poly 68.1                         9.1    9.58  )-----------( 200      ( 19 Nov 2018 05:00 )             27.8   baso 0.3    eos 0.7    imm gran 0.8    lymph 20.6   mono 14.2   poly 63.4                         9.8    8.46  )-----------( 174      ( 18 Nov 2018 06:10 )             29.5   baso 0.4    eos 0.2    imm gran 0.9    lymph 16.4   mono 11.9   poly 70.2       MEDICATIONS  (STANDING):  docusate sodium 100 milliGRAM(s) Oral two times a day  enoxaparin Injectable 40 milliGRAM(s) SubCutaneous daily  insulin lispro (HumaLOG) corrective regimen sliding scale   SubCutaneous three times a day before meals  insulin lispro (HumaLOG) corrective regimen sliding scale   SubCutaneous at bedtime    MEDICATIONS  (PRN):  acetaminophen   Tablet .. 650 milliGRAM(s) Oral every 6 hours PRN Mild Pain (1 - 3)  diphenhydrAMINE 25 milliGRAM(s) Oral once PRN Rash and/or Itching  ibuprofen  Tablet. 600 milliGRAM(s) Oral every 6 hours PRN Moderate Pain (4 - 6)  oxyCODONE    IR 5 milliGRAM(s) Oral every 6 hours PRN Severe Pain (7 - 10) HD#5    Patient seen and examined at bedside. No acute overnight events. Patient continues to have light vaginal bleeding and reports no clots overnight. She denies dizziness and lightheadedness.  Patient is ambulating, voiding spontaneously and tolerating regular diet. Denies CP, SOB, N/V, fevers, and chills.    Vital Signs Last 24 Hours  T(C): 36.9 (11-21-18 @ 02:06), Max: 36.9 (11-21-18 @ 02:06)  HR: 69 (11-21-18 @ 02:06) (68 - 83)  BP: 109/63 (11-21-18 @ 02:06) (101/50 - 147/60)  RR: 16 (11-21-18 @ 02:06) (16 - 19)  SpO2: 99% (11-21-18 @ 02:06) (97% - 99%)    I&O's Summary    19 Nov 2018 07:01  -  20 Nov 2018 07:00  --------------------------------------------------------  IN: 1210 mL / OUT: 3650 mL / NET: -2440 mL    20 Nov 2018 07:01  -  21 Nov 2018 05:39  --------------------------------------------------------  IN: 0 mL / OUT: 3880 mL / NET: -3880 mL        Physical Exam:  General: NAD  CV: RRR, S1, S2  Lungs: CTAB, symmetrical expansion  Abdomen: Soft, non-tender, non-distended, +BS  : hairston with clear urine removed during rounds, vaginal packing removed (it is 100% saturated with old blood), no bleeding onto cleopatra after removal of packing	  Ext: No pain or swelling    Labs:                        9.5    10.75 )-----------( 210      ( 20 Nov 2018 12:01 )             28.3   baso x      eos x      imm gran x      lymph x      mono x      poly x                            9.1    11.34 )-----------( 176      ( 20 Nov 2018 04:10 )             27.2   baso 0.2    eos 1.1    imm gran 0.4    lymph 19.5   mono 11.1   poly 67.7                         9.4    x     )-----------( x        ( 20 Nov 2018 00:13 )             28.5   baso x      eos x      imm gran x      lymph x      mono x      poly x                            7.7    9.39  )-----------( 197      ( 19 Nov 2018 11:30 )             23.8   baso 0.3    eos 0.4    imm gran 0.7    lymph 17.4   mono 13.1   poly 68.1                         9.1    9.58  )-----------( 200      ( 19 Nov 2018 05:00 )             27.8   baso 0.3    eos 0.7    imm gran 0.8    lymph 20.6   mono 14.2   poly 63.4                         9.8    8.46  )-----------( 174      ( 18 Nov 2018 06:10 )             29.5   baso 0.4    eos 0.2    imm gran 0.9    lymph 16.4   mono 11.9   poly 70.2       MEDICATIONS  (STANDING):  docusate sodium 100 milliGRAM(s) Oral two times a day  enoxaparin Injectable 40 milliGRAM(s) SubCutaneous daily  insulin lispro (HumaLOG) corrective regimen sliding scale   SubCutaneous three times a day before meals  insulin lispro (HumaLOG) corrective regimen sliding scale   SubCutaneous at bedtime    MEDICATIONS  (PRN):  acetaminophen   Tablet .. 650 milliGRAM(s) Oral every 6 hours PRN Mild Pain (1 - 3)  diphenhydrAMINE 25 milliGRAM(s) Oral once PRN Rash and/or Itching  ibuprofen  Tablet. 600 milliGRAM(s) Oral every 6 hours PRN Moderate Pain (4 - 6)  oxyCODONE    IR 5 milliGRAM(s) Oral every 6 hours PRN Severe Pain (7 - 10)

## 2018-11-21 NOTE — PROGRESS NOTE ADULT - ATTENDING COMMENTS
Pt seen, examined and d/w fellow. Agree with above A/P. H/O locally advanced cervical ca nd associated bleeding s/p IR embolization. Started XRT and s/p CDDP> Tolerated well. To continue with weekly CDDP concomitant with ongoing XRT. Tjhsi can be cont'd as outpt at the [facility] if and when the pt is deemed stable for d/c

## 2018-11-21 NOTE — PROGRESS NOTE ADULT - ASSESSMENT
Patient is a 54 y/o F with PMH DM and recently diagnosed stage IIIB cervical cancer who presents with vaginal bleeding with clots now s/p IR embolization    # Stage IIIB cervical cancer s/p RT day 1 on 11/20  - Recently diagnosed in 10/2018 at outside hospital  - PET scan done on 1/7/18 with no evidence of metastatic disease  - concurrent ChemoRT with Cisplatin started today 11/21/. If patient is clinically stable, from oncology she is ok to continue therapy as outpatient depending of radiation schedule.   -Appreciated Rad-onc recs    William [facility]   Oncology fellow

## 2018-11-21 NOTE — PROGRESS NOTE ADULT - ASSESSMENT
52y/o HD#5 a/w heavy vaginal bleeding s/p UAE on HD#1 for stage IIIB cervical cancer in stable condition. Hospital course has been complicated by acute blood loss anemia s/p 5 uPRBCs. Clinical condition improving. 54y/o HD#5 a/w heavy vaginal bleeding s/p UAE on HD#1 for stage IIIB cervical cancer. Hospital course has been complicated by acute blood loss anemia s/p 5 uPRBCs. Started inpatient XRT on HD#4 (11/20/18). Clinical condition improving.

## 2018-11-21 NOTE — PROGRESS NOTE ADULT - PROBLEM SELECTOR PROBLEM 1
Malignant neoplasm of cervix, unspecified site
Vaginal bleeding

## 2018-11-21 NOTE — PROGRESS NOTE ADULT - PROBLEM SELECTOR PLAN 1
Neuro: c/w po analgesia PRN  CV: Hemodynamically stable  Pulm: Saturating well on room air, encourage incentive spirometry  GI: regular diet  : UOP adequate, d/c hairston and vaginal packing as bleeding has improved, continue radiation therapy for treatement of cervical cancer  Heme: c/w lovenox and SCDs for DVT ppx  Endo: ISS for glucose control    BABAR [facility] pgy2 Neuro: c/w po analgesia PRN  CV: Hemodynamically stable  Pulm: Saturating well on room air, encourage incentive spirometry  GI: regular diet  : UOP adequate, follow up void, strict pad counts, continue radiation therapy for treatement of cervical cancer  Heme: c/w lovenox and SCDs for DVT ppx  Endo: ISS for glucose control    BABAR [facility] pgy2

## 2018-11-21 NOTE — PROGRESS NOTE ADULT - SUBJECTIVE AND OBJECTIVE BOX
Oncology Follow-up    INTERVAL HPI/OVERNIGHT EVENTS:  Patient S&E at bedside. NO more vaginal bleeding.     VITAL SIGNS:  T(F): 97.9 (11-21-18 @ 09:26)  HR: 78 (11-21-18 @ 09:26)  BP: 100/50 (11-21-18 @ 09:26)  RR: 18 (11-21-18 @ 09:26)  SpO2: 99% (11-21-18 @ 09:26)  Wt(kg): --    PHYSICAL EXAM:    Constitutional: AAOx3, NAD,   Eyes: PERRL, EOMI, sclera non-icteric  Neck: supple, no masses, no JVD  Respiratory: CTA b/l, good air entry b/l, no wheezing, rhonchi, rales, with normal respiratory effort and no intercostal retractions  Cardiovascular: RRR, normal S1S2, no M/R/G  Gastrointestinal: soft, NTND, no masses palpable, BS normal in all four quadrants, no HSM  Extremities:  no c/c/e  Neurological: Grossly intact  Skin: Normal temperature    MEDICATIONS  (STANDING):  docusate sodium 100 milliGRAM(s) Oral two times a day  enoxaparin Injectable 40 milliGRAM(s) SubCutaneous daily  insulin lispro (HumaLOG) corrective regimen sliding scale   SubCutaneous three times a day before meals  insulin lispro (HumaLOG) corrective regimen sliding scale   SubCutaneous at bedtime  potassium chloride    Tablet ER 20 milliEquivalent(s) Oral once  senna 2 Tablet(s) Oral at bedtime    MEDICATIONS  (PRN):  acetaminophen   Tablet .. 650 milliGRAM(s) Oral every 6 hours PRN Mild Pain (1 - 3)  ibuprofen  Tablet. 600 milliGRAM(s) Oral every 6 hours PRN Moderate Pain (4 - 6)  metoclopramide Injectable 10 milliGRAM(s) IV Push every 8 hours PRN Nausea  oxyCODONE    IR 5 milliGRAM(s) Oral every 6 hours PRN Severe Pain (7 - 10)      amoxicillin (Unknown)      LABS:                        9.2    9.11  )-----------( 240      ( 21 Nov 2018 05:49 )             27.9     11-21    141  |  105  |  8   ----------------------------<  97  3.7   |  24  |  0.42<L>    Ca    8.5      21 Nov 2018 05:49  Phos  4.1     11-21  Mg     2.2     11-21             RADIOLOGY & ADDITIONAL TESTS:  Studies reviewed.

## 2018-11-22 VITALS
DIASTOLIC BLOOD PRESSURE: 49 MMHG | SYSTOLIC BLOOD PRESSURE: 110 MMHG | HEART RATE: 71 BPM | OXYGEN SATURATION: 99 % | RESPIRATION RATE: 16 BRPM | TEMPERATURE: 98 F

## 2018-11-22 LAB
BUN SERPL-MCNC: 9 MG/DL — SIGNIFICANT CHANGE UP (ref 7–23)
CALCIUM SERPL-MCNC: 8.7 MG/DL — SIGNIFICANT CHANGE UP (ref 8.4–10.5)
CHLORIDE SERPL-SCNC: 109 MMOL/L — HIGH (ref 98–107)
CO2 SERPL-SCNC: 21 MMOL/L — LOW (ref 22–31)
CREAT SERPL-MCNC: 0.4 MG/DL — LOW (ref 0.5–1.3)
GLUCOSE BLDC GLUCOMTR-MCNC: 122 MG/DL — HIGH (ref 70–99)
GLUCOSE BLDC GLUCOMTR-MCNC: 137 MG/DL — HIGH (ref 70–99)
GLUCOSE SERPL-MCNC: 131 MG/DL — HIGH (ref 70–99)
HCT VFR BLD CALC: 28.5 % — LOW (ref 34.5–45)
HGB BLD-MCNC: 9.3 G/DL — LOW (ref 11.5–15.5)
MAGNESIUM SERPL-MCNC: 2.5 MG/DL — SIGNIFICANT CHANGE UP (ref 1.6–2.6)
MCHC RBC-ENTMCNC: 28.4 PG — SIGNIFICANT CHANGE UP (ref 27–34)
MCHC RBC-ENTMCNC: 32.6 % — SIGNIFICANT CHANGE UP (ref 32–36)
MCV RBC AUTO: 87.2 FL — SIGNIFICANT CHANGE UP (ref 80–100)
NRBC # FLD: 0 — SIGNIFICANT CHANGE UP
PHOSPHATE SERPL-MCNC: 3.3 MG/DL — SIGNIFICANT CHANGE UP (ref 2.5–4.5)
PLATELET # BLD AUTO: 265 K/UL — SIGNIFICANT CHANGE UP (ref 150–400)
PMV BLD: 9.8 FL — SIGNIFICANT CHANGE UP (ref 7–13)
POTASSIUM SERPL-MCNC: 4.5 MMOL/L — SIGNIFICANT CHANGE UP (ref 3.5–5.3)
POTASSIUM SERPL-SCNC: 4.5 MMOL/L — SIGNIFICANT CHANGE UP (ref 3.5–5.3)
RBC # BLD: 3.27 M/UL — LOW (ref 3.8–5.2)
RBC # FLD: 14.3 % — SIGNIFICANT CHANGE UP (ref 10.3–14.5)
SODIUM SERPL-SCNC: 141 MMOL/L — SIGNIFICANT CHANGE UP (ref 135–145)
WBC # BLD: 9.69 K/UL — SIGNIFICANT CHANGE UP (ref 3.8–10.5)
WBC # FLD AUTO: 9.69 K/UL — SIGNIFICANT CHANGE UP (ref 3.8–10.5)

## 2018-11-22 PROCEDURE — 99232 SBSQ HOSP IP/OBS MODERATE 35: CPT

## 2018-11-22 RX ORDER — SENNA PLUS 8.6 MG/1
2 TABLET ORAL
Qty: 60 | Refills: 0 | OUTPATIENT
Start: 2018-11-22 | End: 2018-12-21

## 2018-11-22 RX ORDER — IBUPROFEN 200 MG
1 TABLET ORAL
Qty: 120 | Refills: 0 | OUTPATIENT
Start: 2018-11-22 | End: 2018-12-21

## 2018-11-22 RX ORDER — OXYCODONE HYDROCHLORIDE 5 MG/1
1 TABLET ORAL
Qty: 32 | Refills: 0 | OUTPATIENT
Start: 2018-11-22 | End: 2018-11-29

## 2018-11-22 RX ORDER — ACETAMINOPHEN 500 MG
2 TABLET ORAL
Qty: 240 | Refills: 0 | OUTPATIENT
Start: 2018-11-22 | End: 2018-12-21

## 2018-11-22 RX ORDER — DOCUSATE SODIUM 100 MG
1 CAPSULE ORAL
Qty: 30 | Refills: 0 | OUTPATIENT
Start: 2018-11-22 | End: 2018-12-06

## 2018-11-22 RX ADMIN — Medication 100 MILLIGRAM(S): at 05:44

## 2018-11-22 RX ADMIN — Medication 600 MILLIGRAM(S): at 11:47

## 2018-11-22 NOTE — PROGRESS NOTE ADULT - SUBJECTIVE AND OBJECTIVE BOX
PA GynOnc Progress Note POD #    Pt seen, examined at bedside and doing well meeting all post operative milestones. Pt states mild abdominal pain.  Pt denies fever, chills, chest pain, SOB, nausea, vomiting, lightheadedness, dizziness.  Pt states passing flatus, []BM    T(F): 97.7 (11-22-18 @ 05:42), Max: 98.4 (11-21-18 @ 17:43)  HR: 70 (11-22-18 @ 05:42) (67 - 80)  BP: 99/57 (11-22-18 @ 05:42) (97/53 - 113/70)  RR: 18 (11-22-18 @ 05:42) (16 - 18)  SpO2: 97% (11-22-18 @ 05:42) (97% - 100%)  Wt(kg): --  CAPILLARY BLOOD GLUCOSE    I&O's Detail    21 Nov 2018 07:01  -  22 Nov 2018 07:00  --------------------------------------------------------  IN:  Total IN: 0 mL    OUT:    Voided: 4650 mL  Total OUT: 4650 mL    Total NET: -4650 mL          MEDICATIONS  (STANDING):  docusate sodium 100 milliGRAM(s) Oral two times a day  enoxaparin Injectable 40 milliGRAM(s) SubCutaneous daily  insulin lispro (HumaLOG) corrective regimen sliding scale   SubCutaneous three times a day before meals  insulin lispro (HumaLOG) corrective regimen sliding scale   SubCutaneous at bedtime  senna 2 Tablet(s) Oral at bedtime    MEDICATIONS  (PRN):  acetaminophen   Tablet .. 650 milliGRAM(s) Oral every 6 hours PRN Mild Pain (1 - 3)  ibuprofen  Tablet. 600 milliGRAM(s) Oral every 6 hours PRN Moderate Pain (4 - 6)  metoclopramide Injectable 10 milliGRAM(s) IV Push every 8 hours PRN Nausea  oxyCODONE    IR 5 milliGRAM(s) Oral every 6 hours PRN Severe Pain (7 - 10)      Physical Exam:  Constitutional: WDWN female, NAD AxOx3  Skin: no breakdowns noted, warm and dry  Chest: s1s2+, RRR, clear to auscultation bilaterally, no w/r/r    Abdomen: softly distended, no guarding, no rebound, [] bowel sounds, appropriate tenderness noted   Incision site:   vertical incision clean and dry with []intact.    Extremities: no lower extremity edema or calf tenderness bilaterally; intermittent compression stockings in place     LABS:             9.3    9.69  )-----------( 265      ( 11-22 @ 03:20 )             28.5                9.2    9.11  )-----------( 240      ( 11-21 @ 05:49 )             27.9                9.5    10.75 )-----------( 210      ( 11-20 @ 12:01 )             28.3                9.1    11.34 )-----------( 176      ( 11-20 @ 04:10 )             27.2                9.4    x     )-----------( x        ( 11-20 @ 00:13 )             28.5                7.7    9.39  )-----------( 197      ( 11-19 @ 11:30 )             23.8       11-22    141    |  109<H>  |  9      ----------------------------<  131<H>  4.5     |  21<L>  |  0.40<L>  11-21    141    |  105    |  8      ----------------------------<  97     3.7     |  24     |  0.42<L>    Ca    8.7        22 Nov 2018 03:20  Ca    8.5        21 Nov 2018 05:49  Phos  3.3       11-22  Phos  4.1       11-21  Mg     2.5       11-22  Mg     2.2       11-21                RADIOLOGY & ADDITIONAL TESTS:    a/p: This 53y female, s/p     CV: hemodynamically stable, H/H []  PUL: adequate on RA  GI:   :   [] adequate output without dysuria  ID: afebrile, WBC stable  DVT prophylaxis:   Pain Management: controlled on []  d/w   []  on morning rounds  -encourage ambulation  -encourage incentive spirometry  -start d/c planning  continue with current care    AISHWARYA Slade  #[id number] [facility] Progress Note HD#6    Pt seen, examined at bedside and doing well. No acute overnight events.  Pt states she has not had any vaginal bleeding or any discomfort. Pt denies fever, chills, chest pain, SOB, nausea, vomiting, lightheadedness, dizziness.  Pt states passing flatus,    T(F): 97.7 (11-22-18 @ 05:42), Max: 98.4 (11-21-18 @ 17:43)  HR: 70 (11-22-18 @ 05:42) (67 - 80)  BP: 99/57 (11-22-18 @ 05:42) (97/53 - 113/70)  RR: 18 (11-22-18 @ 05:42) (16 - 18)  SpO2: 97% (11-22-18 @ 05:42) (97% - 100%)  Wt(kg): --  CAPILLARY BLOOD GLUCOSE    I&O's Detail    21 Nov 2018 07:01  -  22 Nov 2018 07:00  --------------------------------------------------------  IN:  Total IN: 0 mL    OUT:    Voided: 4650 mL  Total OUT: 4650 mL    Total NET: -4650 mL        MEDICATIONS  (STANDING):  docusate sodium 100 milliGRAM(s) Oral two times a day  enoxaparin Injectable 40 milliGRAM(s) SubCutaneous daily  insulin lispro (HumaLOG) corrective regimen sliding scale   SubCutaneous three times a day before meals  insulin lispro (HumaLOG) corrective regimen sliding scale   SubCutaneous at bedtime  senna 2 Tablet(s) Oral at bedtime    MEDICATIONS  (PRN):  acetaminophen   Tablet .. 650 milliGRAM(s) Oral every 6 hours PRN Mild Pain (1 - 3)  ibuprofen  Tablet. 600 milliGRAM(s) Oral every 6 hours PRN Moderate Pain (4 - 6)  metoclopramide Injectable 10 milliGRAM(s) IV Push every 8 hours PRN Nausea  oxyCODONE    IR 5 milliGRAM(s) Oral every 6 hours PRN Severe Pain (7 - 10)      Physical Exam:  Constitutional: WDWN female, NAD AxOx3  Skin: no breakdowns noted, warm and dry  Chest: s1s2+, RRR, clear to auscultation bilaterally, no w/r/r    Abdomen: softly distended, no guarding, no rebound, +bowel sounds, appropriate tenderness noted   Gyn:  no bleeding noted on pad this am, no clots overnight,   Extremities: no lower extremity edema or calf tenderness bilaterally; intermittent compression stockings in place     LABS:             9.3    9.69  )-----------( 265      ( 11-22 @ 03:20 )             28.5                9.2    9.11  )-----------( 240      ( 11-21 @ 05:49 )             27.9                9.5    10.75 )-----------( 210      ( 11-20 @ 12:01 )             28.3                9.1    11.34 )-----------( 176      ( 11-20 @ 04:10 )             27.2                9.4    x     )-----------( x        ( 11-20 @ 00:13 )             28.5                7.7    9.39  )-----------( 197      ( 11-19 @ 11:30 )             23.8       11-22    141    |  109<H>  |  9      ----------------------------<  131<H>  4.5     |  21<L>  |  0.40<L>  11-21    141    |  105    |  8      ----------------------------<  97     3.7     |  24     |  0.42<L>    Ca    8.7        22 Nov 2018 03:20  Ca    8.5        21 Nov 2018 05:49  Phos  3.3       11-22  Phos  4.1       11-21  Mg     2.5       11-22  Mg     2.2       11-21      a/p: This 53y female a/w heavy vaginal bleeding s/p UAE on HD#1 for stage IIIB cervical cancer. Hospital course has been complicated by acute blood loss anemia s/p 5 uPRBCs. Started inpatient XRT on HD#4 (11/20/18). Clinical condition improving.    CV: hemodynamically stable, H/H stable  PUL: adequate on RA  GI: tolerating regular diet, passing flatus, ISS coverage as needed  :  voiding w adequate output without dysuria  ID: afebrile, WBC stable  DVT prophylaxis: Lovenox  Pain Management: controlled on current regimen  d/w Dr. Saucedo on morning rounds  -encourage ambulation  -encourage incentive spirometry  -start d/c planning for d/c home today  -follow up outpatient with Rad Onc on Friday 1030a at [facility]  continue with current care    AISHWARYA Slade  #[id number]

## 2018-11-22 NOTE — PROGRESS NOTE ADULT - REASON FOR ADMISSION
Stage IIIB cervical cancer, vaginal bleeding

## 2018-11-22 NOTE — PROGRESS NOTE ADULT - ATTENDING COMMENTS
Patient seen and examined.   No further significant bleeding.   Stable to DC home today.   Will continue radiation with next treatment as an outpatient tomorrow.

## 2018-11-23 ENCOUNTER — OUTPATIENT (OUTPATIENT)
Dept: OUTPATIENT SERVICES | Facility: HOSPITAL | Age: 53
LOS: 1 days | Discharge: HOME | End: 2018-11-23
Payer: MEDICAID

## 2018-11-23 PROCEDURE — 77300 RADIATION THERAPY DOSE PLAN: CPT | Mod: 26

## 2018-11-23 PROCEDURE — 77338 DESIGN MLC DEVICE FOR IMRT: CPT | Mod: 26

## 2018-11-23 PROCEDURE — 77301 RADIOTHERAPY DOSE PLAN IMRT: CPT | Mod: 26

## 2018-11-26 ENCOUNTER — APPOINTMENT (OUTPATIENT)
Dept: GYNECOLOGIC ONCOLOGY | Facility: CLINIC | Age: 53
End: 2018-11-26
Payer: MEDICAID

## 2018-11-26 VITALS
DIASTOLIC BLOOD PRESSURE: 70 MMHG | SYSTOLIC BLOOD PRESSURE: 105 MMHG | BODY MASS INDEX: 26.43 KG/M2 | WEIGHT: 140 LBS | HEIGHT: 61 IN

## 2018-11-26 PROCEDURE — 99214 OFFICE O/P EST MOD 30 MIN: CPT

## 2018-11-27 ENCOUNTER — INBOUND DOCUMENT (OUTPATIENT)
Age: 53
End: 2018-11-27

## 2018-11-27 DIAGNOSIS — Z76.89 PERSONS ENCOUNTERING HEALTH SERVICES IN OTHER SPECIFIED CIRCUMSTANCES: ICD-10-CM

## 2018-11-28 PROCEDURE — G6002: CPT | Mod: 26

## 2018-11-29 PROCEDURE — G6002: CPT | Mod: 26

## 2018-11-30 VITALS
HEIGHT: 61 IN | OXYGEN SATURATION: 98 % | HEART RATE: 91 BPM | RESPIRATION RATE: 16 BRPM | WEIGHT: 141.1 LBS | DIASTOLIC BLOOD PRESSURE: 74 MMHG | TEMPERATURE: 87.8 F | BODY MASS INDEX: 26.64 KG/M2 | SYSTOLIC BLOOD PRESSURE: 118 MMHG

## 2018-11-30 PROCEDURE — 77427 RADIATION TX MANAGEMENT X5: CPT

## 2018-11-30 PROCEDURE — G6002: CPT | Mod: 26

## 2018-12-03 ENCOUNTER — RESULT REVIEW (OUTPATIENT)
Age: 53
End: 2018-12-03

## 2018-12-03 ENCOUNTER — APPOINTMENT (OUTPATIENT)
Dept: INFUSION THERAPY | Facility: HOSPITAL | Age: 53
End: 2018-12-03

## 2018-12-03 VITALS
DIASTOLIC BLOOD PRESSURE: 74 MMHG | SYSTOLIC BLOOD PRESSURE: 121 MMHG | OXYGEN SATURATION: 97 % | HEIGHT: 61 IN | RESPIRATION RATE: 16 BRPM | BODY MASS INDEX: 26.83 KG/M2 | TEMPERATURE: 98.4 F | HEART RATE: 85 BPM | WEIGHT: 142.09 LBS

## 2018-12-03 LAB
HCT VFR BLD CALC: 30.9 % — LOW (ref 34.5–45)
HGB BLD-MCNC: 10.3 G/DL — LOW (ref 11.5–15.5)
MCHC RBC-ENTMCNC: 27.8 PG — SIGNIFICANT CHANGE UP (ref 27–34)
MCHC RBC-ENTMCNC: 33.3 G/DL — SIGNIFICANT CHANGE UP (ref 32–36)
MCV RBC AUTO: 83.4 FL — SIGNIFICANT CHANGE UP (ref 80–100)
PLATELET # BLD AUTO: 239 K/UL — SIGNIFICANT CHANGE UP (ref 150–400)
RBC # BLD: 3.71 M/UL — LOW (ref 3.8–5.2)
RBC # FLD: 14.6 % — HIGH (ref 10.3–14.5)
WBC # BLD: 6.6 K/UL — SIGNIFICANT CHANGE UP (ref 3.8–10.5)
WBC # FLD AUTO: 6.6 K/UL — SIGNIFICANT CHANGE UP (ref 3.8–10.5)

## 2018-12-03 PROCEDURE — G6002: CPT | Mod: 26

## 2018-12-03 PROCEDURE — 93010 ELECTROCARDIOGRAM REPORT: CPT

## 2018-12-04 ENCOUNTER — APPOINTMENT (OUTPATIENT)
Dept: GYNECOLOGIC ONCOLOGY | Facility: CLINIC | Age: 53
End: 2018-12-04
Payer: MEDICAID

## 2018-12-04 ENCOUNTER — OUTPATIENT (OUTPATIENT)
Dept: OUTPATIENT SERVICES | Facility: HOSPITAL | Age: 53
LOS: 1 days | Discharge: HOME | End: 2018-12-04

## 2018-12-04 ENCOUNTER — OTHER (OUTPATIENT)
Age: 53
End: 2018-12-04

## 2018-12-04 VITALS
WEIGHT: 138 LBS | HEIGHT: 61 IN | DIASTOLIC BLOOD PRESSURE: 80 MMHG | BODY MASS INDEX: 26.06 KG/M2 | SYSTOLIC BLOOD PRESSURE: 130 MMHG

## 2018-12-04 DIAGNOSIS — C53.9 MALIGNANT NEOPLASM OF CERVIX UTERI, UNSPECIFIED: ICD-10-CM

## 2018-12-04 DIAGNOSIS — Z51.11 ENCOUNTER FOR ANTINEOPLASTIC CHEMOTHERAPY: ICD-10-CM

## 2018-12-04 DIAGNOSIS — R11.2 NAUSEA WITH VOMITING, UNSPECIFIED: ICD-10-CM

## 2018-12-04 DIAGNOSIS — D50.9 IRON DEFICIENCY ANEMIA, UNSPECIFIED: ICD-10-CM

## 2018-12-04 LAB
ALBUMIN SERPL ELPH-MCNC: 4.2 G/DL — SIGNIFICANT CHANGE UP (ref 3.3–5)
ALP SERPL-CCNC: 117 U/L — SIGNIFICANT CHANGE UP (ref 30–120)
ALT FLD-CCNC: 18 U/L — SIGNIFICANT CHANGE UP (ref 10–45)
ANION GAP SERPL CALC-SCNC: 12 MMOL/L — SIGNIFICANT CHANGE UP (ref 5–17)
AST SERPL-CCNC: 18 U/L — SIGNIFICANT CHANGE UP (ref 10–40)
BILIRUB SERPL-MCNC: 0.2 MG/DL — SIGNIFICANT CHANGE UP (ref 0.2–1.2)
BUN SERPL-MCNC: 5 MG/DL — LOW (ref 7–23)
CALCIUM SERPL-MCNC: 9.3 MG/DL — SIGNIFICANT CHANGE UP (ref 8.4–10.5)
CHLORIDE SERPL-SCNC: 102 MMOL/L — SIGNIFICANT CHANGE UP (ref 96–108)
CO2 SERPL-SCNC: 26 MMOL/L — SIGNIFICANT CHANGE UP (ref 22–31)
CREAT SERPL-MCNC: 0.47 MG/DL — LOW (ref 0.5–1.3)
GLUCOSE SERPL-MCNC: 96 MG/DL — SIGNIFICANT CHANGE UP (ref 70–99)
MAGNESIUM SERPL-MCNC: 1.8 MG/DL — SIGNIFICANT CHANGE UP (ref 1.6–2.6)
POTASSIUM SERPL-MCNC: 4.1 MMOL/L — SIGNIFICANT CHANGE UP (ref 3.5–5.3)
POTASSIUM SERPL-SCNC: 4.1 MMOL/L — SIGNIFICANT CHANGE UP (ref 3.5–5.3)
PROT SERPL-MCNC: 7.6 G/DL — SIGNIFICANT CHANGE UP (ref 6–8.3)
SODIUM SERPL-SCNC: 140 MMOL/L — SIGNIFICANT CHANGE UP (ref 135–145)

## 2018-12-04 PROCEDURE — G6002: CPT | Mod: 26

## 2018-12-04 PROCEDURE — 99213 OFFICE O/P EST LOW 20 MIN: CPT

## 2018-12-05 ENCOUNTER — OTHER (OUTPATIENT)
Age: 53
End: 2018-12-05

## 2018-12-05 PROCEDURE — G6002: CPT | Mod: 26

## 2018-12-06 ENCOUNTER — APPOINTMENT (OUTPATIENT)
Age: 53
End: 2018-12-06

## 2018-12-06 VITALS
DIASTOLIC BLOOD PRESSURE: 67 MMHG | BODY MASS INDEX: 26.08 KG/M2 | RESPIRATION RATE: 16 BRPM | WEIGHT: 138.01 LBS | HEART RATE: 77 BPM | OXYGEN SATURATION: 98 % | SYSTOLIC BLOOD PRESSURE: 102 MMHG | TEMPERATURE: 98 F

## 2018-12-06 DIAGNOSIS — D64.9 ANEMIA, UNSPECIFIED: ICD-10-CM

## 2018-12-06 PROCEDURE — G6002: CPT | Mod: 26

## 2018-12-06 RX ORDER — IBUPROFEN 600 MG/1
600 TABLET ORAL
Qty: 120 | Refills: 0 | Status: COMPLETED | COMMUNITY
Start: 2018-11-23 | End: 2018-12-06

## 2018-12-06 RX ORDER — FLUTICASONE PROPIONATE 50 UG/1
50 SPRAY, METERED NASAL
Qty: 16 | Refills: 0 | Status: ACTIVE | COMMUNITY
Start: 2018-12-01

## 2018-12-06 RX ORDER — ISOPRPOPYL ALCOHOL 70 ML/100ML
70 SWAB TOPICAL
Qty: 100 | Refills: 0 | Status: ACTIVE | COMMUNITY
Start: 2018-10-18

## 2018-12-06 RX ORDER — NAPROXEN 375 MG/1
375 TABLET ORAL
Qty: 30 | Refills: 0 | Status: COMPLETED | COMMUNITY
Start: 2018-11-03 | End: 2018-12-06

## 2018-12-06 RX ORDER — LANCETS 30 GAUGE
EACH MISCELLANEOUS
Qty: 100 | Refills: 0 | Status: ACTIVE | COMMUNITY
Start: 2018-10-18

## 2018-12-07 PROCEDURE — G6002: CPT | Mod: 26

## 2018-12-08 PROBLEM — D64.9 ANEMIA: Status: ACTIVE | Noted: 2018-11-13

## 2018-12-08 NOTE — ASSESSMENT
[FreeTextEntry1] : Ms. Leggett is a 54 y/o F with h/o DM on Metformin, who was doing well until 8/2018 when patient had menorrhagia. She had continued bleeding, blood clot, abdominal pain and pelvic cramping with pressure until end of 9/2018 for which she presented to the [facility] ED in [place]. Patient states she had USG abdomen which showed mass, after which she was referred to Gynecologist, who referred her to Dr. Carter for further evaluation. Pathology of "tissue from cervix" dated 10/12/18 showed squamous cell carcinoma. On exam by Dr. Carter on 10/18/18, there was a 6 cm firm and friable cervix with possible vaginal involvement at 5:00 and L USL replaced by tumor extending to the side wall, consistent with at least clinical stage IIIB disease. Dr. Carter referred patient to rad/onc for management. Patient was evaluated by Dr. Mcmahan and referred her to medical oncology. Patient had a PET scan which showed no evidence of metastatic disease. \par Patient was planned to start treatment, however was admitted to the hospital with massive per vaginal bleeding, 11/17-11/22/18. She had IR guided embolization, with continued bleeding due to which was started on inpatient radiation on 11/20/18 and Cisplatin on 11/21/18.\par  \par 1. Cervical Cancer:\par -Patient with clinical Stage IIIB with left sided pelvic USL extension. PET scan showed no evidence of metastatic disease.\par -Will continue with concurrent chemo/XRT. Radiation per Dr. Mcmahan. \par -Recommended to use anti-emetics for nausea. Will schedule for IV fluids mid cycle due to fatigue and low appetite. \par -Continue to f/u Dr. Farrell. \par -Dietary counseling. \par -Tylenol as needed for pain. \par -Plan discussed and questions answered per their apparent satisfaction. \par \par 2. Iron deficiency anemia:\par \par -Likely iron deficiency from blood loss. Will get anemia w/u. Start on ferrous sulphate with ascorbic acid.\par -Will plan for IV iron X 2 doses with chemotherapy.\par -Consent obtained.\par \par F/U in 2 week after treatment.\par \par D/W Dr. Aleman following longitudinally with Dr. Menendez.

## 2018-12-08 NOTE — RESULTS/DATA
[FreeTextEntry1] : XAM:  PETCT SKUL-THI ONC FDG INIT  \par \par \par PROCEDURE DATE:  11/07/2018  \par  \par \par \par INTERPRETATION:  PROCEDURE:  PET/CT SKULL BASE-MID THIGH IMAGING \par \par CLINICAL INFORMATION: 53-year-old female with newly diagnosed cervical \par squamous cell cancer. Evaluate for metastatic disease. PET/CT is done as \par part of the initial treatment strategy evaluation.\par \par RADIOPHARMACEUTICAL:  9.96 mCi F-18, FDG, I.V.\par \par TECHNIQUE:  Fasting blood sugar measured prior to injection of \par radiopharmaceutical was 92 mg/dl. Following intravenous injection of the \par radiopharmaceutical and an uptake period of approximately 55 minutes, \par FDG-PET/CT was obtained on a [employer] 710 from skull base to mid \par thigh. Oral contrast was given during the uptake period. CT was performed \par during shallow respiration. The CT protocol was optimized for PET \par attenuation correction and anatomic localization. The CT protocol was not \par designed to produce and cannot replace state-of-the-art diagnostic CT \par images with specific imaging protocols for different body parts and \par indications. Images were reviewed on a dedicated workstation using \par multiplanar reconstruction.\par \par The standardized uptake values (SUV) are normalized to patient body \par weight and indicate the highest activity concentration (SUVmax) in a \par given disease site. All image numbers refer to axial image number.\par \par COMPARISON:  No prior FDG-PET/CT is available.\par \par OTHER STUDIES USED FOR CORRELATION: None available.\par \par FINDINGS: \par \par HEAD/NECK: Physiologic FDG activity in visualized brain, head, and neck.  \par \par CHEST: No abnormal FDG activity. No lymphadenopathy.     \par \par LUNGS: No abnormal FDG activity. No lung nodule. \par \par PLEURA/PERICARDIUM: No abnormal FDG activity. No effusion.    \par \par HEPATOBILIARY/PANCREAS:  Physiologic FDG activity. Liver SUV mean is 2.1.\par \par SPLEEN: Diffuse, mild probably increased FDG activity in the spleen which \par is more intense than hepatic uptake of FDG (SUV max 4.1; SUV mean 3.1). \par Normal in size.    \par \par ADRENAL GLANDS: No abnormal FDG activity. No nodule.\par \par KIDNEYS/URINARY BLADDER: Physiologic excreted FDG activity.\par \par REPRODUCTIVE ORGANS: A hypermetabolic mass in the uterine cervix measures \par approximately 5.5 cm in maximum transverse diameter (SUV 20.2; image \par 194). Hypermetabolism extends into the lower uterine segment which is not \par well evaluated on CT in the absence of intravenous contrast.\par \par ABDOMINOPELVIC NODES: No enlarged or FDG-avid lymph node.    \par \par BOWEL/PERITONEUM/MESENTERY: Pancolonic hypermetabolism, without \par corresponding abnormality on CT, probably is secondary to metformin.\par \par BONES/SOFT TISSUES: Mild, diffuse bone marrow hypermetabolism in the \par axial and proximal appendicular skeleton, without corresponding \par abnormalities on CT. For reference, L4 demonstrates SUV 4.8 (image 161).\par \par IMPRESSION: Abnormal FDG-PET/CT scan.\par \par 1. Hypermetabolic cervical mass with extension of hypermetabolism into \par lower uterine segment, corresponds to known cervical carcinoma. Pelvic \par MRI may be obtained to further delineate the lesion.\par \par 2. Nonspecific diffuse splenic and bone marrow hypermetabolism may be \par secondary to anemia.\par \par 3. Nonspecific pancolonic hypermetabolism may be secondary to metformin \par therapy.\par \par 4. No evidence of metastatic disease.\par \par ZAKI PELAEZ M.D., NUCLEAR MEDICINE ATTENDING \par This document has been electronically signed. Nov 8 2018 10:58AM\par   \par \par   \par

## 2018-12-08 NOTE — REVIEW OF SYSTEMS
[Fatigue] : fatigue [Recent Change In Weight] : ~T recent weight change [Abdominal Pain] : abdominal pain [Negative] : Allergic/Immunologic [Fever] : no fever [Chills] : no chills [Night Sweats] : no night sweats [Chest Pain] : no chest pain [Palpitations] : no palpitations [Leg Claudication] : no intermittent leg claudication [Lower Ext Edema] : no lower extremity edema [Shortness Of Breath] : no shortness of breath [Wheezing] : no wheezing [Cough] : no cough [SOB on Exertion] : no shortness of breath during exertion [Vomiting] : no vomiting [Constipation] : no constipation [Diarrhea] : no diarrhea [Dysuria] : no dysuria [Incontinence] : no incontinence [Vaginal Discharge] : no vaginal discharge [Dysmenorrhea/Abn Vaginal Bleeding] : no dysmenorrhea/abnormal vaginal bleeding [FreeTextEntry7] : pelvic cramps

## 2018-12-08 NOTE — HISTORY OF PRESENT ILLNESS
[Disease: _____________________] : Disease: [unfilled] [T: ___] : T[unfilled] [N: ___] : N[unfilled] [M: ___] : M[unfilled] [AJCC Stage: ____] : AJCC Stage: [unfilled] [Treatment Protocol] : Treatment Protocol [de-identified] : Gyn: Dr. Emilia Carter/ Dr. Farrell\par Radiation: Dr. Yvonne Mcmahan\par \par History taken from patient. Translated by son, Billy. Patient declined [facility] . \par \par Patient has h/o DM on Metformin, who was doing well until 8/2018 when patient had menorrhagia. She had continued bleeding, blood clot, abdominal pain and pelvic cramping with pressure until end of 9/2018 for which she presented to the [facility] ED in [place]. Patient states she had USG abdomen which showed mass, after which she was referred to Gynecologist who noted a friable cervix on examination s/p biopsy, who referred her to Dr. Carter for further evaluation. Pathology of "tissue from cervix" dated 10/12/18 showed squamous cell carcinoma. On exam by Dr. Carter on 10/18/18, there was a 6 cm firm and friable cervix with minute cervical rim at vaginal margin with possible vaginal involvement at 5:00 and L USL replaced by tumor extending to the side wall/ R USL firm, fixed and smooth, consistent with at least clinical stage IIIB disease. Dr. Carter referred patient to rad/onc for management. Patient was evaluated by Dr. Mcmahan and referred her to medical oncology. She had PET scan done on 1/7/18 with no evidence of metastatic disease.  Patient was planned to start treatment, however was admitted to [facility] with per vaginal bleeding and clots from 11/17-11/22/18.  She had IR embolization, but continued to have bleeding and hence was started on inpatient radiation on 11/20/18. She received C1 Cisplatin in house on 11/21/18. [de-identified] : Squamous cell carcinoma [FreeTextEntry1] : concurrent chemo/xrt\par C1: Cisplatin 11/21/18 [de-identified] : Patient is doing ok. She states she is fatigued and tired. She continues to have pelvic pain and cramps, for which she takes Tylenol which helps with her pain. Bleeding has stopped. She continues to have decreased appetite. She was nauseous after Cisplatin, but didn’t take anti-emetics. No vomiting. \par Bowel and bladder habits are normal. \par

## 2018-12-10 ENCOUNTER — LABORATORY RESULT (OUTPATIENT)
Age: 53
End: 2018-12-10

## 2018-12-10 ENCOUNTER — RESULT REVIEW (OUTPATIENT)
Age: 53
End: 2018-12-10

## 2018-12-10 ENCOUNTER — APPOINTMENT (OUTPATIENT)
Dept: INFUSION THERAPY | Facility: HOSPITAL | Age: 53
End: 2018-12-10

## 2018-12-10 VITALS
HEIGHT: 61 IN | WEIGHT: 136.46 LBS | TEMPERATURE: 97.8 F | BODY MASS INDEX: 25.76 KG/M2 | DIASTOLIC BLOOD PRESSURE: 69 MMHG | OXYGEN SATURATION: 99 % | SYSTOLIC BLOOD PRESSURE: 111 MMHG | HEART RATE: 84 BPM | RESPIRATION RATE: 16 BRPM

## 2018-12-10 LAB
HCT VFR BLD CALC: 32 % — LOW (ref 34.5–45)
HGB BLD-MCNC: 11.2 G/DL — LOW (ref 11.5–15.5)
MCHC RBC-ENTMCNC: 29 PG — SIGNIFICANT CHANGE UP (ref 27–34)
MCHC RBC-ENTMCNC: 34.9 G/DL — SIGNIFICANT CHANGE UP (ref 32–36)
MCV RBC AUTO: 83 FL — SIGNIFICANT CHANGE UP (ref 80–100)
PLATELET # BLD AUTO: 171 K/UL — SIGNIFICANT CHANGE UP (ref 150–400)
RBC # BLD: 3.85 M/UL — SIGNIFICANT CHANGE UP (ref 3.8–5.2)
RBC # FLD: 15.6 % — HIGH (ref 10.3–14.5)
WBC # BLD: 5.2 K/UL — SIGNIFICANT CHANGE UP (ref 3.8–10.5)
WBC # FLD AUTO: 5.2 K/UL — SIGNIFICANT CHANGE UP (ref 3.8–10.5)

## 2018-12-10 PROCEDURE — G6002: CPT | Mod: 26

## 2018-12-10 PROCEDURE — 77427 RADIATION TX MANAGEMENT X5: CPT

## 2018-12-10 NOTE — REVIEW OF SYSTEMS
[Genital Edema: Grade 0] : Genital Edema: Grade 0 [Vaginal Stricture: Grade 0] : Vaginal Stricture: Grade 0 [Vaginal Infection: Grade 0] : Vaginal Infection: Grade 0  [Breast Pain: Grade 0] : Breast Pain: Grade 0 [Constipation: Grade 0] : Constipation: Grade 0 [Diarrhea: Grade 1 - Increase of <4 stools per day over baseline; mild increase in ostomy output compared to baseline] : Diarrhea: Grade 1 - Increase of <4 stools per day over baseline; mild increase in ostomy output compared to baseline [Nausea: Grade 0] : Nausea: Grade 0 [Rectal Pain: Grade 0] : Rectal Pain: Grade 0 [Hematuria: Grade 0] : Hematuria: Grade 0 [Urinary Urgency: Grade 0] : Urinary Urgency: Grade 0 [Urinary Frequency: Grade 0] : Urinary Frequency: Grade 0 [Dermatitis Radiation: Grade 0] : Dermatitis Radiation: Grade 0

## 2018-12-10 NOTE — HISTORY OF PRESENT ILLNESS
[Disease: _____________________] : Disease: [unfilled] [T: ___] : T[unfilled] [N: ___] : N[unfilled] [M: ___] : M[unfilled] [AJCC Stage: ____] : AJCC Stage: [unfilled] [de-identified] : Gyn: Dr. Emilia Carter\par Radiation: Dr. Yvonne Mcmahan\par \par History taken from patient. Translate by son, Billy. Patient declined [facility] . \par \par Patient has h/o DM on Metformin, who was doing well until 8/2018 when patient had menorrhagia. She had continued bleeding, blood clot, abdominal pain and pelvic cramping with pressure until end of 9/2018 for which she presented to the [facility] ED in [place]. Patient states she had USG abdomen which showed mass, after which she was referred to Gynecologist who noted a friable cervix on examination s/p biopsy, who referred her to Dr. Carter for further evaluation. Pathology of "tissue from cervix" dated 10/12/18 showed squamous cell carcinoma. On exam by Dr. Carter on 10/18/18, there was a 6 cm firm and friable cervix with minute cervical rim at vaginal margin with possible vaginal involvement at 5:00 and L USL replaced by tumor extending to the side wall/ R USL firm, fixed and smooth, consistent with at least clinical stage IIIB disease. Dr. Carter referred patient to rad/onc for management. Patient was evaluated by Dr. Mcmahan and referred her to medical oncology.  [de-identified] : Squamous cell carcinoma [de-identified] : Patient continues to have pelvic pain and cramps, for which she takes Tylenol which helps with her pain. She also continues to have intermittent vaginal bleeding. \par She has decreased appetite and has unintentional weight loss of 8-10 lbs in 3 months. She also complaints of fatigue but is able to carry out ADL/IDL activities. \par Bowel and bladder habits are normal. \par

## 2018-12-10 NOTE — ASSESSMENT
[FreeTextEntry1] : Ms. Leggett is a 52 y/o F with h/o DM on Metformin, who was doing well until 8/2018 when patient had menorrhagia. She had continued bleeding, blood clot, abdominal pain and pelvic cramping with pressure until end of 9/2018 for which she presented to the [facility] ED in [place]. Patient states she had USG abdomen which showed mass, after which she was referred to Gynecologist, who referred her to Dr. Carter for further evaluation. Pathology of "tissue from cervix" dated 10/12/18 showed squamous cell carcinoma. On exam by Dr. Carter on 10/18/18, there was a 6 cm firm and friable cervix with possible vaginal involvement at 5:00 and L USL replaced by tumor extending to the side wall, consistent with at least clinical stage IIIB disease. Dr. Carter referred patient to rad/onc for management. Patient was evaluated by Dr. Mcmahan and referred her to medical oncology. \par \par 1. Cervical Cancer:\par -Atleast clinical Stage IIIB with left sided pelvic USL extension. Will get PET scan to evaluate for metastatic disease. D/W patient and son, the pathology findings and plan for concurrent chemoradiation should this be a localized disease. Will await the result of PET scan before final decision. Pathology slide has been requested.\par -Continue to follow with Dr. Mcmahan.\par -Continue to follow with Gyn/Onc, patient wants to transfer all her cancer related care at [facility], will refer to gyn/onc, information provided. \par -CBC, CMP, Hepatitis, HIV today.\par -Dietary counseling. \par -Tylenol as needed for pain. Stop Naproxen.\par \par F/U in 1 week with result of PET scan. \par \par D/W Dr. Amaro, following longitudnally with Dr. Menendez.

## 2018-12-11 DIAGNOSIS — E86.0 DEHYDRATION: ICD-10-CM

## 2018-12-11 DIAGNOSIS — R11.2 NAUSEA WITH VOMITING, UNSPECIFIED: ICD-10-CM

## 2018-12-11 DIAGNOSIS — Z51.11 ENCOUNTER FOR ANTINEOPLASTIC CHEMOTHERAPY: ICD-10-CM

## 2018-12-11 PROCEDURE — G6002: CPT | Mod: 26

## 2018-12-11 PROCEDURE — 77427 RADIATION TX MANAGEMENT X5: CPT

## 2018-12-12 PROCEDURE — G6002: CPT | Mod: 26

## 2018-12-13 ENCOUNTER — OUTPATIENT (OUTPATIENT)
Dept: OUTPATIENT SERVICES | Facility: HOSPITAL | Age: 53
LOS: 1 days | End: 2018-12-13
Payer: MEDICAID

## 2018-12-13 VITALS
HEIGHT: 59 IN | HEART RATE: 97 BPM | OXYGEN SATURATION: 99 % | DIASTOLIC BLOOD PRESSURE: 74 MMHG | TEMPERATURE: 99 F | WEIGHT: 136.03 LBS | RESPIRATION RATE: 14 BRPM | SYSTOLIC BLOOD PRESSURE: 102 MMHG

## 2018-12-13 DIAGNOSIS — Z90.49 ACQUIRED ABSENCE OF OTHER SPECIFIED PARTS OF DIGESTIVE TRACT: Chronic | ICD-10-CM

## 2018-12-13 DIAGNOSIS — E11.9 TYPE 2 DIABETES MELLITUS WITHOUT COMPLICATIONS: ICD-10-CM

## 2018-12-13 DIAGNOSIS — C53.9 MALIGNANT NEOPLASM OF CERVIX UTERI, UNSPECIFIED: ICD-10-CM

## 2018-12-13 LAB
ALBUMIN SERPL ELPH-MCNC: 4.2 G/DL — SIGNIFICANT CHANGE UP (ref 3.3–5)
ALP SERPL-CCNC: 98 U/L — SIGNIFICANT CHANGE UP (ref 40–120)
ALT FLD-CCNC: 22 U/L — SIGNIFICANT CHANGE UP (ref 4–33)
APPEARANCE UR: CLEAR — SIGNIFICANT CHANGE UP
AST SERPL-CCNC: 20 U/L — SIGNIFICANT CHANGE UP (ref 4–32)
BACTERIA # UR AUTO: NEGATIVE — SIGNIFICANT CHANGE UP
BILIRUB SERPL-MCNC: 0.2 MG/DL — SIGNIFICANT CHANGE UP (ref 0.2–1.2)
BILIRUB UR-MCNC: NEGATIVE — SIGNIFICANT CHANGE UP
BLOOD UR QL VISUAL: SIGNIFICANT CHANGE UP
BUN SERPL-MCNC: 5 MG/DL — LOW (ref 7–23)
CALCIUM SERPL-MCNC: 9.5 MG/DL — SIGNIFICANT CHANGE UP (ref 8.4–10.5)
CHLORIDE SERPL-SCNC: 96 MMOL/L — LOW (ref 98–107)
CO2 SERPL-SCNC: 28 MMOL/L — SIGNIFICANT CHANGE UP (ref 22–31)
COLOR SPEC: COLORLESS — SIGNIFICANT CHANGE UP
CREAT SERPL-MCNC: 0.57 MG/DL — SIGNIFICANT CHANGE UP (ref 0.5–1.3)
GLUCOSE SERPL-MCNC: 89 MG/DL — SIGNIFICANT CHANGE UP (ref 70–99)
GLUCOSE UR-MCNC: NEGATIVE — SIGNIFICANT CHANGE UP
HBA1C BLD-MCNC: 5 % — SIGNIFICANT CHANGE UP (ref 4–5.6)
HCT VFR BLD CALC: 33.2 % — LOW (ref 34.5–45)
HGB BLD-MCNC: 10.7 G/DL — LOW (ref 11.5–15.5)
HYALINE CASTS # UR AUTO: NEGATIVE — SIGNIFICANT CHANGE UP
KETONES UR-MCNC: NEGATIVE — SIGNIFICANT CHANGE UP
LEUKOCYTE ESTERASE UR-ACNC: NEGATIVE — SIGNIFICANT CHANGE UP
MCHC RBC-ENTMCNC: 28.5 PG — SIGNIFICANT CHANGE UP (ref 27–34)
MCHC RBC-ENTMCNC: 32.2 % — SIGNIFICANT CHANGE UP (ref 32–36)
MCV RBC AUTO: 88.3 FL — SIGNIFICANT CHANGE UP (ref 80–100)
NITRITE UR-MCNC: NEGATIVE — SIGNIFICANT CHANGE UP
NRBC # FLD: 0 — SIGNIFICANT CHANGE UP
PH UR: 6.5 — SIGNIFICANT CHANGE UP (ref 5–8)
PLATELET # BLD AUTO: 143 K/UL — LOW (ref 150–400)
PMV BLD: 9.1 FL — SIGNIFICANT CHANGE UP (ref 7–13)
POTASSIUM SERPL-MCNC: 3.4 MMOL/L — LOW (ref 3.5–5.3)
POTASSIUM SERPL-SCNC: 3.4 MMOL/L — LOW (ref 3.5–5.3)
PROT SERPL-MCNC: 8.4 G/DL — HIGH (ref 6–8.3)
PROT UR-MCNC: NEGATIVE — SIGNIFICANT CHANGE UP
RBC # BLD: 3.76 M/UL — LOW (ref 3.8–5.2)
RBC # FLD: 15.9 % — HIGH (ref 10.3–14.5)
RBC CASTS # UR COMP ASSIST: SIGNIFICANT CHANGE UP (ref 0–?)
SODIUM SERPL-SCNC: 137 MMOL/L — SIGNIFICANT CHANGE UP (ref 135–145)
SP GR SPEC: 1 — SIGNIFICANT CHANGE UP (ref 1–1.04)
SQUAMOUS # UR AUTO: SIGNIFICANT CHANGE UP
UROBILINOGEN FLD QL: NORMAL — SIGNIFICANT CHANGE UP
WBC # BLD: 3.74 K/UL — LOW (ref 3.8–10.5)
WBC # FLD AUTO: 3.74 K/UL — LOW (ref 3.8–10.5)
WBC UR QL: SIGNIFICANT CHANGE UP (ref 0–?)

## 2018-12-13 PROCEDURE — 93010 ELECTROCARDIOGRAM REPORT: CPT

## 2018-12-13 PROCEDURE — G6002: CPT | Mod: 26

## 2018-12-13 NOTE — DISEASE MANAGEMENT
[Clinical] : TNM Stage: c [IIIB] : IIIB [TTNM] : 3b [NTNM] : x [MTNM] : x [de-identified] : 1620 cGy [de-identified] : 3960 cGy [de-identified] : pelvis

## 2018-12-13 NOTE — H&P PST ADULT - PROBLEM SELECTOR PLAN 1
plan of care as above Pt scheduled for an examination under anesthesia, dilation of cervix, Talat sleeve placement with sono guidance, cystoscopy on 12/27/18. preop instructions provided including Pepcid, npo status. verbalized understanding.

## 2018-12-13 NOTE — H&P PST ADULT - HISTORY OF PRESENT ILLNESS
Patient is a 54 y/o  Female with PMH DM and recently diagnosed stage IIIB cervical cancer. States on 11/17/18 when she started having heavy vaginal bleeding with clots soaking through several pads an hour. bleeding continued and she began to experience lightheadedness, weakness and dizziness at which point her sons bought her to [facility] for evaluation. Pt evaluated, Blood transfusion x 6 given due to excessive blood loss. Patient underwent PET scan in preparation for Radiation therapy showing locally advanced disease with no metastasis and was planning to begin radiation therapy but due to episode of bleeding did in house (2 RT and 1 chemo) then continue outpatient (so far had chemo x 4 last done 1 wk ago and RT x14 last today 12/13/18). Preop dx: malignant neoplsm of cervix uteri, unspecified.   Pt now scheduled for an examination under anesthesia, dilation of cervix, Talat sleeve placement with sono guidance, cystoscopy on 12/27/18.

## 2018-12-13 NOTE — PHYSICAL EXAM
[Abdomen Soft] : soft [Abdomen Tenderness] : non-tender [] : no hepato-splenomegaly [de-identified] : Cervical mass visualized, smaller and softer compared to previous exams

## 2018-12-13 NOTE — H&P PST ADULT - VENOUS THROMBOEMBOLISM CURRENT STATUS
(1) other risk factor (includes escalating BMI, pack-years of smoking, diabetes requiring insulin, chemotherapy, female gender and length of surgery)/(2) malignancy (present or previous)

## 2018-12-13 NOTE — H&P PST ADULT - GENITOURINARY COMMENTS
DX: malignant neoplasm of cervix uteri, unspecified DX: malignant neoplasm of cervix uteri, unspecified.

## 2018-12-13 NOTE — DISEASE MANAGEMENT
[Clinical] : TNM Stage: c [IIIB] : IIIB [TTNM] : 3b [NTNM] : x [MTNM] : x [de-identified] : pelvis

## 2018-12-13 NOTE — HISTORY OF PRESENT ILLNESS
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 \par Patient with c/o occasional burning on urination. Has small little blood clots that fall from vagina after urination (vaginal, not with urine). No bleeding with urination. \par Had abdominal pain that resolved with ibuprofen.\par Has mild diarrhea, controlled. Lost 2 pounds. Has lots of gas.\par No fever, nausea, headache, dizziness. \par Will continue chemo at [facility].  To get cycle 3 today.\par Saw Dr. Farrell - arranged for Talat sleeve 12/27.  MRI to be done later that day after Talat sleeve.\par HDR schedule given to patient and son.

## 2018-12-13 NOTE — H&P PST ADULT - NEGATIVE CARDIOVASCULAR SYMPTOMS
no chest pain/no claudication/no palpitations/no dyspnea on exertion/no orthopnea/no paroxysmal nocturnal dyspnea/no peripheral edema

## 2018-12-13 NOTE — HISTORY OF PRESENT ILLNESS
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 c/o occasional burning on urination with little blood. No abd pain, fever, nausea, headache, dizziness. Will continue chemo at [facility] @ [facility].

## 2018-12-13 NOTE — H&P PST ADULT - NSANTHOSAYNRD_GEN_A_CORE
No. SANDRA screening performed.  STOP BANG Legend: 0-2 = LOW Risk; 3-4 = INTERMEDIATE Risk; 5-8 = HIGH Risk/never tested

## 2018-12-13 NOTE — REASON FOR VISIT
[Routine On-Treatment] : a routine on-treatment visit for [Other: ___] : [unfilled] [Family Member] : family member [Patient Declined  Services] : - None: Patient declined  services [Other: _____] : [unfilled]

## 2018-12-13 NOTE — H&P PST ADULT - RS GEN PE MLT RESP DETAILS PC
no wheezes/no rhonchi/respirations non-labored/clear to auscultation bilaterally/airway patent/breath sounds equal/no rales/no chest wall tenderness/no intercostal retractions/good air movement

## 2018-12-13 NOTE — H&P PST ADULT - NEGATIVE MUSCULOSKELETAL SYMPTOMS
no neck pain/no muscle cramps/no muscle weakness/no leg pain R/no joint swelling/no myalgia/no stiffness/no arm pain L/no arm pain R/no back pain

## 2018-12-13 NOTE — H&P PST ADULT - NEGATIVE ALLERGY TYPES
no reactions to animals/no outdoor environmental allergies/no indoor environmental allergies/no reactions to insect bites/no reactions to food

## 2018-12-13 NOTE — REVIEW OF SYSTEMS
[Genital Edema: Grade 0] : Genital Edema: Grade 0 [Vaginal Stricture: Grade 0] : Vaginal Stricture: Grade 0 [Vaginal Infection: Grade 0] : Vaginal Infection: Grade 0  [Breast Pain: Grade 0] : Breast Pain: Grade 0 [Skin Induration: Grade 0] : Skin Induration: Grade 0 [Dermatitis Radiation: Grade 0] : Dermatitis Radiation: Grade 0

## 2018-12-13 NOTE — H&P PST ADULT - ASSESSMENT
DX: malignant neoplasm of cervix uteri, unspecified.   Pt scheduled for an examination under anesthesia, dilation of cervix, Talat sleeve placement with sono guidance, cystoscopy on 12/27/18.

## 2018-12-13 NOTE — H&P PST ADULT - PROBLEM SELECTOR PLAN 2
plan of care as above C/W Metformin and aware of last dose to be taken on 12/26/18 am, hold on 12/269 pm and on 12/26/27. FS ordered for am dos.

## 2018-12-14 PROCEDURE — 77427 RADIATION TX MANAGEMENT X5: CPT

## 2018-12-14 PROCEDURE — G6002: CPT | Mod: 26

## 2018-12-15 LAB
BACTERIA UR CULT: SIGNIFICANT CHANGE UP
SPECIMEN SOURCE: SIGNIFICANT CHANGE UP

## 2018-12-17 ENCOUNTER — RESULT REVIEW (OUTPATIENT)
Age: 53
End: 2018-12-17

## 2018-12-17 ENCOUNTER — APPOINTMENT (OUTPATIENT)
Age: 53
End: 2018-12-17

## 2018-12-17 VITALS
WEIGHT: 136.79 LBS | OXYGEN SATURATION: 99 % | DIASTOLIC BLOOD PRESSURE: 78 MMHG | HEIGHT: 61 IN | HEART RATE: 86 BPM | BODY MASS INDEX: 25.83 KG/M2 | SYSTOLIC BLOOD PRESSURE: 115 MMHG | RESPIRATION RATE: 16 BRPM

## 2018-12-17 LAB
ALBUMIN SERPL ELPH-MCNC: 4.3 G/DL — SIGNIFICANT CHANGE UP (ref 3.3–5)
ALP SERPL-CCNC: 99 U/L — SIGNIFICANT CHANGE UP (ref 40–120)
ALT FLD-CCNC: 22 U/L — SIGNIFICANT CHANGE UP (ref 10–45)
ANION GAP SERPL CALC-SCNC: 16 MMOL/L — SIGNIFICANT CHANGE UP (ref 5–17)
AST SERPL-CCNC: 25 U/L — SIGNIFICANT CHANGE UP (ref 10–40)
BILIRUB SERPL-MCNC: 0.2 MG/DL — SIGNIFICANT CHANGE UP (ref 0.2–1.2)
BUN SERPL-MCNC: 7 MG/DL — SIGNIFICANT CHANGE UP (ref 7–23)
CALCIUM SERPL-MCNC: 9.5 MG/DL — SIGNIFICANT CHANGE UP (ref 8.4–10.5)
CHLORIDE SERPL-SCNC: 101 MMOL/L — SIGNIFICANT CHANGE UP (ref 96–108)
CO2 SERPL-SCNC: 23 MMOL/L — SIGNIFICANT CHANGE UP (ref 22–31)
CREAT SERPL-MCNC: 0.59 MG/DL — SIGNIFICANT CHANGE UP (ref 0.5–1.3)
GLUCOSE SERPL-MCNC: 115 MG/DL — HIGH (ref 70–99)
HCT VFR BLD CALC: 31.9 % — LOW (ref 34.5–45)
HGB BLD-MCNC: 11.2 G/DL — LOW (ref 11.5–15.5)
MAGNESIUM SERPL-MCNC: 1.7 MG/DL — SIGNIFICANT CHANGE UP (ref 1.6–2.6)
MCHC RBC-ENTMCNC: 29.2 PG — SIGNIFICANT CHANGE UP (ref 27–34)
MCHC RBC-ENTMCNC: 35.2 G/DL — SIGNIFICANT CHANGE UP (ref 32–36)
MCV RBC AUTO: 82.9 FL — SIGNIFICANT CHANGE UP (ref 80–100)
PLATELET # BLD AUTO: 160 K/UL — SIGNIFICANT CHANGE UP (ref 150–400)
POTASSIUM SERPL-MCNC: 4.1 MMOL/L — SIGNIFICANT CHANGE UP (ref 3.5–5.3)
POTASSIUM SERPL-SCNC: 4.1 MMOL/L — SIGNIFICANT CHANGE UP (ref 3.5–5.3)
PROT SERPL-MCNC: 7.7 G/DL — SIGNIFICANT CHANGE UP (ref 6–8.3)
RBC # BLD: 3.85 M/UL — SIGNIFICANT CHANGE UP (ref 3.8–5.2)
RBC # FLD: 16.2 % — HIGH (ref 10.3–14.5)
SODIUM SERPL-SCNC: 140 MMOL/L — SIGNIFICANT CHANGE UP (ref 135–145)
WBC # BLD: 6.1 K/UL — SIGNIFICANT CHANGE UP (ref 3.8–10.5)
WBC # FLD AUTO: 6.1 K/UL — SIGNIFICANT CHANGE UP (ref 3.8–10.5)

## 2018-12-17 PROCEDURE — G6002: CPT | Mod: 26

## 2018-12-17 NOTE — VITALS
[Maximal Pain Intensity: 3/10] : 3/10 [Least Pain Intensity: 0/10] : 0/10 [70: Cares for self; unalbe to carry on normal activity or do active work.] : 70: Cares for self; unable to carry on normal activity or do active work. [ECOG Performance Status: 1 - Restricted in physically strenuous activity but ambulatory and able to carry out work of a light or sedentary nature] : Performance Status: 1 - Restricted in physically strenuous activity but ambulatory and able to carry out work of a light or sedentary nature, e.g., light house work, office work [0 - No Distress] : Distress Level: 0

## 2018-12-17 NOTE — REASON FOR VISIT
[Routine On-Treatment] : a routine on-treatment visit for [Other: ___] : [unfilled] [Family Member] : family member [Other: _____] : [unfilled] [Patient Declined  Services] : - None: Patient declined  services

## 2018-12-18 DIAGNOSIS — D50.0 IRON DEFICIENCY ANEMIA SECONDARY TO BLOOD LOSS (CHRONIC): ICD-10-CM

## 2018-12-18 PROCEDURE — G6002: CPT | Mod: 26

## 2018-12-19 ENCOUNTER — OTHER (OUTPATIENT)
Age: 53
End: 2018-12-19

## 2018-12-19 PROCEDURE — G6002: CPT | Mod: 26

## 2018-12-20 ENCOUNTER — APPOINTMENT (OUTPATIENT)
Age: 53
End: 2018-12-20

## 2018-12-20 PROCEDURE — G6002: CPT | Mod: 26

## 2018-12-21 PROCEDURE — 77427 RADIATION TX MANAGEMENT X5: CPT

## 2018-12-21 PROCEDURE — G6002: CPT | Mod: 26

## 2018-12-24 ENCOUNTER — LABORATORY RESULT (OUTPATIENT)
Age: 53
End: 2018-12-24

## 2018-12-24 ENCOUNTER — RESULT REVIEW (OUTPATIENT)
Age: 53
End: 2018-12-24

## 2018-12-24 ENCOUNTER — APPOINTMENT (OUTPATIENT)
Age: 53
End: 2018-12-24

## 2018-12-24 VITALS
SYSTOLIC BLOOD PRESSURE: 121 MMHG | RESPIRATION RATE: 16 BRPM | OXYGEN SATURATION: 100 % | HEART RATE: 82 BPM | BODY MASS INDEX: 25.91 KG/M2 | DIASTOLIC BLOOD PRESSURE: 80 MMHG | HEIGHT: 61 IN | WEIGHT: 137.24 LBS

## 2018-12-24 LAB
HCT VFR BLD CALC: 27.5 % — LOW (ref 34.5–45)
HGB BLD-MCNC: 10.4 G/DL — LOW (ref 11.5–15.5)
MCHC RBC-ENTMCNC: 31 PG — SIGNIFICANT CHANGE UP (ref 27–34)
MCHC RBC-ENTMCNC: 37.6 G/DL — HIGH (ref 32–36)
MCV RBC AUTO: 82.4 FL — SIGNIFICANT CHANGE UP (ref 80–100)
PLATELET # BLD AUTO: 217 K/UL — SIGNIFICANT CHANGE UP (ref 150–400)
RBC # BLD: 3.34 M/UL — LOW (ref 3.8–5.2)
RBC # FLD: 16.9 % — HIGH (ref 10.3–14.5)
WBC # BLD: 4.1 K/UL — SIGNIFICANT CHANGE UP (ref 3.8–10.5)
WBC # FLD AUTO: 4.1 K/UL — SIGNIFICANT CHANGE UP (ref 3.8–10.5)

## 2018-12-24 PROCEDURE — G6002: CPT | Mod: 26

## 2018-12-24 NOTE — VITALS
[Maximal Pain Intensity: 3/10] : 3/10 [Least Pain Intensity: 0/10] : 0/10 [Pain Description/Quality: ___] : Pain description/quality: [unfilled] [Pain Duration: ___] : Pain duration: [unfilled] [Pain Location: ___] : Pain Location: [unfilled] [Pain Interferes with ADLs] : Pain interferes with activities of daily living. [OTC] : OTC [70: Cares for self; unalbe to carry on normal activity or do active work.] : 70: Cares for self; unable to carry on normal activity or do active work. [ECOG Performance Status: 2 - Ambulatory and capable of all self care but unable to carry out any work activities] : Performance Status: 2 - Ambulatory and capable of all self care but unable to carry out any work activities. Up and about more than 50% of waking hours [8 - Distress Level] : Distress Level: 8

## 2018-12-26 ENCOUNTER — FORM ENCOUNTER (OUTPATIENT)
Age: 53
End: 2018-12-26

## 2018-12-26 PROCEDURE — G6002: CPT | Mod: 26

## 2018-12-27 ENCOUNTER — OUTPATIENT (OUTPATIENT)
Dept: OUTPATIENT SERVICES | Facility: HOSPITAL | Age: 53
LOS: 1 days | End: 2018-12-27
Payer: COMMERCIAL

## 2018-12-27 ENCOUNTER — APPOINTMENT (OUTPATIENT)
Dept: GYNECOLOGIC ONCOLOGY | Facility: HOSPITAL | Age: 53
End: 2018-12-27

## 2018-12-27 ENCOUNTER — APPOINTMENT (OUTPATIENT)
Dept: MRI IMAGING | Facility: IMAGING CENTER | Age: 53
End: 2018-12-27
Payer: MEDICAID

## 2018-12-27 ENCOUNTER — OUTPATIENT (OUTPATIENT)
Dept: OUTPATIENT SERVICES | Facility: HOSPITAL | Age: 53
LOS: 1 days | Discharge: HOME | End: 2018-12-27
Payer: MEDICAID

## 2018-12-27 VITALS
RESPIRATION RATE: 18 BRPM | OXYGEN SATURATION: 99 % | HEART RATE: 87 BPM | DIASTOLIC BLOOD PRESSURE: 78 MMHG | SYSTOLIC BLOOD PRESSURE: 127 MMHG

## 2018-12-27 VITALS
WEIGHT: 136.03 LBS | SYSTOLIC BLOOD PRESSURE: 115 MMHG | DIASTOLIC BLOOD PRESSURE: 67 MMHG | RESPIRATION RATE: 16 BRPM | HEIGHT: 59 IN | HEART RATE: 100 BPM | TEMPERATURE: 98 F | OXYGEN SATURATION: 100 %

## 2018-12-27 DIAGNOSIS — Z90.49 ACQUIRED ABSENCE OF OTHER SPECIFIED PARTS OF DIGESTIVE TRACT: Chronic | ICD-10-CM

## 2018-12-27 DIAGNOSIS — C53.9 MALIGNANT NEOPLASM OF CERVIX UTERI, UNSPECIFIED: ICD-10-CM

## 2018-12-27 LAB
BLD GP AB SCN SERPL QL: NEGATIVE — SIGNIFICANT CHANGE UP
GLUCOSE BLDC GLUCOMTR-MCNC: 111 MG/DL — HIGH (ref 70–99)
HCG UR QL: NEGATIVE — SIGNIFICANT CHANGE UP
RH IG SCN BLD-IMP: POSITIVE — SIGNIFICANT CHANGE UP

## 2018-12-27 PROCEDURE — 72197 MRI PELVIS W/O & W/DYE: CPT

## 2018-12-27 PROCEDURE — 57156 INS VAG BRACHYTX DEVICE: CPT

## 2018-12-27 PROCEDURE — A9585: CPT

## 2018-12-27 PROCEDURE — 72197 MRI PELVIS W/O & W/DYE: CPT | Mod: 26

## 2018-12-27 RX ORDER — SODIUM CHLORIDE 9 MG/ML
1000 INJECTION, SOLUTION INTRAVENOUS
Qty: 0 | Refills: 0 | Status: DISCONTINUED | OUTPATIENT
Start: 2018-12-27 | End: 2019-01-11

## 2018-12-27 RX ORDER — IBUPROFEN 200 MG
1 TABLET ORAL
Qty: 0 | Refills: 0 | COMMUNITY

## 2018-12-27 RX ORDER — METFORMIN HYDROCHLORIDE 850 MG/1
1 TABLET ORAL
Qty: 0 | Refills: 0 | COMMUNITY

## 2018-12-27 RX ORDER — ASCORBIC ACID 60 MG
500 TABLET,CHEWABLE ORAL
Qty: 0 | Refills: 0 | COMMUNITY

## 2018-12-27 NOTE — ASU DISCHARGE PLAN (ADULT/PEDIATRIC). - NOTIFY
GYN Fever>100.4/Inability to Tolerate Liquids or Foods/Persistent Nausea and Vomiting/Unable to Urinate/Pain not relieved by Medications/Bleeding that does not stop

## 2018-12-27 NOTE — ASU DISCHARGE PLAN (ADULT/PEDIATRIC). - MEDICATION SUMMARY - MEDICATIONS TO TAKE
I will START or STAY ON the medications listed below when I get home from the hospital:    vitamin C  -- 500 milligram(s) by mouth 2 times a day  -- Indication: For home medication    ibuprofen 600 mg oral tablet  -- 1 tab(s) by mouth every 6 hours, As Needed last dose 12/20/20108  -- Indication: For Moderate pain, as needed    acetaminophen 325 mg oral tablet  -- 2 tab(s) by mouth every 6 hours, As needed, Mild Pain (1 - 3)  -- Indication: For Mild pain as needed    metFORMIN 500 mg oral tablet  -- 1 tab(s) by mouth 2 times a day  -- Indication: For home medication    metoclopramide 10 mg oral tablet  -- 1 tab(s) by mouth 4 times a day (before meals and at bedtime), As Needed  -- Indication: For home medication    ferrous sulfate 325 mg (65 mg elemental iron) oral tablet  -- 1 tab(s) by mouth once a day  -- Indication: For home medication    docusate sodium 100 mg oral capsule  -- 1 cap(s) by mouth 2 times a day, As Needed for stool softening  -- Indication: For home medication

## 2018-12-28 PROCEDURE — G6002: CPT | Mod: 26

## 2018-12-29 PROCEDURE — G6002: CPT | Mod: 26

## 2018-12-29 PROCEDURE — 77280 THER RAD SIMULAJ FIELD SMPL: CPT | Mod: 26

## 2018-12-31 ENCOUNTER — APPOINTMENT (OUTPATIENT)
Dept: HEMATOLOGY ONCOLOGY | Facility: CLINIC | Age: 53
End: 2018-12-31

## 2018-12-31 ENCOUNTER — APPOINTMENT (OUTPATIENT)
Age: 53
End: 2018-12-31

## 2018-12-31 ENCOUNTER — RESULT REVIEW (OUTPATIENT)
Age: 53
End: 2018-12-31

## 2018-12-31 VITALS
SYSTOLIC BLOOD PRESSURE: 114 MMHG | OXYGEN SATURATION: 99 % | RESPIRATION RATE: 17 BRPM | HEART RATE: 105 BPM | DIASTOLIC BLOOD PRESSURE: 65 MMHG | BODY MASS INDEX: 24.58 KG/M2 | WEIGHT: 130.07 LBS | TEMPERATURE: 99 F

## 2018-12-31 LAB
HCT VFR BLD CALC: 29.3 % — LOW (ref 34.5–45)
HGB BLD-MCNC: 10.5 G/DL — LOW (ref 11.5–15.5)
MCHC RBC-ENTMCNC: 29.7 PG — SIGNIFICANT CHANGE UP (ref 27–34)
MCHC RBC-ENTMCNC: 35.8 G/DL — SIGNIFICANT CHANGE UP (ref 32–36)
MCV RBC AUTO: 82.9 FL — SIGNIFICANT CHANGE UP (ref 80–100)
PLATELET # BLD AUTO: 155 K/UL — SIGNIFICANT CHANGE UP (ref 150–400)
RBC # BLD: 3.54 M/UL — LOW (ref 3.8–5.2)
RBC # FLD: 17.8 % — HIGH (ref 10.3–14.5)
WBC # BLD: 3.8 K/UL — SIGNIFICANT CHANGE UP (ref 3.8–10.5)
WBC # FLD AUTO: 3.8 K/UL — SIGNIFICANT CHANGE UP (ref 3.8–10.5)

## 2018-12-31 RX ORDER — LORATADINE 10 MG/1
10 TABLET ORAL
Refills: 0 | Status: COMPLETED | COMMUNITY
End: 2018-12-31

## 2018-12-31 RX ORDER — CHLORHEXIDINE GLUCONATE 4 %
325 (65 FE) LIQUID (ML) TOPICAL
Qty: 60 | Refills: 2 | Status: COMPLETED | COMMUNITY
Start: 2018-11-12 | End: 2018-12-31

## 2018-12-31 RX ORDER — SENNA 8.6 MG/1
8.6 TABLET, FILM COATED ORAL
Qty: 60 | Refills: 0 | Status: COMPLETED | COMMUNITY
Start: 2018-11-23 | End: 2018-12-31

## 2018-12-31 RX ORDER — CIPROFLOXACIN HYDROCHLORIDE 250 MG/1
250 TABLET, FILM COATED ORAL
Qty: 10 | Refills: 0 | Status: COMPLETED | COMMUNITY
Start: 2018-06-23 | End: 2018-12-31

## 2018-12-31 RX ORDER — NITROFURANTOIN MACROCRYSTALS 100 MG/1
100 CAPSULE ORAL
Qty: 10 | Refills: 0 | Status: COMPLETED | COMMUNITY
Start: 2018-10-01 | End: 2018-12-31

## 2018-12-31 RX ORDER — CLINDAMYCIN HYDROCHLORIDE 300 MG/1
300 CAPSULE ORAL
Qty: 15 | Refills: 0 | Status: COMPLETED | COMMUNITY
Start: 2018-10-23 | End: 2018-12-31

## 2018-12-31 RX ORDER — OMEPRAZOLE 40 MG/1
40 CAPSULE, DELAYED RELEASE ORAL
Qty: 30 | Refills: 0 | Status: COMPLETED | COMMUNITY
Start: 2018-09-25 | End: 2018-12-31

## 2019-01-02 ENCOUNTER — OTHER (OUTPATIENT)
Age: 54
End: 2019-01-02

## 2019-01-02 PROCEDURE — 77295 3-D RADIOTHERAPY PLAN: CPT | Mod: 26

## 2019-01-02 PROCEDURE — 57155 INSERT UTERI TANDEM/OVOIDS: CPT

## 2019-01-02 PROCEDURE — 77771 HDR RDNCL NTRSTL/ICAV BRCHTX: CPT | Mod: 26

## 2019-01-03 ENCOUNTER — APPOINTMENT (OUTPATIENT)
Age: 54
End: 2019-01-03

## 2019-01-03 PROCEDURE — 77334 RADIATION TREATMENT AID(S): CPT | Mod: 26

## 2019-01-03 PROCEDURE — 77427 RADIATION TX MANAGEMENT X5: CPT

## 2019-01-03 PROCEDURE — 77307 TELETHX ISODOSE PLAN CPLX: CPT | Mod: 26

## 2019-01-04 PROCEDURE — 77771 HDR RDNCL NTRSTL/ICAV BRCHTX: CPT | Mod: 26

## 2019-01-04 PROCEDURE — 77295 3-D RADIOTHERAPY PLAN: CPT | Mod: 26

## 2019-01-04 PROCEDURE — 57155 INSERT UTERI TANDEM/OVOIDS: CPT

## 2019-01-05 NOTE — HISTORY OF PRESENT ILLNESS
[Disease: _____________________] : Disease: [unfilled] [T: ___] : T[unfilled] [N: ___] : N[unfilled] [M: ___] : M[unfilled] [AJCC Stage: ____] : AJCC Stage: [unfilled] [Treatment Protocol] : Treatment Protocol [de-identified] : Squamous cell carcinoma [de-identified] : Gyn: Dr. Emilia Carter/ Dr. Farrell\par Radiation: Dr. Yvonne Mcmahan\par \par History taken from patient. Translated by son, Billy. Patient declined [facility] . \par \par Patient has h/o DM on Metformin, who was doing well until 8/2018 when patient had menorrhagia. She had continued bleeding, blood clot, abdominal pain and pelvic cramping with pressure until end of 9/2018 for which she presented to the [facility] ED in [place]. Patient states she had USG abdomen which showed mass, after which she was referred to Gynecologist who noted a friable cervix on examination s/p biopsy, who referred her to Dr. Carter for further evaluation. Pathology of "tissue from cervix" dated 10/12/18 showed squamous cell carcinoma. On exam by Dr. Carter on 10/18/18, there was a 6 cm firm and friable cervix with minute cervical rim at vaginal margin with possible vaginal involvement at 5:00 and L USL replaced by tumor extending to the side wall/ R USL firm, fixed and smooth, consistent with at least clinical stage IIIB disease. Dr. Carter referred patient to rad/onc for management. Patient was evaluated by Dr. Mcmahan and referred her to medical oncology. She had PET scan done on 1/7/18 with no evidence of metastatic disease.  Patient was planned to start treatment, however was admitted to [facility] with per vaginal bleeding and clots from 11/17-11/22/18.  She had IR embolization, but continued to have bleeding and hence was started on inpatient radiation on 11/20/18. She received C1 Cisplatin in house on 11/21/18.\par Patient completed external pelvic XRT on 12/28/18. She had sleeve placed on 12/27 and will be starting on brachy XRT.  [FreeTextEntry1] : concurrent chemo/xrt\par C1: Cisplatin 11/21/18 [de-identified] : Patient is doing ok. She states she is fatigued and tired. She occasionally has pelvic pain and cramps, for which she takes Tylenol which helps with her pain. Bleeding has stopped. She continues to have decreased appetite. She complaints of nausea, but has been taking 1 tab of reglan as needed. \par Bowel and bladder habits are normal. \par

## 2019-01-05 NOTE — RESULTS/DATA
[FreeTextEntry1] : XAM:  PETCT SKUL-THI ONC FDG INIT  \par \par PROCEDURE DATE:  11/07/2018  \par  \par INTERPRETATION:  PROCEDURE:  PET/CT SKULL BASE-MID THIGH IMAGING \par \par CLINICAL INFORMATION: 53-year-old female with newly diagnosed cervical \par squamous cell cancer. Evaluate for metastatic disease. PET/CT is done as \par part of the initial treatment strategy evaluation.\par \par RADIOPHARMACEUTICAL:  9.96 mCi F-18, FDG, I.V.\par \par TECHNIQUE:  Fasting blood sugar measured prior to injection of \par radiopharmaceutical was 92 mg/dl. Following intravenous injection of the \par radiopharmaceutical and an uptake period of approximately 55 minutes, \par FDG-PET/CT was obtained on a [employer] Discovery 710 from skull base to mid \par thigh. Oral contrast was given during the uptake period. CT was performed \par during shallow respiration. The CT protocol was optimized for PET \par attenuation correction and anatomic localization. The CT protocol was not \par designed to produce and cannot replace state-of-the-art diagnostic CT \par images with specific imaging protocols for different body parts and \par indications. Images were reviewed on a dedicated workstation using \par multiplanar reconstruction.\par \par The standardized uptake values (SUV) are normalized to patient body \par weight and indicate the highest activity concentration (SUVmax) in a \par given disease site. All image numbers refer to axial image number.\par \par COMPARISON:  No prior FDG-PET/CT is available.\par \par OTHER STUDIES USED FOR CORRELATION: None available.\par \par FINDINGS: \par \par HEAD/NECK: Physiologic FDG activity in visualized brain, head, and neck.  \par \par CHEST: No abnormal FDG activity. No lymphadenopathy.     \par \par LUNGS: No abnormal FDG activity. No lung nodule. \par \par PLEURA/PERICARDIUM: No abnormal FDG activity. No effusion.    \par \par HEPATOBILIARY/PANCREAS:  Physiologic FDG activity. Liver SUV mean is 2.1.\par \par SPLEEN: Diffuse, mild probably increased FDG activity in the spleen which \par is more intense than hepatic uptake of FDG (SUV max 4.1; SUV mean 3.1). \par Normal in size.    \par \par ADRENAL GLANDS: No abnormal FDG activity. No nodule.\par \par KIDNEYS/URINARY BLADDER: Physiologic excreted FDG activity.\par \par REPRODUCTIVE ORGANS: A hypermetabolic mass in the uterine cervix measures \par approximately 5.5 cm in maximum transverse diameter (SUV 20.2; image \par 194). Hypermetabolism extends into the lower uterine segment which is not \par well evaluated on CT in the absence of intravenous contrast.\par \par ABDOMINOPELVIC NODES: No enlarged or FDG-avid lymph node.    \par \par BOWEL/PERITONEUM/MESENTERY: Pancolonic hypermetabolism, without \par corresponding abnormality on CT, probably is secondary to metformin.\par \par BONES/SOFT TISSUES: Mild, diffuse bone marrow hypermetabolism in the \par axial and proximal appendicular skeleton, without corresponding \par abnormalities on CT. For reference, L4 demonstrates SUV 4.8 (image 161).\par \par IMPRESSION: Abnormal FDG-PET/CT scan.\par \par 1. Hypermetabolic cervical mass with extension of hypermetabolism into \par lower uterine segment, corresponds to known cervical carcinoma. Pelvic \par MRI may be obtained to further delineate the lesion.\par \par 2. Nonspecific diffuse splenic and bone marrow hypermetabolism may be \par secondary to anemia.\par \par 3. Nonspecific pancolonic hypermetabolism may be secondary to metformin \par therapy.\par \par 4. No evidence of metastatic disease.\par \par ZAKI PELAEZ M.D., NUCLEAR MEDICINE ATTENDING \par This document has been electronically signed. Nov 8 2018 10:58AM\par   \par \par   \par

## 2019-01-05 NOTE — ASSESSMENT
[FreeTextEntry1] : Ms. Leggett is a 52 y/o F with h/o DM on Metformin, who was doing well until 8/2018 when patient had menorrhagia. She had continued bleeding, blood clot, abdominal pain and pelvic cramping with pressure until end of 9/2018 for which she presented to the [facility] ED in [place]. Patient states she had USG abdomen which showed mass, after which she was referred to Gynecologist, who referred her to Dr. Carter for further evaluation. Pathology of "tissue from cervix" dated 10/12/18 showed squamous cell carcinoma. On exam by Dr. Carter on 10/18/18, there was a 6 cm firm and friable cervix with possible vaginal involvement at 5:00 and L USL replaced by tumor extending to the side wall, consistent with at least clinical stage IIIB disease. Dr. Carter referred patient to rad/onc for management. Patient was evaluated by Dr. Mcmahan and referred her to medical oncology. Patient had a PET scan which showed no evidence of metastatic disease. \par Patient was planned to start treatment, however was admitted to the hospital with massive per vaginal bleeding, 11/17-11/22/18. She had IR guided embolization, with continued bleeding due to which was started on inpatient radiation on 11/20/18 and Cisplatin on 11/21/18. Patient completed external pelvic radiation on 12/28 . She completed 5 cycles of Cisplatin. She will start brachytherapy next week. \par  \par 1. Cervical Cancer:\par -Patient with clinical Stage IIIB with left sided pelvic USL extension. PET scan showed no evidence of metastatic disease.\par -Patient completed concurrent radiation. Plan for brachytherapy. Getting radiation under Dr. Mcmahan.\par -Encouraged to schedule for IV hdyration as needed. Will plan 1 L NS today, due to weakness and dehydration.\par -Encouraged patient to use anti-emetic as needed for nausea. \par -Continue to f/u Dr. Farrell. \par -Dietary counseling. \par -Tylenol as needed for pain. \par -Plan for PET scan 8-12 weeks after completion of radiation, will d/w Dr. Mcmahan on when best to schedule for PET scan. \par -Plan discussed and questions answered per their apparent satisfaction. \par \par 2. Iron deficiency anemia:\par -S/P 2 doses of Feraheme. Monitor CBC. \par \par F/U with results of PET scan. \par \par D/W Dr. De La O following longitudinally with Dr. Menendez.

## 2019-01-07 ENCOUNTER — APPOINTMENT (OUTPATIENT)
Age: 54
End: 2019-01-07

## 2019-01-07 PROCEDURE — 57155 INSERT UTERI TANDEM/OVOIDS: CPT

## 2019-01-07 PROCEDURE — 77771 HDR RDNCL NTRSTL/ICAV BRCHTX: CPT | Mod: 26

## 2019-01-07 PROCEDURE — 77295 3-D RADIOTHERAPY PLAN: CPT | Mod: 26

## 2019-01-08 NOTE — CONSULT NOTE ADULT - REASON FOR ADMISSION
Addended by: RASHEED CANTU on: 1/8/2019 08:53 AM     Modules accepted: Orders     Stage IIIB cervical cancer, vaginal bleeding

## 2019-01-09 PROCEDURE — 77295 3-D RADIOTHERAPY PLAN: CPT | Mod: 26

## 2019-01-09 PROCEDURE — 77771 HDR RDNCL NTRSTL/ICAV BRCHTX: CPT | Mod: 26

## 2019-01-09 PROCEDURE — 57155 INSERT UTERI TANDEM/OVOIDS: CPT

## 2019-01-10 ENCOUNTER — APPOINTMENT (OUTPATIENT)
Age: 54
End: 2019-01-10

## 2019-01-13 NOTE — DISEASE MANAGEMENT
[Clinical] : TNM Stage: c [IIIB] : IIIB [TTNM] : 3b [NTNM] : x [MTNM] : x [de-identified] : pelvis

## 2019-01-13 NOTE — PHYSICAL EXAM
[Abdomen Soft] : soft [Abdomen Tenderness] : non-tender [] : no hepato-splenomegaly [Normal External Genitalia] : normal external genitalia  [de-identified] : no skin reaction of treated area [de-identified] : speculum exam:  cervix tumor softer and smaller, still has left parametrial involvement, no vaginal disease

## 2019-01-13 NOTE — REVIEW OF SYSTEMS
[Constipation: Grade 0] : Constipation: Grade 0 [Diarrhea: Grade 0] : Diarrhea: Grade 0 [Urinary Urgency: Grade 0] : Urinary Urgency: Grade 0 [Urinary Frequency: Grade 0] : Urinary Frequency: Grade 0 [Skin Hyperpigmentation: Grade 0] : Skin Hyperpigmentation: Grade 0 [Skin Induration: Grade 0] : Skin Induration: Grade 0 [Dermatitis Radiation: Grade 0] : Dermatitis Radiation: Grade 0

## 2019-01-13 NOTE — DISEASE MANAGEMENT
[Clinical] : TNM Stage: c [IIIB] : IIIB [TTNM] : 3b [NTNM] : x [MTNM] : x [de-identified] : pelvis

## 2019-01-13 NOTE — REVIEW OF SYSTEMS
[Constipation: Grade 0] : Constipation: Grade 0 [Diarrhea: Grade 0] : Diarrhea: Grade 0 [Fatigue: Grade 2 - Fatigue not relieved by rest; limiting instrumental ADL] : Fatigue: Grade 2 - Fatigue not relieved by rest; limiting instrumental ADL [Urinary Urgency: Grade 0] : Urinary Urgency: Grade 0 [Urinary Frequency: Grade 0] : Urinary Frequency: Grade 0 [Skin Hyperpigmentation: Grade 0] : Skin Hyperpigmentation: Grade 0 [Skin Induration: Grade 0] : Skin Induration: Grade 0 [Dermatitis Radiation: Grade 0] : Dermatitis Radiation: Grade 0

## 2019-01-13 NOTE — HISTORY OF PRESENT ILLNESS
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 \par Patient with c/o occasional burning on urination. Has small little blood clots that fall from vagina after urination (vaginal, not with urine). No bleeding with urination. \par Had abdominal pain that resolved with ibuprofen.\par Having about one normal bowel movement a day.\par No fever, nausea, headache, dizziness. \par Will continue chemo at [facility] @ [facility].  To get cycle 4 today.\par Saw Dr. Farrell - arranged for Talat sleeve 12/27.  MRI to be done later that day after Talat sleeve.\par HDR schedule given to patient and son.

## 2019-01-13 NOTE — HISTORY OF PRESENT ILLNESS
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 \par Patient with c/o stable occasional burning on urination.  No vaginal bleeding.\par Had abdominal pain that resolved with ibuprofen. No diarrhea.\par No fever, nausea, headache, dizziness.  Will continue chemo at [facility] @ [facility].

## 2019-01-13 NOTE — HISTORY OF PRESENT ILLNESS
[FreeTextEntry1] : Patient denies any new complaints.  S/P Smitt sleeve.  First T&O procedure for 12/28 had to be cancelled because patient ate in the morning prior to procedure.\par

## 2019-01-13 NOTE — REVIEW OF SYSTEMS
[Diarrhea: Grade 1 - Increase of <4 stools per day over baseline; mild increase in ostomy output compared to baseline] : Diarrhea: Grade 1 - Increase of <4 stools per day over baseline; mild increase in ostomy output compared to baseline [Nausea: Grade 0] : Nausea: Grade 0 [Skin Induration: Grade 0] : Skin Induration: Grade 0 [Dermatitis Radiation: Grade 0] : Dermatitis Radiation: Grade 0 [FreeTextEntry3] : one episode of loose stool, two days ago, relsoved without treatement

## 2019-01-13 NOTE — REVIEW OF SYSTEMS
[Diarrhea: Grade 0] : Diarrhea: Grade 0 [Nausea: Grade 0] : Nausea: Grade 0 [Skin Induration: Grade 0] : Skin Induration: Grade 0 [Dermatitis Radiation: Grade 0] : Dermatitis Radiation: Grade 0 [FreeTextEntry3] : one episode of loose stool, two days ago, relsoved without treatement

## 2019-01-13 NOTE — VITALS
[Maximal Pain Intensity: 1/10] : 1/10 [Least Pain Intensity: 0/10] : 0/10 [80: Normal activity with effort; some signs or symptoms of disease.] : 80: Normal activity with effort; some signs or symptoms of disease.

## 2019-01-14 ENCOUNTER — APPOINTMENT (OUTPATIENT)
Age: 54
End: 2019-01-14

## 2019-01-22 ENCOUNTER — APPOINTMENT (OUTPATIENT)
Age: 54
End: 2019-01-22

## 2019-01-23 ENCOUNTER — APPOINTMENT (OUTPATIENT)
Dept: RADIATION ONCOLOGY | Facility: CLINIC | Age: 54
End: 2019-01-23
Payer: MEDICAID

## 2019-01-23 VITALS
HEART RATE: 97 BPM | DIASTOLIC BLOOD PRESSURE: 97 MMHG | WEIGHT: 126.44 LBS | BODY MASS INDEX: 23.89 KG/M2 | TEMPERATURE: 97.7 F | SYSTOLIC BLOOD PRESSURE: 131 MMHG | RESPIRATION RATE: 14 BRPM

## 2019-01-23 VITALS
SYSTOLIC BLOOD PRESSURE: 112 MMHG | RESPIRATION RATE: 17 BRPM | TEMPERATURE: 97.9 F | HEART RATE: 95 BPM | HEIGHT: 61 IN | OXYGEN SATURATION: 99 % | WEIGHT: 130.73 LBS | BODY MASS INDEX: 24.68 KG/M2 | DIASTOLIC BLOOD PRESSURE: 75 MMHG

## 2019-01-23 PROCEDURE — 99024 POSTOP FOLLOW-UP VISIT: CPT

## 2019-01-27 NOTE — REVIEW OF SYSTEMS
[Fatigue] : fatigue [Negative] : Gastrointestinal [Fatigue: Grade 0] : Fatigue: Grade 0 [Nausea: Grade 0] : Nausea: Grade 0 [Rectal Pain: Grade 0] : Rectal Pain: Grade 0 [Hematuria: Grade 0] : Hematuria: Grade 0 [Urinary Incontinence: Grade 0] : Urinary Incontinence: Grade 0  [Urinary Retention: Grade 0] : Urinary Retention: Grade 0 [Fever] : no fever [Chills] : no chills [Night Sweats] : no night sweats [Urinary Frequency] : no change in urinary frequency [Vaginal Discharge] : no vaginal discharge [Skin Rash] : no skin rash [FreeTextEntry8] : as noted in HPI

## 2019-01-27 NOTE — PHYSICAL EXAM
[General Appearance - Alert] : alert [General Appearance - In No Acute Distress] : in no acute distress [Sclera] : the sclera and conjunctiva were normal [Respiration, Rhythm And Depth] : normal respiratory rhythm and effort [Auscultation Breath Sounds / Voice Sounds] : lungs were clear to auscultation bilaterally [Heart Rate And Rhythm] : heart rate and rhythm were normal [Heart Sounds] : normal S1 and S2 [Bowel Sounds] : normal bowel sounds [Supraclavicular Lymph Nodes Enlarged Bilaterally] : supraclavicular [Oriented To Time, Place, And Person] : oriented to person, place, and time [Abdomen Tenderness] : non-tender [] : no hepato-splenomegaly [Normal External Genitalia] : normal external genitalia  [Cervical Lymph Nodes Enlarged Posterior Bilaterally] : posterior cervical [Cervical Lymph Nodes Enlarged Anterior Bilaterally] : anterior cervical [Femoral Lymph Nodes Enlarged Bilaterally] : femoral [Inguinal Lymph Nodes Enlarged Bilaterally] : inguinal [No Spine Tenderness] : no tenderness to palpation of the vertebral spine [No Focal Deficits] : no focal deficits [de-identified] : cervix with smaller residual tumor vs. treatment related induration, no active bleeding, no vaginal lesions

## 2019-01-27 NOTE — VITALS
[Pain Location: ___] : Pain Location: [unfilled] [90: Able to carry normal activity; minor signs or symptoms of disease.] : 90: Able to carry normal activity; minor signs or symptoms of disease.  [ECOG Performance Status: 1 - Restricted in physically strenuous activity but ambulatory and able to carry out work of a light or sedentary nature] : Performance Status: 1 - Restricted in physically strenuous activity but ambulatory and able to carry out work of a light or sedentary nature, e.g., light house work, office work [Maximal Pain Intensity: 1/10] : 1/10 [Least Pain Intensity: 0/10] : 0/10

## 2019-01-27 NOTE — HISTORY OF PRESENT ILLNESS
[FreeTextEntry1] : \par Patient is a 53 year old female with locally advanced clinical stage IIIB squamous cell carcinoma of the cervix. She was treated with concurrent chemotherapy and pelvic external beam radiation (5100 cGy),  and tandem and ovoid brachytherapy boost to the cervix tumor (2800 cGy in 4 fractions HDR).  She completed radiation therapy on 1/10/19.  She tolerated treatment well. She did not develop any grade 3 or higher acute toxicity.\par \par [facility] # [id number]\par 1/23/19- Follow up\par Today she notes stable occasional slight burning on urination and vaginal itching. Denies blood in stool or urine, fever, or back /flank pain.  She denies vaginal bleeding or diarrhea. No new complaints.  She has not seen Dr. Farrell or Dr. Jalloh / Patel.\par

## 2019-02-19 ENCOUNTER — OUTPATIENT (OUTPATIENT)
Dept: OUTPATIENT SERVICES | Facility: HOSPITAL | Age: 54
LOS: 1 days | Discharge: HOME | End: 2019-02-19

## 2019-02-19 DIAGNOSIS — D50.0 IRON DEFICIENCY ANEMIA SECONDARY TO BLOOD LOSS (CHRONIC): ICD-10-CM

## 2019-02-19 DIAGNOSIS — Z90.49 ACQUIRED ABSENCE OF OTHER SPECIFIED PARTS OF DIGESTIVE TRACT: Chronic | ICD-10-CM

## 2019-02-19 DIAGNOSIS — C53.9 MALIGNANT NEOPLASM OF CERVIX UTERI, UNSPECIFIED: ICD-10-CM

## 2019-02-28 ENCOUNTER — APPOINTMENT (OUTPATIENT)
Dept: HEMATOLOGY ONCOLOGY | Facility: CLINIC | Age: 54
End: 2019-02-28

## 2019-03-05 ENCOUNTER — APPOINTMENT (OUTPATIENT)
Dept: RADIATION ONCOLOGY | Facility: CLINIC | Age: 54
End: 2019-03-05
Payer: MEDICAID

## 2019-03-05 VITALS
OXYGEN SATURATION: 99 % | RESPIRATION RATE: 16 BRPM | BODY MASS INDEX: 24.89 KG/M2 | SYSTOLIC BLOOD PRESSURE: 113 MMHG | HEART RATE: 87 BPM | DIASTOLIC BLOOD PRESSURE: 77 MMHG | WEIGHT: 131.72 LBS

## 2019-03-05 PROCEDURE — 99024 POSTOP FOLLOW-UP VISIT: CPT

## 2019-03-05 RX ORDER — METOCLOPRAMIDE 10 MG/1
10 TABLET ORAL
Qty: 60 | Refills: 2 | Status: DISCONTINUED | COMMUNITY
Start: 2018-11-12 | End: 2019-03-05

## 2019-03-05 RX ORDER — METOCLOPRAMIDE 10 MG/1
10 TABLET ORAL
Qty: 60 | Refills: 2 | Status: DISCONTINUED | COMMUNITY
Start: 2018-12-06 | End: 2019-03-05

## 2019-03-05 RX ORDER — POLYETHYLENE GLYCOL 3350 17 G/17G
17 POWDER, FOR SOLUTION ORAL
Qty: 30 | Refills: 6 | Status: DISCONTINUED | COMMUNITY
Start: 2018-12-31 | End: 2019-03-05

## 2019-03-05 RX ORDER — OXYCODONE 5 MG/1
5 TABLET ORAL
Qty: 28 | Refills: 0 | Status: DISCONTINUED | COMMUNITY
Start: 2018-11-23 | End: 2019-03-05

## 2019-03-05 RX ORDER — DOCUSATE SODIUM 100 MG/1
100 CAPSULE ORAL TWICE DAILY
Qty: 60 | Refills: 2 | Status: DISCONTINUED | COMMUNITY
Start: 2018-11-12 | End: 2019-03-05

## 2019-03-07 ENCOUNTER — RESULT REVIEW (OUTPATIENT)
Age: 54
End: 2019-03-07

## 2019-03-07 ENCOUNTER — APPOINTMENT (OUTPATIENT)
Dept: HEMATOLOGY ONCOLOGY | Facility: CLINIC | Age: 54
End: 2019-03-07

## 2019-03-07 VITALS
WEIGHT: 130.93 LBS | TEMPERATURE: 210.92 F | DIASTOLIC BLOOD PRESSURE: 71 MMHG | HEART RATE: 87 BPM | SYSTOLIC BLOOD PRESSURE: 102 MMHG | BODY MASS INDEX: 24.74 KG/M2 | RESPIRATION RATE: 18 BRPM | OXYGEN SATURATION: 100 %

## 2019-03-07 LAB
CANCER AG125 SERPL-ACNC: 24 U/ML — SIGNIFICANT CHANGE UP
HCT VFR BLD CALC: 33.2 % — LOW (ref 34.5–45)
HGB BLD-MCNC: 11.7 G/DL — SIGNIFICANT CHANGE UP (ref 11.5–15.5)
MCHC RBC-ENTMCNC: 31.8 PG — SIGNIFICANT CHANGE UP (ref 27–34)
MCHC RBC-ENTMCNC: 35.2 G/DL — SIGNIFICANT CHANGE UP (ref 32–36)
MCV RBC AUTO: 90.3 FL — SIGNIFICANT CHANGE UP (ref 80–100)
PLATELET # BLD AUTO: 225 K/UL — SIGNIFICANT CHANGE UP (ref 150–400)
RBC # BLD: 3.67 M/UL — LOW (ref 3.8–5.2)
RBC # FLD: 12.6 % — SIGNIFICANT CHANGE UP (ref 10.3–14.5)
WBC # BLD: 4.3 K/UL — SIGNIFICANT CHANGE UP (ref 3.8–10.5)
WBC # FLD AUTO: 4.3 K/UL — SIGNIFICANT CHANGE UP (ref 3.8–10.5)

## 2019-03-07 NOTE — PHYSICAL EXAM
[General Appearance - Alert] : alert [General Appearance - In No Acute Distress] : in no acute distress [Sclera] : the sclera and conjunctiva were normal [Respiration, Rhythm And Depth] : normal respiratory rhythm and effort [Auscultation Breath Sounds / Voice Sounds] : lungs were clear to auscultation bilaterally [Abdomen Soft] : soft [Abdomen Tenderness] : non-tender [] : no hepato-splenomegaly [No Rectal Mass] : no rectal mass [Normal External Genitalia] : normal external genitalia  [Normal Vaginal Cuff] : vaginal cuff without lesion or nodularity [Normal] : no palpable adenopathy [Range of Motion to Joints] : range of motion to joints [Motor Tone] : muscle strength and tone were normal [No Focal Deficits] : no focal deficits [Oriented To Time, Place, And Person] : oriented to person, place, and time [de-identified] : residual induration in the area of cervix which is much smaller with no parametrial or side wall involvement

## 2019-03-07 NOTE — HISTORY OF PRESENT ILLNESS
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 She was treated with concurrent chemotherapy and pelvic external beam radiation (5100 cGy), and tandem and ovoid brachytherapy boost to the cervix tumor (2800 cGy in 4 fractions HDR). She completed radiation therapy on 1/10/19. She tolerated treatment well. She did not develop any grade 3 or higher acute toxicity.\par \par 3/5/19 Follow up with [facility]  # [id number] (duration of call 1 hour and 14 minutes)\par \par Today she notes stable occasional burning when starting to urinate then it goes away when urine comes. She is moving bowels with no problem.   Denies vaginal bleeding or discharge. No diarrhea.   She denies any new symptoms or complaints.  Her weight has been stable.  She asks for a prescription for a "special diet" but I told her that she does not need a special diet.  She is doing well on her regular diet.\par \par She has not seen Dr. Farrell or Dr. Carter or Dr. Jalloh/Patel since her last appointment here.  I explained and instructed her again  that she needed to follow up with gynecologic oncology and medical oncology as well.  For gynecologic oncology, she can see either Dr. Carter at [facility] (the physician who initially evaluated her) or Dr. Farrell (the physician who saw her and performed insertion of Talat sleeve during the course of treatment).  Patient kept asking to see gynecology oncology at this location / building ([address] [place] [address]).  I told her we do not have gynecologic oncology department in this building and that she needs to see Dr. Carter or Dr. Farrell and wrote their names for her again.  \par \par Patient reports that she has not been sexually active.  We discussed the use of vaginal dilator to minimize vaginal stenosis / fibrosis.  via , I went over in detail the instructions with an actual dilator and provided her with some lubrication packets.\par \par

## 2019-03-07 NOTE — REASON FOR VISIT
[Post-Treatment Evaluation] : post-treatment evaluation for [Other: ___] : [unfilled] [FreeTextEntry1] : [id number] [FreeTextEntry2] : [place]

## 2019-03-07 NOTE — REVIEW OF SYSTEMS
[Anal Pain: Grade 0] : Anal Pain: Grade 0 [Constipation: Grade 0] : Constipation: Grade 0 [Diarrhea: Grade 0] : Diarrhea: Grade 0 [Rectal Pain: Grade 0] : Rectal Pain: Grade 0 [Fatigue: Grade 1 - Fatigue relieved by rest] : Fatigue: Grade 1 - Fatigue relieved by rest [Urinary Incontinence: Grade 0] : Urinary Incontinence: Grade 0  [Urinary Retention: Grade 0] : Urinary Retention: Grade 0 [Urinary Tract Pain: Grade 0] : Urinary Tract Pain: Grade 0 [Urinary Urgency: Grade 0] : Urinary Urgency: Grade 0 [Urinary Frequency: Grade 0] : Urinary Frequency: Grade 0 [Vaginal Infection: Grade 0] : Vaginal Infection: Grade 0  [Dyspareunia: Grade 0] : Dyspareunia: Grade 0 [Skin Hyperpigmentation: Grade 0] : Skin Hyperpigmentation: Grade 0 [Dermatitis Radiation: Grade 0] : Dermatitis Radiation: Grade 0 [Dyspepsia: Grade 0] : Dyspepsia: Grade 0 [Dysphagia: Grade 0] : Dysphagia: Grade 0 [Fecal Incontinence: Grade 0] : Fecal Incontinence: Grade 0 [Nausea: Grade 0] : Nausea: Grade 0 [Proctitis: Grade 0] : Proctitis: Grade 0 [Small Intestinal Obstruction: Grade 0] : Small Intestinal Obstruction: Grade 0 [Skin Induration: Grade 0] : Skin Induration: Grade 0

## 2019-03-25 NOTE — REVIEW OF SYSTEMS
[Fatigue] : fatigue [Negative] : Allergic/Immunologic [Fever] : no fever [Chills] : no chills [Night Sweats] : no night sweats [Recent Change In Weight] : ~T no recent weight change [Chest Pain] : no chest pain [Palpitations] : no palpitations [Leg Claudication] : no intermittent leg claudication [Lower Ext Edema] : no lower extremity edema [Shortness Of Breath] : no shortness of breath [Wheezing] : no wheezing [Cough] : no cough [SOB on Exertion] : no shortness of breath during exertion [Abdominal Pain] : no abdominal pain [Vomiting] : no vomiting [Constipation] : no constipation [Diarrhea] : no diarrhea [Dysuria] : no dysuria [Incontinence] : no incontinence [Vaginal Discharge] : no vaginal discharge [Dysmenorrhea/Abn Vaginal Bleeding] : no dysmenorrhea/abnormal vaginal bleeding

## 2019-03-25 NOTE — RESULTS/DATA
[FreeTextEntry1] : XAM:  PETCT SKUL-THI ONC FDG INIT  \par \par PROCEDURE DATE:  11/07/2018  \par  \par INTERPRETATION:  PROCEDURE:  PET/CT SKULL BASE-MID THIGH IMAGING \par \par CLINICAL INFORMATION: 53-year-old female with newly diagnosed cervical \par squamous cell cancer. Evaluate for metastatic disease. PET/CT is done as \par part of the initial treatment strategy evaluation.\par \par RADIOPHARMACEUTICAL:  9.96 mCi F-18, FDG, I.V.\par \par TECHNIQUE:  Fasting blood sugar measured prior to injection of \par radiopharmaceutical was 92 mg/dl. Following intravenous injection of the \par radiopharmaceutical and an uptake period of approximately 55 minutes, \par FDG-PET/CT was obtained on a [employer] 710 from skull base to mid \par thigh. Oral contrast was given during the uptake period. CT was performed \par during shallow respiration. The CT protocol was optimized for PET \par attenuation correction and anatomic localization. The CT protocol was not \par designed to produce and cannot replace state-of-the-art diagnostic CT \par images with specific imaging protocols for different body parts and \par indications. Images were reviewed on a dedicated workstation using \par multiplanar reconstruction.\par \par The standardized uptake values (SUV) are normalized to patient body \par weight and indicate the highest activity concentration (SUVmax) in a \par given disease site. All image numbers refer to axial image number.\par \par COMPARISON:  No prior FDG-PET/CT is available.\par \par OTHER STUDIES USED FOR CORRELATION: None available.\par \par FINDINGS: \par \par HEAD/NECK: Physiologic FDG activity in visualized brain, head, and neck.  \par \par CHEST: No abnormal FDG activity. No lymphadenopathy.     \par \par LUNGS: No abnormal FDG activity. No lung nodule. \par \par PLEURA/PERICARDIUM: No abnormal FDG activity. No effusion.    \par \par HEPATOBILIARY/PANCREAS:  Physiologic FDG activity. Liver SUV mean is 2.1.\par \par SPLEEN: Diffuse, mild probably increased FDG activity in the spleen which \par is more intense than hepatic uptake of FDG (SUV max 4.1; SUV mean 3.1). \par Normal in size.    \par \par ADRENAL GLANDS: No abnormal FDG activity. No nodule.\par \par KIDNEYS/URINARY BLADDER: Physiologic excreted FDG activity.\par \par REPRODUCTIVE ORGANS: A hypermetabolic mass in the uterine cervix measures \par approximately 5.5 cm in maximum transverse diameter (SUV 20.2; image \par 194). Hypermetabolism extends into the lower uterine segment which is not \par well evaluated on CT in the absence of intravenous contrast.\par \par ABDOMINOPELVIC NODES: No enlarged or FDG-avid lymph node.    \par \par BOWEL/PERITONEUM/MESENTERY: Pancolonic hypermetabolism, without \par corresponding abnormality on CT, probably is secondary to metformin.\par \par BONES/SOFT TISSUES: Mild, diffuse bone marrow hypermetabolism in the \par axial and proximal appendicular skeleton, without corresponding \par abnormalities on CT. For reference, L4 demonstrates SUV 4.8 (image 161).\par \par IMPRESSION: Abnormal FDG-PET/CT scan.\par \par 1. Hypermetabolic cervical mass with extension of hypermetabolism into \par lower uterine segment, corresponds to known cervical carcinoma. Pelvic \par MRI may be obtained to further delineate the lesion.\par \par 2. Nonspecific diffuse splenic and bone marrow hypermetabolism may be \par secondary to anemia.\par \par 3. Nonspecific pancolonic hypermetabolism may be secondary to metformin \par therapy.\par \par 4. No evidence of metastatic disease.\par \par ZAKI PELAEZ M.D., NUCLEAR MEDICINE ATTENDING \par This document has been electronically signed. Nov 8 2018 10:58AM\par   \par \par   \par

## 2019-03-25 NOTE — ASSESSMENT
[FreeTextEntry1] : Ms. Leggett is a 54 y/o F with h/o DM on Metformin, who was doing well until 8/2018 when patient had menorrhagia. She had continued bleeding, blood clot, abdominal pain and pelvic cramping with pressure until end of 9/2018 for which she presented to the [facility] ED in [place]. Patient states she had USG abdomen which showed mass, after which she was referred to Gynecologist, who referred her to Dr. Carter for further evaluation. Pathology of "tissue from cervix" dated 10/12/18 showed squamous cell carcinoma. On exam by Dr. Carter on 10/18/18, there was a 6 cm firm and friable cervix with possible vaginal involvement at 5:00 and L USL replaced by tumor extending to the side wall, consistent with at least clinical stage IIIB disease. Dr. Carter referred patient to rad/onc for management. Patient was evaluated by Dr. Mcmahan and referred her to medical oncology. Patient had a PET scan which showed no evidence of metastatic disease. \par Patient was planned to start treatment, however was admitted to the hospital with massive per vaginal bleeding, 11/17-11/22/18. She had IR guided embolization, with continued bleeding due to which was started on inpatient radiation on 11/20/18 and Cisplatin on 11/21/18. Patient completed external pelvic radiation on 12/28, 5100cGy . She completed 5 cycles of Cisplatin. Completed brachytherapy on 1/10/19, 2800 cGy. \par  \par 1. Cervical Cancer:\par -Patient with clinical Stage IIIB with left sided pelvic USL extension. PET scan showed no evidence of metastatic disease.\par -Patient completed concurrent radiation. S/p  brachytherapy completion in 1/10/19. \par -patient needs to f/u with Dr. Farrell from gyn-onc\par -ordered PET-CT today to be done towards end of March which is sufficient time lapse from completion of RT. \par -Plan discussed and questions answered per their apparent satisfaction. \par \par 2. Iron deficiency anemia:\par -S/P 2 doses of Feraheme. Monitor CBC. \par \par Return in 3 months or sooner as needed\par \par D/W Dr. Aleman following longitudinally with Dr. Menendez.

## 2019-03-25 NOTE — HISTORY OF PRESENT ILLNESS
[Disease: _____________________] : Disease: [unfilled] [T: ___] : T[unfilled] [N: ___] : N[unfilled] [M: ___] : M[unfilled] [AJCC Stage: ____] : AJCC Stage: [unfilled] [Treatment Protocol] : Treatment Protocol [de-identified] : Gyn: Dr. Emilia Carter/ Dr. Farrell\par Radiation: Dr. Yvonne Mcmahan\par \par History taken from patient. Translated by son, Billy. Patient declined [employer] . \par \par Patient has h/o DM on Metformin, who was doing well until 8/2018 when patient had menorrhagia. She had continued bleeding, blood clot, abdominal pain and pelvic cramping with pressure until end of 9/2018 for which she presented to the [facility] ED in [place]. Patient states she had USG abdomen which showed mass, after which she was referred to Gynecologist who noted a friable cervix on examination s/p biopsy, who referred her to Dr. Carter for further evaluation. Pathology of "tissue from cervix" dated 10/12/18 showed squamous cell carcinoma. On exam by Dr. Carter on 10/18/18, there was a 6 cm firm and friable cervix with minute cervical rim at vaginal margin with possible vaginal involvement at 5:00 and L USL replaced by tumor extending to the side wall/ R USL firm, fixed and smooth, consistent with at least clinical stage IIIB disease. Dr. Carter referred patient to rad/onc for management. Patient was evaluated by Dr. Mcmahan and referred her to medical oncology. She had PET scan done on 1/7/18 with no evidence of metastatic disease.  Patient was planned to start treatment, however was admitted to [facility] with per vaginal bleeding and clots from 11/17-11/22/18.  She had IR embolization, but continued to have bleeding and hence was started on inpatient radiation on 11/20/18. She received C1 Cisplatin in house on 11/21/18.\par Patient completed external pelvic XRT (5100 cGy) on 12/28/18. She had sleeve placed on 12/27. She had ovoid brachytherapy boost to the cervix tumor (2800 cGy in 4 fractions HDR). She completed radiation therapy on 1/10/19. She tolerated treatment well. She did not develop any grade 3 or higher acute toxicity.\par  [de-identified] : Squamous cell carcinoma [FreeTextEntry1] : concurrent chemo/xrt\par C1: Cisplatin 11/21/18 [de-identified] : Patient presents for follow-up, no acute complaints. She is feeling well. Denying abdominal or pelvic pain. No bleeding. She saw Dr. Mcmahan from RT yesterday.

## 2019-04-02 ENCOUNTER — APPOINTMENT (OUTPATIENT)
Dept: GYNECOLOGIC ONCOLOGY | Facility: CLINIC | Age: 54
End: 2019-04-02
Payer: MEDICAID

## 2019-04-02 VITALS — HEIGHT: 61 IN | BODY MASS INDEX: 25.49 KG/M2 | WEIGHT: 135 LBS

## 2019-04-02 PROCEDURE — 99214 OFFICE O/P EST MOD 30 MIN: CPT

## 2019-04-06 ENCOUNTER — APPOINTMENT (OUTPATIENT)
Dept: NUCLEAR MEDICINE | Facility: IMAGING CENTER | Age: 54
End: 2019-04-06

## 2019-04-12 ENCOUNTER — OUTPATIENT (OUTPATIENT)
Dept: OUTPATIENT SERVICES | Facility: HOSPITAL | Age: 54
LOS: 1 days | Discharge: HOME | End: 2019-04-12

## 2019-04-12 DIAGNOSIS — D50.0 IRON DEFICIENCY ANEMIA SECONDARY TO BLOOD LOSS (CHRONIC): ICD-10-CM

## 2019-04-12 DIAGNOSIS — Z90.49 ACQUIRED ABSENCE OF OTHER SPECIFIED PARTS OF DIGESTIVE TRACT: Chronic | ICD-10-CM

## 2019-04-12 DIAGNOSIS — C53.9 MALIGNANT NEOPLASM OF CERVIX UTERI, UNSPECIFIED: ICD-10-CM

## 2019-04-13 ENCOUNTER — FORM ENCOUNTER (OUTPATIENT)
Age: 54
End: 2019-04-13

## 2019-04-14 ENCOUNTER — APPOINTMENT (OUTPATIENT)
Dept: NUCLEAR MEDICINE | Facility: IMAGING CENTER | Age: 54
End: 2019-04-14
Payer: MEDICAID

## 2019-04-14 ENCOUNTER — OUTPATIENT (OUTPATIENT)
Dept: OUTPATIENT SERVICES | Facility: HOSPITAL | Age: 54
LOS: 1 days | End: 2019-04-14
Payer: COMMERCIAL

## 2019-04-14 DIAGNOSIS — C53.9 MALIGNANT NEOPLASM OF CERVIX UTERI, UNSPECIFIED: ICD-10-CM

## 2019-04-14 DIAGNOSIS — Z90.49 ACQUIRED ABSENCE OF OTHER SPECIFIED PARTS OF DIGESTIVE TRACT: Chronic | ICD-10-CM

## 2019-04-14 PROCEDURE — 78815 PET IMAGE W/CT SKULL-THIGH: CPT

## 2019-04-14 PROCEDURE — 78815 PET IMAGE W/CT SKULL-THIGH: CPT | Mod: 26,PS

## 2019-04-14 PROCEDURE — A9552: CPT

## 2019-04-18 ENCOUNTER — RESULT REVIEW (OUTPATIENT)
Age: 54
End: 2019-04-18

## 2019-04-18 ENCOUNTER — APPOINTMENT (OUTPATIENT)
Dept: HEMATOLOGY ONCOLOGY | Facility: CLINIC | Age: 54
End: 2019-04-18

## 2019-04-18 VITALS
DIASTOLIC BLOOD PRESSURE: 67 MMHG | TEMPERATURE: 97.9 F | SYSTOLIC BLOOD PRESSURE: 107 MMHG | RESPIRATION RATE: 14 BRPM | OXYGEN SATURATION: 100 % | WEIGHT: 138.89 LBS | HEART RATE: 73 BPM | BODY MASS INDEX: 26.24 KG/M2

## 2019-04-18 NOTE — REVIEW OF SYSTEMS
[Negative] : Allergic/Immunologic [Chills] : no chills [Fever] : no fever [Night Sweats] : no night sweats [Fatigue] : no fatigue [Chest Pain] : no chest pain [Recent Change In Weight] : ~T no recent weight change [Leg Claudication] : no intermittent leg claudication [Lower Ext Edema] : no lower extremity edema [Palpitations] : no palpitations [Shortness Of Breath] : no shortness of breath [Cough] : no cough [Wheezing] : no wheezing [SOB on Exertion] : no shortness of breath during exertion [Abdominal Pain] : no abdominal pain [Vomiting] : no vomiting [Constipation] : no constipation [Dysuria] : no dysuria [Diarrhea] : no diarrhea [Incontinence] : no incontinence [Vaginal Discharge] : no vaginal discharge [Dysmenorrhea/Abn Vaginal Bleeding] : no dysmenorrhea/abnormal vaginal bleeding

## 2019-04-18 NOTE — REVIEW OF SYSTEMS
[Negative] : Heme/Lymph [Chills] : no chills [Fever] : no fever [Night Sweats] : no night sweats [Fatigue] : no fatigue [Chest Pain] : no chest pain [Recent Change In Weight] : ~T no recent weight change [Palpitations] : no palpitations [Lower Ext Edema] : no lower extremity edema [Leg Claudication] : no intermittent leg claudication [Cough] : no cough [Shortness Of Breath] : no shortness of breath [Wheezing] : no wheezing [SOB on Exertion] : no shortness of breath during exertion [Abdominal Pain] : no abdominal pain [Vomiting] : no vomiting [Constipation] : no constipation [Diarrhea] : no diarrhea [Incontinence] : no incontinence [Dysuria] : no dysuria [Vaginal Discharge] : no vaginal discharge [Dysmenorrhea/Abn Vaginal Bleeding] : no dysmenorrhea/abnormal vaginal bleeding

## 2019-04-18 NOTE — REASON FOR VISIT
[Initial Consultation] : an initial consultation [Follow-Up Visit] : a follow-up [[employer] ] : provided by [employer]   [FreeTextEntry1] : [id number] [FreeTextEntry2] : Cervical Cancer

## 2019-04-18 NOTE — ASSESSMENT
[FreeTextEntry1] : Ms. Leggett is a 54 y/o F with h/o DM on Metformin, who was doing well until 8/2018 when patient had menorrhagia. She had continued bleeding, blood clot, abdominal pain and pelvic cramping with pressure until end of 9/2018 for which she presented to the [facility] ED in [place]. Patient states she had USG abdomen which showed mass, after which she was referred to Gynecologist, who referred her to Dr. Carter for further evaluation. Pathology of "tissue from cervix" dated 10/12/18 showed squamous cell carcinoma. On exam by Dr. Carter on 10/18/18, there was a 6 cm firm and friable cervix with possible vaginal involvement at 5:00 and L USL replaced by tumor extending to the side wall, consistent with at least clinical stage IIIB disease. Dr. Carter referred patient to rad/onc for management. Patient was evaluated by Dr. Mcmahan and referred her to medical oncology. Patient had a PET scan which showed no evidence of metastatic disease. \par Patient was planned to start treatment, however was admitted to the hospital with massive per vaginal bleeding, 11/17-11/22/18. She had IR guided embolization, with continued bleeding due to which was started on inpatient radiation on 11/20/18 and Cisplatin on 11/21/18. Patient completed external pelvic radiation on 12/28, 5100cGy . She completed 5 cycles of Cisplatin. Completed brachytherapy on 1/10/19, 2800 cGy. \par  \par 1. Cervical Cancer:\par -Patient with clinical Stage IIIB with left sided pelvic USL extension. PET scan showed no evidence of metastatic disease.\par -Patient completed concurrent radiation. S/p  brachytherapy completion in 1/10/19. \par -PET scan 12 weeks post treatment showed no residual disease. Overall feels good.\par -CMP today. CBC normal.\par -Recommended to continue to f/u with  and .\par \par 2. Iron deficiency anemia:\par Resolved\par -S/P 2 doses of Feraheme. Monitor CBC. \par \par Return in 3 months or sooner as needed for surveillance.\par \par D/W Dr. Langley,  following longitudinally with Dr. Menendez.

## 2019-04-18 NOTE — HISTORY OF PRESENT ILLNESS
[Disease: _____________________] : Disease: [unfilled] [T: ___] : T[unfilled] [M: ___] : M[unfilled] [N: ___] : N[unfilled] [AJCC Stage: ____] : AJCC Stage: [unfilled] [Treatment Protocol] : Treatment Protocol [de-identified] : Gyn: Dr. Emilia Carter/ Dr. Farrell\par Radiation: Dr. Yvonne Mcmahan\par \par History taken from patient. Translated by son, Billy. Patient declined [employer] . \par \par Patient has h/o DM on Metformin, who was doing well until 8/2018 when patient had menorrhagia. She had continued bleeding, blood clot, abdominal pain and pelvic cramping with pressure until end of 9/2018 for which she presented to the [facility] ED in [place]. Patient states she had USG abdomen which showed mass, after which she was referred to Gynecologist who noted a friable cervix on examination s/p biopsy, who referred her to Dr. Carter for further evaluation. Pathology of "tissue from cervix" dated 10/12/18 showed squamous cell carcinoma. On exam by Dr. Carter on 10/18/18, there was a 6 cm firm and friable cervix with minute cervical rim at vaginal margin with possible vaginal involvement at 5:00 and L USL replaced by tumor extending to the side wall/ R USL firm, fixed and smooth, consistent with at least clinical stage IIIB disease. Dr. Carter referred patient to rad/onc for management. Patient was evaluated by Dr. Mcmahan and referred her to medical oncology. She had PET scan done on 1/7/18 with no evidence of metastatic disease.  Patient was planned to start treatment, however was admitted to [facility] with per vaginal bleeding and clots from 11/17-11/22/18.  She had IR embolization, but continued to have bleeding and hence was started on inpatient radiation on 11/20/18. She received C1 Cisplatin in house on 11/21/18.\par Patient completed external pelvic XRT (5100 cGy) on 12/28/18. She had sleeve placed on 12/27. She had ovoid brachytherapy boost to the cervix tumor (2800 cGy in 4 fractions HDR). She completed radiation therapy on 1/10/19. She tolerated treatment well. She did not develop any grade 3 or higher acute toxicity.\par  [de-identified] : Squamous cell carcinoma [FreeTextEntry1] : concurrent chemo/xrt\par C1: Cisplatin 11/21/18- completed [de-identified] : Patient presents for follow-up, no acute complaints. She is feeling well. Denying abdominal or pelvic pain. No bleeding. Appetite has improved.Overall feels good.\par

## 2019-04-18 NOTE — HISTORY OF PRESENT ILLNESS
[Disease: _____________________] : Disease: [unfilled] [T: ___] : T[unfilled] [M: ___] : M[unfilled] [N: ___] : N[unfilled] [AJCC Stage: ____] : AJCC Stage: [unfilled] [Treatment Protocol] : Treatment Protocol [de-identified] : Gyn: Dr. Emilia Carter/ Dr. Farrlel\par Radiation: Dr. Yvonne Mcmahan\par \par History taken from patient. Translated by son, Billy. Patient declined [employer] . \par \par Patient has h/o DM on Metformin, who was doing well until 8/2018 when patient had menorrhagia. She had continued bleeding, blood clot, abdominal pain and pelvic cramping with pressure until end of 9/2018 for which she presented to the [facility] ED in [place]. Patient states she had USG abdomen which showed mass, after which she was referred to Gynecologist who noted a friable cervix on examination s/p biopsy, who referred her to Dr. Carter for further evaluation. Pathology of "tissue from cervix" dated 10/12/18 showed squamous cell carcinoma. On exam by Dr. Carter on 10/18/18, there was a 6 cm firm and friable cervix with minute cervical rim at vaginal margin with possible vaginal involvement at 5:00 and L USL replaced by tumor extending to the side wall/ R USL firm, fixed and smooth, consistent with at least clinical stage IIIB disease. Dr. Carter referred patient to rad/onc for management. Patient was evaluated by Dr. Mcmahan and referred her to medical oncology. She had PET scan done on 1/7/18 with no evidence of metastatic disease.  Patient was planned to start treatment, however was admitted to [facility] with per vaginal bleeding and clots from 11/17-11/22/18.  She had IR embolization, but continued to have bleeding and hence was started on inpatient radiation on 11/20/18. She received C1 Cisplatin in house on 11/21/18.\par Patient completed external pelvic XRT (5100 cGy) on 12/28/18. She had sleeve placed on 12/27. She had ovoid brachytherapy boost to the cervix tumor (2800 cGy in 4 fractions HDR). She completed radiation therapy on 1/10/19. She tolerated treatment well. She did not develop any grade 3 or higher acute toxicity.\par  [de-identified] : Squamous cell carcinoma [FreeTextEntry1] : concurrent chemo/xrt\par C1: Cisplatin 11/21/18- completed [de-identified] : Patient presents for follow-up, no acute complaints. She is feeling well. Denying abdominal or pelvic pain. No bleeding. Appetite has improved.Overall feels good.\par

## 2019-04-18 NOTE — PHYSICAL EXAM
[Restricted in physically strenuous activity but ambulatory and able to carry out work of a light or sedentary nature] : Status 1- Restricted in physically strenuous activity but ambulatory and able to carry out work of a light or sedentary nature, e.g., light house work, office work [Normal] : bilateral breasts without nipple retraction, skin dimpling or palpable masses; the bilateral axillae are without adenopathy

## 2019-04-18 NOTE — ASSESSMENT
[FreeTextEntry1] : Ms. Leggett is a 52 y/o F with h/o DM on Metformin, who was doing well until 8/2018 when patient had menorrhagia. She had continued bleeding, blood clot, abdominal pain and pelvic cramping with pressure until end of 9/2018 for which she presented to the [facility] ED in [place]. Patient states she had USG abdomen which showed mass, after which she was referred to Gynecologist, who referred her to Dr. Carter for further evaluation. Pathology of "tissue from cervix" dated 10/12/18 showed squamous cell carcinoma. On exam by Dr. Carter on 10/18/18, there was a 6 cm firm and friable cervix with possible vaginal involvement at 5:00 and L USL replaced by tumor extending to the side wall, consistent with at least clinical stage IIIB disease. Dr. Carter referred patient to rad/onc for management. Patient was evaluated by Dr. Mcmahan and referred her to medical oncology. Patient had a PET scan which showed no evidence of metastatic disease. \par Patient was planned to start treatment, however was admitted to the hospital with massive per vaginal bleeding, 11/17-11/22/18. She had IR guided embolization, with continued bleeding due to which was started on inpatient radiation on 11/20/18 and Cisplatin on 11/21/18. Patient completed external pelvic radiation on 12/28, 5100cGy . She completed 5 cycles of Cisplatin. Completed brachytherapy on 1/10/19, 2800 cGy. \par  \par 1. Cervical Cancer:\par -Patient with clinical Stage IIIB with left sided pelvic USL extension. PET scan showed no evidence of metastatic disease.\par -Patient completed concurrent radiation. S/p  brachytherapy completion in 1/10/19. \par -PET scan 12 weeks post treatment showed no residual disease. Overall feels good.\par -CMP today. CBC normal.\par -Recommended to continue to f/u with  and .\par \par 2. Iron deficiency anemia:\par Resolved\par -S/P 2 doses of Feraheme. Monitor CBC. \par \par Return in 3 months or sooner as needed for surveillance.\par \par D/W Dr. Langley,  following longitudinally with Dr. Menendez.

## 2019-04-18 NOTE — REASON FOR VISIT
[Initial Consultation] : an initial consultation [Follow-Up Visit] : a follow-up [[employer] ] : provided by [employer]   [FreeTextEntry2] : Cervical Cancer [FreeTextEntry1] : [id number]

## 2019-06-12 ENCOUNTER — OUTPATIENT (OUTPATIENT)
Dept: OUTPATIENT SERVICES | Facility: HOSPITAL | Age: 54
LOS: 1 days | Discharge: HOME | End: 2019-06-12

## 2019-06-12 DIAGNOSIS — C53.9 MALIGNANT NEOPLASM OF CERVIX UTERI, UNSPECIFIED: ICD-10-CM

## 2019-06-12 DIAGNOSIS — D50.0 IRON DEFICIENCY ANEMIA SECONDARY TO BLOOD LOSS (CHRONIC): ICD-10-CM

## 2019-06-12 DIAGNOSIS — Z90.49 ACQUIRED ABSENCE OF OTHER SPECIFIED PARTS OF DIGESTIVE TRACT: Chronic | ICD-10-CM

## 2019-06-13 ENCOUNTER — APPOINTMENT (OUTPATIENT)
Dept: HEMATOLOGY ONCOLOGY | Facility: CLINIC | Age: 54
End: 2019-06-13

## 2019-06-13 ENCOUNTER — RESULT REVIEW (OUTPATIENT)
Age: 54
End: 2019-06-13

## 2019-06-13 VITALS
RESPIRATION RATE: 16 BRPM | TEMPERATURE: 98.7 F | BODY MASS INDEX: 26.83 KG/M2 | OXYGEN SATURATION: 98 % | SYSTOLIC BLOOD PRESSURE: 104 MMHG | DIASTOLIC BLOOD PRESSURE: 65 MMHG | HEART RATE: 88 BPM | WEIGHT: 142 LBS

## 2019-06-13 RX ORDER — METFORMIN HYDROCHLORIDE 500 MG/1
500 TABLET, COATED ORAL
Qty: 60 | Refills: 0 | Status: ACTIVE | COMMUNITY

## 2019-06-13 RX ORDER — BLOOD-GLUCOSE METER
W/DEVICE KIT MISCELLANEOUS
Qty: 1 | Refills: 0 | Status: COMPLETED | COMMUNITY
Start: 2018-10-18 | End: 2019-06-13

## 2019-06-13 RX ORDER — BLOOD SUGAR DIAGNOSTIC
STRIP MISCELLANEOUS
Qty: 100 | Refills: 0 | Status: COMPLETED | COMMUNITY
Start: 2018-10-18 | End: 2019-06-13

## 2019-06-13 RX ORDER — ASCORBIC ACID 500 MG
500 TABLET ORAL DAILY
Qty: 60 | Refills: 2 | Status: DISCONTINUED | COMMUNITY
Start: 2018-11-12 | End: 2019-06-13

## 2019-06-13 RX ORDER — ACETAMINOPHEN 325 MG/1
TABLET, FILM COATED ORAL
Refills: 0 | Status: COMPLETED | COMMUNITY
End: 2019-06-13

## 2019-06-13 RX ORDER — DEXTROMETHORPHAN AND GUAIFENESIN 10; 100 MG/5ML; MG/5ML
100-10 SOLUTION ORAL
Qty: 240 | Refills: 0 | Status: COMPLETED | COMMUNITY
Start: 2018-09-25 | End: 2019-06-13

## 2019-06-13 RX ORDER — FAMOTIDINE 20 MG/1
20 TABLET, FILM COATED ORAL
Qty: 60 | Refills: 2 | Status: COMPLETED | COMMUNITY
Start: 2018-12-31 | End: 2019-06-13

## 2019-07-03 ENCOUNTER — APPOINTMENT (OUTPATIENT)
Dept: RADIATION ONCOLOGY | Facility: CLINIC | Age: 54
End: 2019-07-03
Payer: MEDICAID

## 2019-07-03 VITALS
RESPIRATION RATE: 16 BRPM | SYSTOLIC BLOOD PRESSURE: 118 MMHG | HEART RATE: 62 BPM | HEIGHT: 61 IN | DIASTOLIC BLOOD PRESSURE: 77 MMHG | TEMPERATURE: 97.34 F | OXYGEN SATURATION: 100 % | BODY MASS INDEX: 27.95 KG/M2 | WEIGHT: 148.04 LBS

## 2019-07-03 PROCEDURE — 99213 OFFICE O/P EST LOW 20 MIN: CPT

## 2019-07-03 NOTE — VITALS
[90: Able to carry normal activity; minor signs or symptoms of disease.] : 90: Able to carry normal activity; minor signs or symptoms of disease.  [Maximal Pain Intensity: 0/10] : 0/10 [Least Pain Intensity: 0/10] : 0/10

## 2019-07-03 NOTE — REVIEW OF SYSTEMS
[Constipation: Grade 0] : Constipation: Grade 0 [Diarrhea: Grade 0] : Diarrhea: Grade 0 [Hematuria: Grade 0] : Hematuria: Grade 0 [Fatigue: Grade 0] : Fatigue: Grade 0 [Urinary Incontinence: Grade 0] : Urinary Incontinence: Grade 0  [Urinary Retention: Grade 0] : Urinary Retention: Grade 0 [Urinary Tract Pain: Grade 0] : Urinary Tract Pain: Grade 0 [Urinary Frequency: Grade 0] : Urinary Frequency: Grade 0 [Vaginal Stricture: Grade 0] : Vaginal Stricture: Grade 0 [Dermatitis Radiation: Grade 0] : Dermatitis Radiation: Grade 0

## 2019-07-08 ENCOUNTER — LABORATORY RESULT (OUTPATIENT)
Age: 54
End: 2019-07-08

## 2019-07-08 ENCOUNTER — APPOINTMENT (OUTPATIENT)
Dept: GYNECOLOGIC ONCOLOGY | Facility: CLINIC | Age: 54
End: 2019-07-08
Payer: MEDICAID

## 2019-07-08 VITALS
WEIGHT: 145 LBS | SYSTOLIC BLOOD PRESSURE: 117 MMHG | DIASTOLIC BLOOD PRESSURE: 69 MMHG | HEIGHT: 61 IN | BODY MASS INDEX: 27.38 KG/M2

## 2019-07-08 PROCEDURE — 99214 OFFICE O/P EST MOD 30 MIN: CPT

## 2019-07-11 LAB — CYTOLOGY CVX/VAG DOC THIN PREP: ABNORMAL

## 2019-07-12 NOTE — REASON FOR VISIT
[Other: ___] : [unfilled] [Routine Follow-Up] : routine follow-up visit for [FreeTextEntry1] : [id number] [FreeTextEntry2] : [place]

## 2019-07-12 NOTE — HISTORY OF PRESENT ILLNESS
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 She was treated with concurrent chemotherapy and pelvic external beam radiation and tandem and ovoid brachytherapy boost to the cervix tumor (2800 cGy in 4 fractions HDR). She completed radiation therapy on 1/10/19. She tolerated treatment well. She did not develop any grade 3 or higher acute toxicity.\par \par PET/CT 4/14/19  demonstrated resolution of hypermetabolic cervical mass and hypermetabolism in lower uterine segment as compared to prior study dated 11/7/2018, compatible with response to interval therapy. A new small focus of mild FDG activity in the \par endometrium is nonspecific. Resolution of nonspecific diffuse splenic and bone marrow hypermetabolism.  Resolution of nonspecific pancolonic hypermetabolism. \par \par She saw Dr. Farrell in April  and Dr. Jalloh in June.\par \par [facility]  # [id number]\par Today she states that she feels well.  She is eating well.  Denies GI/ symptoms or vaginal bleeding.  She states that she has been using her vaginal dilator every 3 days and saw her PMD recently.  She is not sexually active at this time.\par \par \par

## 2019-07-12 NOTE — PHYSICAL EXAM
[Heart Rate And Rhythm] : heart rate and rhythm were normal [Heart Sounds] : normal S1 and S2 [Bowel Sounds] : normal bowel sounds [Abdomen Soft] : soft [Cervical Lymph Nodes Enlarged Anterior Bilaterally] : anterior cervical [Cervical Lymph Nodes Enlarged Posterior Bilaterally] : posterior cervical [Axillary Lymph Nodes Enlarged Bilaterally] : axillary [Supraclavicular Lymph Nodes Enlarged Bilaterally] : supraclavicular [Femoral Lymph Nodes Enlarged Bilaterally] : femoral [Inguinal Lymph Nodes Enlarged Bilaterally] : inguinal [Oriented To Time, Place, And Person] : oriented to person, place, and time [Skin Color & Pigmentation] : normal skin color and pigmentation [General Appearance - Well Developed] : well developed [General Appearance - Well Nourished] : well nourished [General Appearance - In No Acute Distress] : in no acute distress [Auscultation Breath Sounds / Voice Sounds] : lungs were clear to auscultation bilaterally [Abdomen Tenderness] : non-tender [] : no hepato-splenomegaly [Normal External Genitalia] : normal external genitalia  [Normal Vagina] : normal vagina without lesions or masses [Normal Cervix] : normal cervix without masses [No Focal Deficits] : no focal deficits [Normal] : normal spine exam without palpable tenderness, no kyphosis or scoliosis [de-identified] : no cervix lesions noted

## 2019-11-18 ENCOUNTER — LABORATORY RESULT (OUTPATIENT)
Age: 54
End: 2019-11-18

## 2019-11-18 ENCOUNTER — APPOINTMENT (OUTPATIENT)
Dept: GYNECOLOGIC ONCOLOGY | Facility: CLINIC | Age: 54
End: 2019-11-18
Payer: MEDICAID

## 2019-11-18 VITALS
WEIGHT: 152 LBS | SYSTOLIC BLOOD PRESSURE: 108 MMHG | HEART RATE: 82 BPM | HEIGHT: 61 IN | DIASTOLIC BLOOD PRESSURE: 74 MMHG | BODY MASS INDEX: 28.7 KG/M2

## 2019-11-18 PROCEDURE — 99213 OFFICE O/P EST LOW 20 MIN: CPT

## 2019-11-18 NOTE — REVIEW OF SYSTEMS
[Negative] : Allergic/Immunologic [Fever] : no fever [Chills] : no chills [Night Sweats] : no night sweats [Fatigue] : no fatigue [Chest Pain] : no chest pain [Recent Change In Weight] : ~T no recent weight change [Leg Claudication] : no intermittent leg claudication [Palpitations] : no palpitations [Lower Ext Edema] : no lower extremity edema [Shortness Of Breath] : no shortness of breath [SOB on Exertion] : no shortness of breath during exertion [Wheezing] : no wheezing [Cough] : no cough [Abdominal Pain] : no abdominal pain [Vomiting] : no vomiting [Diarrhea] : no diarrhea [Constipation] : no constipation [Incontinence] : no incontinence [Dysuria] : no dysuria [Vaginal Discharge] : no vaginal discharge [Dysmenorrhea/Abn Vaginal Bleeding] : no dysmenorrhea/abnormal vaginal bleeding

## 2019-11-18 NOTE — ASSESSMENT
[FreeTextEntry1] : Ms. Leggett is a 54 y/o F with h/o DM on Metformin, who was doing well until 8/2018 when patient had menorrhagia. She had continued bleeding, blood clot, abdominal pain and pelvic cramping with pressure until end of 9/2018 for which she presented to the [facility] ED in [place]. Patient states she had USG abdomen which showed mass, after which she was referred to Gynecologist, who referred her to Dr. Carter for further evaluation. Pathology of "tissue from cervix" dated 10/12/18 showed squamous cell carcinoma. On exam by Dr. Carter on 10/18/18, there was a 6 cm firm and friable cervix with possible vaginal involvement at 5:00 and L USL replaced by tumor extending to the side wall, consistent with at least clinical stage IIIB disease. Dr. Carter referred patient to rad/onc for management. Patient was evaluated by Dr. Mcmahan and referred her to medical oncology. Patient had a PET scan which showed no evidence of metastatic disease. \par Patient was planned to start treatment, however was admitted to the hospital with massive per vaginal bleeding, 11/17-11/22/18. She had IR guided embolization, with continued bleeding due to which was started on inpatient radiation on 11/20/18 and Cisplatin on 11/21/18. Patient completed external pelvic radiation on 12/28, 5100cGy . She completed 5 cycles of Cisplatin. Completed brachytherapy on 1/10/19, 2800 cGy. \par  \par 1. Cervical Cancer:\par -Patient with clinical Stage IIIB with left sided pelvic USL extension. PET scan showed no evidence of metastatic disease. Patient completed concurrent radiation. S/p  brachytherapy completion in 1/10/19. \par -PET scan 12 weeks post treatment showed no residual disease. Overall feels good.\par -CMP, CMP today.\par -Will continue with routine surveillance per NCCN guideline with H/P, examination and blood work every 3 months. Should she develop any symptoms will get scans. \par -Recommended to continue to f/u with  and .\par \par 2. Iron deficiency anemia:\par Resolved\par -S/P 2 doses of Feraheme. Monitor CBC. \par \par Return in 3 months or sooner as needed for surveillance. Patient aware that she'll be called for her next appointment. Her information was provided to the team. \par \par D/W Dr. Small,  following longitudinally with Dr. Menendez.

## 2019-11-18 NOTE — HISTORY OF PRESENT ILLNESS
[Disease: _____________________] : Disease: [unfilled] [N: ___] : N[unfilled] [T: ___] : T[unfilled] [AJCC Stage: ____] : AJCC Stage: [unfilled] [M: ___] : M[unfilled] [Treatment Protocol] : Treatment Protocol [de-identified] : Gyn: Dr. Emilia Carter/ Dr. Farrell\par Radiation: Dr. Yvonne Mcmahan\par \par History taken from patient. Translated by son, Billy. Patient declined [employer] . \par \par Patient has h/o DM on Metformin, who was doing well until 8/2018 when patient had menorrhagia. She had continued bleeding, blood clot, abdominal pain and pelvic cramping with pressure until end of 9/2018 for which she presented to the [facility] ED in [place]. Patient states she had USG abdomen which showed mass, after which she was referred to Gynecologist who noted a friable cervix on examination s/p biopsy, who referred her to Dr. Carter for further evaluation. Pathology of "tissue from cervix" dated 10/12/18 showed squamous cell carcinoma. On exam by Dr. Carter on 10/18/18, there was a 6 cm firm and friable cervix with minute cervical rim at vaginal margin with possible vaginal involvement at 5:00 and L USL replaced by tumor extending to the side wall/ R USL firm, fixed and smooth, consistent with at least clinical stage IIIB disease. Dr. Carter referred patient to rad/onc for management. Patient was evaluated by Dr. Mcmahan and referred her to medical oncology. She had PET scan done on 1/7/18 with no evidence of metastatic disease.  Patient was planned to start treatment, however was admitted to [facility] with per vaginal bleeding and clots from 11/17-11/22/18.  She had IR embolization, but continued to have bleeding and hence was started on inpatient radiation on 11/20/18. She received C1 Cisplatin in house on 11/21/18.\par Patient completed external pelvic XRT (5100 cGy) on 12/28/18. She had sleeve placed on 12/27. She had ovoid brachytherapy boost to the cervix tumor (2800 cGy in 4 fractions HDR). She completed radiation therapy on 1/10/19. She tolerated treatment well. She did not develop any grade 3 or higher acute toxicity. PET scan done 12 weeks after the completion showed complete resolution of the mass. \par  [FreeTextEntry1] : concurrent chemo/xrt\par C1: Cisplatin 11/21/18- completed [de-identified] : Squamous cell carcinoma [de-identified] : Patient presents for follow-up, no acute complaints. She is feeling well. Denying abdominal or pelvic pain. No bleeding. Appetite has improved.Overall feels good.\par

## 2019-11-26 LAB — CYTOLOGY CVX/VAG DOC THIN PREP: ABNORMAL

## 2020-01-08 ENCOUNTER — APPOINTMENT (OUTPATIENT)
Dept: RADIATION ONCOLOGY | Facility: CLINIC | Age: 55
End: 2020-01-08
Payer: MEDICAID

## 2020-01-08 VITALS
OXYGEN SATURATION: 100 % | WEIGHT: 157.3 LBS | RESPIRATION RATE: 18 BRPM | TEMPERATURE: 98.8 F | BODY MASS INDEX: 29.7 KG/M2 | HEART RATE: 82 BPM | SYSTOLIC BLOOD PRESSURE: 101 MMHG | HEIGHT: 61 IN | DIASTOLIC BLOOD PRESSURE: 54 MMHG

## 2020-01-08 PROCEDURE — 99214 OFFICE O/P EST MOD 30 MIN: CPT

## 2020-02-17 NOTE — PHYSICAL EXAM
[General Appearance - In No Acute Distress] : in no acute distress [General Appearance - Alert] : alert [Skin Color & Pigmentation] : normal skin color and pigmentation [Heart Sounds] : normal S1 and S2 [Oriented To Time, Place, And Person] : oriented to person, place, and time [Sclera] : the sclera and conjunctiva were normal [Auscultation Breath Sounds / Voice Sounds] : lungs were clear to auscultation bilaterally [Abdomen Soft] : soft [Abdomen Tenderness] : non-tender [] : no hepato-splenomegaly [Normal External Genitalia] : normal external genitalia  [Normal Vagina] : normal vagina without lesions or masses [Normal] : no palpable adenopathy [Motor Tone] : muscle strength and tone were normal [No Focal Deficits] : no focal deficits [de-identified] : no lesions noted in cervix which is flushed with vagina.

## 2020-02-17 NOTE — HISTORY OF PRESENT ILLNESS
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 She was treated with concurrent chemotherapy and pelvic external beam radiation and tandem and ovoids brachytherapy boost to the cervix tumor. She completed radiation therapy on 1/10/19. She tolerated treatment well.\par \par She saw Dr. Farrell on 11/18/19. PAP done on same day showed atypical cell abnormality.\par \par [place]  # [id number]\par Today she feels well.  She denies vaginal discharge, vaginal bleeding, dyspareunia, dysuria, rectal bleeding, or changes in her urinary or bowel habits.  She uses vaginal dilator 3 x a week. Notes soft tender palpable mass to  b/l lateral antecubital area, x 2-3 weeks.  She has an appointment to see Dr. Farrell on 02/24/20.\par \par

## 2020-02-17 NOTE — REASON FOR VISIT
[Routine Follow-Up] : routine follow-up visit for [Other: ___] : [unfilled] [FreeTextEntry1] : [id number] [FreeTextEntry2] : [place]one [TWNoteComboBox1] : Micron[place]

## 2020-02-24 ENCOUNTER — APPOINTMENT (OUTPATIENT)
Dept: GYNECOLOGIC ONCOLOGY | Facility: CLINIC | Age: 55
End: 2020-02-24
Payer: MEDICAID

## 2020-02-24 VITALS
WEIGHT: 160 LBS | HEART RATE: 76 BPM | HEIGHT: 61 IN | SYSTOLIC BLOOD PRESSURE: 136 MMHG | BODY MASS INDEX: 30.21 KG/M2 | DIASTOLIC BLOOD PRESSURE: 83 MMHG

## 2020-02-24 PROCEDURE — 99213 OFFICE O/P EST LOW 20 MIN: CPT

## 2020-02-24 NOTE — HISTORY OF PRESENT ILLNESS
[FreeTextEntry1] : Stage IIIB Cx Ca (SCC)\par Rad Onc - Dr Mcmahan\par Med Onc Dr ISMAEL Jalloh\par PCP: Dr ISMAEL Finney, [address], [address], [place], [address]\par \par She RTO following completion of her EBRT and Brachytherapy. Patient completed external pelvic XRT (5100 cGy) on 12/28/18. She had sleeve placed on 12/27. She had ovoid brachytherapy boost to the cervix tumor (2800 cGy in 4 fractions HDR). She completed radiation therapy on 1/10/19.\par \par ROS: noncontributory. She confirms no VB or VD. No GI or  concerns. She reports seeing her PCP for elbow/arm pain and was told of arthritis. \par \par Pap: Jul 2019 ASCUS, Pos HR-HPV. Nov 2019-ASCUS, Pos HPV\par \par PET: Apr 2019 - resolution of avidity. \par \par Markers: Values reviewed in RESULTS.\par \par Med Onc and Rad Onc notes reviewed. \par \par HM:\par I have emphasized the pt return to the PCP for gen issues, and we disc BHM.

## 2020-02-24 NOTE — DISCUSSION/SUMMARY
[Reviewed Clinical Lab Test(s)] : Results of clinical tests were reviewed. [Reviewed Radiology Report(s)] : Radiology reports were reviewed. [FreeTextEntry1] : I met with the pt. (BV present throughout)\par -We discussed her status. We await the Pap from today. \par -I encouraged her to also f/u with RO and MO. RO note reviewed. \par -I advised her to f/u with her PCP for gen issues, and she confirms a breast eval by the PCP, incl CBE.   \par  -Her instructions were reviewed.\par  -All Q/A. \par -She will RTO in .

## 2020-02-24 NOTE — PHYSICAL EXAM
[Abnormal] : Examination of extremities for edema and/or varicosities: Abnormal [Absent] : CVAT: absent [Normal] : Recto-Vaginal Exam: Normal [de-identified] : 1+ edema [de-identified] : RT change [de-identified] : NGL, essentiall flush with vagina [de-identified] : The fundus was nonpalpable [de-identified] : The adnexae were nonpalapble [de-identified] : Smoothly indurated

## 2020-02-26 LAB — HPV HIGH+LOW RISK DNA PNL CVX: DETECTED

## 2020-03-01 LAB — CYTOLOGY CVX/VAG DOC THIN PREP: ABNORMAL

## 2020-06-05 ENCOUNTER — APPOINTMENT (OUTPATIENT)
Dept: GYNECOLOGIC ONCOLOGY | Facility: CLINIC | Age: 55
End: 2020-06-05
Payer: MEDICAID

## 2020-06-05 VITALS
HEART RATE: 74 BPM | BODY MASS INDEX: 29.67 KG/M2 | SYSTOLIC BLOOD PRESSURE: 120 MMHG | WEIGHT: 157 LBS | DIASTOLIC BLOOD PRESSURE: 77 MMHG

## 2020-06-05 PROCEDURE — 99213 OFFICE O/P EST LOW 20 MIN: CPT

## 2020-06-10 LAB — CYTOLOGY CVX/VAG DOC THIN PREP: ABNORMAL

## 2020-07-08 ENCOUNTER — APPOINTMENT (OUTPATIENT)
Dept: RADIATION ONCOLOGY | Facility: CLINIC | Age: 55
End: 2020-07-08

## 2020-08-13 ENCOUNTER — APPOINTMENT (OUTPATIENT)
Dept: RADIATION ONCOLOGY | Facility: CLINIC | Age: 55
End: 2020-08-13
Payer: MEDICAID

## 2020-08-13 VITALS
RESPIRATION RATE: 18 BRPM | HEIGHT: 61 IN | DIASTOLIC BLOOD PRESSURE: 79 MMHG | OXYGEN SATURATION: 98 % | HEART RATE: 74 BPM | WEIGHT: 159.17 LBS | SYSTOLIC BLOOD PRESSURE: 125 MMHG | TEMPERATURE: 97.7 F | BODY MASS INDEX: 30.05 KG/M2

## 2020-08-13 PROCEDURE — 99212 OFFICE O/P EST SF 10 MIN: CPT | Mod: GC

## 2020-08-13 NOTE — REVIEW OF SYSTEMS
[Negative] : Psychiatric [Dyspareunia: Grade 0] : Dyspareunia: Grade 0 [Constipation: Grade 0] : Constipation: Grade 0 [Diarrhea: Grade 0] : Diarrhea: Grade 0 [Fatigue: Grade 0] : Fatigue: Grade 0 [Urinary Incontinence: Grade 0] : Urinary Incontinence: Grade 0  [Urinary Retention: Grade 0] : Urinary Retention: Grade 0 [Urinary Tract Pain: Grade 0] : Urinary Tract Pain: Grade 0 [Urinary Urgency: Grade 0] : Urinary Urgency: Grade 0 [Urinary Frequency: Grade 0] : Urinary Frequency: Grade 0

## 2020-08-18 NOTE — HISTORY OF PRESENT ILLNESS
[FreeTextEntry1] : Ms. 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 She was treated with concurrent chemotherapy and pelvic external beam radiation and tandem and ovoids brachytherapy boost to the cervix tumor. She completed radiation therapy on 1/10/19. She tolerated treatment well.\par \par Today she feels well.  Denies any pain. No urinary or bowel complaints. No vaginal bleeding or discharge. She uses vaginal dilator 3 x a week. She is sexually active with no dyspareunia. Continues to follow up with Dr. aFrrell, last seen 6/5/2020\par PET: 4/2019 ABEL\par HPV 2/26/20MRNA Alpha dtected\par Pap 6/10/20: NEG

## 2020-08-18 NOTE — HISTORY OF PRESENT ILLNESS
[FreeTextEntry1] : Ms. 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 She was treated with concurrent chemotherapy and pelvic external beam radiation and tandem and ovoids brachytherapy boost to the cervix tumor. She completed radiation therapy on 1/10/19. She tolerated treatment well.\par \par Today she feels well.  Denies any pain. No urinary or bowel complaints. No vaginal bleeding or discharge. She uses vaginal dilator 3 x a week. She is sexually active with no dyspareunia. Continues to follow up with Dr. Farrell, last seen 6/5/2020\par PET: 4/2019 ABEL\par HPV 2/26/20MRNA Alpha dtected\par Pap 6/10/20: NEG

## 2020-08-18 NOTE — REASON FOR VISIT
[Other: ___] : [unfilled] [Routine Follow-Up] : routine follow-up visit for [[employer] ] : provided by [employer]   [FreeTextEntry1] : [id number] [FreeTextEntry2] : Doming[place] [TWNoteComboBox1] : Japanese

## 2020-08-18 NOTE — PHYSICAL EXAM
[General Appearance - Alert] : alert [General Appearance - In No Acute Distress] : in no acute distress [Sclera] : the sclera and conjunctiva were normal [Auscultation Breath Sounds / Voice Sounds] : lungs were clear to auscultation bilaterally [Heart Sounds] : normal S1 and S2 [Abdomen Tenderness] : non-tender [Abdomen Soft] : soft [] : no hepato-splenomegaly [Normal External Genitalia] : normal external genitalia  [Normal Vagina] : normal vagina without lesions or masses [Normal] : no palpable adenopathy [Motor Tone] : muscle strength and tone were normal [No Focal Deficits] : no focal deficits [Skin Color & Pigmentation] : normal skin color and pigmentation [Oriented To Time, Place, And Person] : oriented to person, place, and time [de-identified] : No lesions noted.

## 2020-08-18 NOTE — PHYSICAL EXAM
[General Appearance - Alert] : alert [General Appearance - In No Acute Distress] : in no acute distress [Sclera] : the sclera and conjunctiva were normal [Auscultation Breath Sounds / Voice Sounds] : lungs were clear to auscultation bilaterally [Heart Sounds] : normal S1 and S2 [Abdomen Soft] : soft [Abdomen Tenderness] : non-tender [] : no hepato-splenomegaly [Normal External Genitalia] : normal external genitalia  [Normal Vagina] : normal vagina without lesions or masses [Normal] : no palpable adenopathy [Motor Tone] : muscle strength and tone were normal [Skin Color & Pigmentation] : normal skin color and pigmentation [No Focal Deficits] : no focal deficits [Oriented To Time, Place, And Person] : oriented to person, place, and time [de-identified] : No lesions noted.

## 2020-08-18 NOTE — REASON FOR VISIT
[Other: ___] : [unfilled] [Routine Follow-Up] : routine follow-up visit for [[employer] ] : provided by [employer]   [FreeTextEntry1] : [id number] [FreeTextEntry2] : Dominga [TWNoteComboBox1] : [place]kin[place]

## 2020-09-14 NOTE — ASU PREOP CHECKLIST - TO WHOM
SUBJECTIVE: 13 year old female complains of warts.   They have been present for 1 year(s).    OBJECTIVE: 1 wart(s) on foot and 4 molluscums lesions on knee noted    ASSESSMENT: Warts (Verruca Vulgaris)    PLAN: The viral etiology and natural history has been discussed.   Various treatment methods, side effects and failure rates have been   discussed.  A choice of liquid nitrogen was made, and the expected   blistering or scabbing reaction explained.  Liquid nitrogen was   applied to 1 wart(s) and 4 molluscum lesions;  the patient will return at 2-4 week intervals   for retreatments as needed.      Ewa Drake RN/ Luisa Valle RN
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
## 2020-12-04 ENCOUNTER — APPOINTMENT (OUTPATIENT)
Dept: GYNECOLOGIC ONCOLOGY | Facility: CLINIC | Age: 55
End: 2020-12-04
Payer: MEDICAID

## 2020-12-04 VITALS
HEART RATE: 78 BPM | WEIGHT: 153 LBS | TEMPERATURE: 98.3 F | SYSTOLIC BLOOD PRESSURE: 119 MMHG | BODY MASS INDEX: 28.91 KG/M2 | DIASTOLIC BLOOD PRESSURE: 75 MMHG

## 2020-12-04 PROCEDURE — 99072 ADDL SUPL MATRL&STAF TM PHE: CPT

## 2020-12-04 PROCEDURE — 99214 OFFICE O/P EST MOD 30 MIN: CPT

## 2020-12-14 LAB — CYTOLOGY CVX/VAG DOC THIN PREP: ABNORMAL

## 2021-01-29 ENCOUNTER — APPOINTMENT (OUTPATIENT)
Dept: RADIATION ONCOLOGY | Facility: CLINIC | Age: 56
End: 2021-01-29
Payer: MEDICAID

## 2021-01-29 VITALS
BODY MASS INDEX: 28.64 KG/M2 | OXYGEN SATURATION: 100 % | WEIGHT: 151.57 LBS | HEART RATE: 66 BPM | DIASTOLIC BLOOD PRESSURE: 83 MMHG | SYSTOLIC BLOOD PRESSURE: 125 MMHG | RESPIRATION RATE: 16 BRPM

## 2021-01-29 PROCEDURE — 99072 ADDL SUPL MATRL&STAF TM PHE: CPT

## 2021-01-29 PROCEDURE — 99212 OFFICE O/P EST SF 10 MIN: CPT

## 2021-01-29 NOTE — HISTORY OF PRESENT ILLNESS
[FreeTextEntry1] : Ms. 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 She was treated with concurrent chemotherapy and pelvic external beam radiation and tandem and ovoids brachytherapy boost to the cervix tumor. She completed radiation therapy on 1/10/19. She tolerated treatment well.\par \par Today she feels well.  Denies any pain. No urinary or bowel complaints. No vaginal bleeding or discharge. She is sexually active with no dyspareunia. Continues to follow up with Dr. Farrell\par \par PET: 4/2019 ABEL\par HPV 2/26/20 MRNA Alpha detected\par Pap 12/14/20: NEG

## 2021-01-29 NOTE — REVIEW OF SYSTEMS
[Negative] : Psychiatric [Constipation: Grade 0] : Constipation: Grade 0 [Diarrhea: Grade 0] : Diarrhea: Grade 0 [Fatigue: Grade 0] : Fatigue: Grade 0 [Urinary Incontinence: Grade 0] : Urinary Incontinence: Grade 0  [Urinary Retention: Grade 0] : Urinary Retention: Grade 0 [Urinary Tract Pain: Grade 0] : Urinary Tract Pain: Grade 0 [Urinary Urgency: Grade 0] : Urinary Urgency: Grade 0 [Urinary Frequency: Grade 0] : Urinary Frequency: Grade 0 [Dyspareunia: Grade 0] : Dyspareunia: Grade 0

## 2021-01-29 NOTE — REASON FOR VISIT
[Routine Follow-Up] : routine follow-up visit for [Other: ___] : [unfilled] [[employer] ] : provided by [employer]   [FreeTextEntry1] : [id number] [TWNoteComboBox1] : [place]

## 2021-01-29 NOTE — PHYSICAL EXAM
[General Appearance - In No Acute Distress] : in no acute distress [] : no respiratory distress [Abdomen Soft] : soft [Nondistended] : nondistended [No Rectal Mass] : no rectal mass [Normal External Genitalia] : normal external genitalia  [Supraclavicular Lymph Nodes Enlarged Bilaterally] : supraclavicular [Inguinal Lymph Nodes Enlarged Bilaterally] : inguinal [de-identified] : Cervix retracted into vaginal apex os just visible

## 2021-03-01 PROCEDURE — G9005: CPT

## 2021-04-28 ENCOUNTER — NON-APPOINTMENT (OUTPATIENT)
Age: 56
End: 2021-04-28

## 2021-07-23 ENCOUNTER — APPOINTMENT (OUTPATIENT)
Dept: GYNECOLOGIC ONCOLOGY | Facility: CLINIC | Age: 56
End: 2021-07-23
Payer: MEDICAID

## 2021-07-23 ENCOUNTER — APPOINTMENT (OUTPATIENT)
Dept: RADIATION ONCOLOGY | Facility: CLINIC | Age: 56
End: 2021-07-23
Payer: MEDICAID

## 2021-07-23 DIAGNOSIS — B97.7 PAPILLOMAVIRUS AS THE CAUSE OF DISEASES CLASSIFIED ELSEWHERE: ICD-10-CM

## 2021-07-23 PROCEDURE — 99072 ADDL SUPL MATRL&STAF TM PHE: CPT

## 2021-07-23 PROCEDURE — 99212 OFFICE O/P EST SF 10 MIN: CPT

## 2021-07-23 PROCEDURE — 99214 OFFICE O/P EST MOD 30 MIN: CPT

## 2021-07-26 LAB — HPV HIGH+LOW RISK DNA PNL CVX: DETECTED

## 2021-07-26 NOTE — HISTORY OF PRESENT ILLNESS
[FreeTextEntry1] : Ms. 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 She completed radiation therapy on 1/10/19. She tolerated treatment well.\par Today she feels well. Denies any pain. No urinary or bowel complaints. No vaginal bleeding or discharge. She is sexually active with no dyspareunia. Continues to follow up with Dr. Farrell. \par \par PET: 4/2019 ABEL\par HPV 2/26/20 MRNA Alpha detected\par Pap 12/14/20: NEG

## 2021-07-26 NOTE — PHYSICAL EXAM
[General Appearance - In No Acute Distress] : in no acute distress [] : no respiratory distress [No Rectal Mass] : no rectal mass [Normal External Genitalia] : normal external genitalia  [FreeTextEntry1] : per Dr. Nielsen [de-identified] : shoretened vaginal canal with cervix retracted into vaginal apexos visualized

## 2021-07-26 NOTE — REVIEW OF SYSTEMS
[Anal Pain: Grade 0] : Anal Pain: Grade 0 [Constipation: Grade 0] : Constipation: Grade 0 [Diarrhea: Grade 0] : Diarrhea: Grade 0 [Fatigue: Grade 0] : Fatigue: Grade 0 [Hematuria: Grade 0] : Hematuria: Grade 0

## 2021-07-31 LAB — CYTOLOGY CVX/VAG DOC THIN PREP: ABNORMAL

## 2021-10-21 NOTE — ED ADULT NURSE NOTE - NS TRANSFER PATIENT BELONGINGS
Impression: Age-related nuclear cataract, left eye Plan: Has some visual significance but pt elects to observe. Patient will monitor vision changes and contact us with any decrease in vision, will re-evaluate cataract on return visit. Current monovision.
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 Plan: Discs and IOPs stable remain stable. Continue Timolol QAM OU, refills sent. OCT RNFL normal OU. Photos today stable OU. Last HVF normal OU. RTC 6 mo IOP check c possible HVF 24-2.
Impression: Vitreous degeneration, right eye Plan: There is no evidence of retinal pathology. All symptoms of retinal detachment or tears were discussed in detail. If pt. notices any symptoms discussed, contact office immediately. Recommend pt. return for normal recall.
Clothing

## 2021-12-03 NOTE — ASU DISCHARGE PLAN (ADULT/PEDIATRIC). - "IF YOU OR YOUR GUARDIAN/FAMILY IS A SMOKER, IT IS IMPORTANT FOR YOUR HEALTH TO STOP SMOKING. PLEASE BE AWARE THAT SECOND HAND SMOKE IS ALSO HARMFUL."
Keep same feeds- aim for minimum  6 bottles/day ; increase if feeling hungry   Continue solids   Return in 1mth     If you have any questions during regular office hours, please contact the Call Center at [phone]. For urgent concerns such as worsening symptoms, ask to have the [facility] GI Nurse paged. If acute urgent concerns arise after hours, you can call [phone] and ask to speak to the pediatric gastroenterologist on call.  Lab and Imaging orders may take up to 24 hours to be entered. It is most efficient if you use an [facility] site to have those completed.   Outside lab and imaging results should be faxed to [phone]. If you go to a lab outside of [facility] we will not automatically get those results. You will need to ask them to send them to us.  If you have clinic scheduling needs, please call the Call Center at [phone].  If you need to schedule Radiology tests, call [phone].  My Chart messages are for routine communication and questions and are usually answered within 48-72 hours. If you have an urgent concern or require sooner response, please call us.      
Pt ambulates to treatment area she states that she was seen yesterday at [facility] for her BP that was high and a low potassium. She was advised yesterday to go to the ED but pt does not drive in the dark so she waited until this morning. Pt notes that she has an appointment with her cardiologist at 2:00 pm today to discuss the BP.
Statement Selected

## 2022-01-28 ENCOUNTER — LABORATORY RESULT (OUTPATIENT)
Age: 57
End: 2022-01-28

## 2022-01-28 ENCOUNTER — APPOINTMENT (OUTPATIENT)
Dept: RADIATION ONCOLOGY | Facility: CLINIC | Age: 57
End: 2022-01-28
Payer: MEDICAID

## 2022-01-28 ENCOUNTER — APPOINTMENT (OUTPATIENT)
Dept: GYNECOLOGIC ONCOLOGY | Facility: CLINIC | Age: 57
End: 2022-01-28
Payer: MEDICAID

## 2022-01-28 VITALS
DIASTOLIC BLOOD PRESSURE: 76 MMHG | WEIGHT: 149 LBS | HEART RATE: 88 BPM | HEIGHT: 61 IN | SYSTOLIC BLOOD PRESSURE: 114 MMHG | BODY MASS INDEX: 28.13 KG/M2

## 2022-01-28 PROCEDURE — 99072 ADDL SUPL MATRL&STAF TM PHE: CPT

## 2022-01-28 PROCEDURE — 99212 OFFICE O/P EST SF 10 MIN: CPT

## 2022-01-28 PROCEDURE — 99214 OFFICE O/P EST MOD 30 MIN: CPT

## 2022-01-31 NOTE — REVIEW OF SYSTEMS
[Constipation: Grade 0] : Constipation: Grade 0 [Diarrhea: Grade 0] : Diarrhea: Grade 0 [Urinary Incontinence: Grade 0] : Urinary Incontinence: Grade 0  [Urinary Retention: Grade 0] : Urinary Retention: Grade 0 [Vaginal Stricture: Grade 2 - Vaginal narrowing and/or shortening not interfering with physical examination] : Vaginal Stricture: Grade 2 - Vaginal narrowing and/or shortening not interfering with physical examination [FreeTextEntry8] : not active

## 2022-01-31 NOTE — PHYSICAL EXAM
[General Appearance - In No Acute Distress] : in no acute distress [] : no respiratory distress [Abdomen Soft] : soft [Normal External Genitalia] : normal external genitalia  [FreeTextEntry1] : per Dr. Farrell [de-identified] : shortened canal cervix retracted into apex

## 2022-01-31 NOTE — HISTORY OF PRESENT ILLNESS
[FreeTextEntry1] : Ms. 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 She completed radiation therapy on 1/10/19. She tolerated treatment well. \par Post treatment PET was negative.\par Pst treatment PAP  and HrMRNA have resulted ASCUs with perststenco of High risk MRNA detected with no overt sign of recurrence.\par \par Today she feels well. Denies any pain. No urinary or bowel complaints. No vaginal bleeding or discharge. She is no tsexually active but does use the vaginal dilator.  She does report back pain which she relates to using heavy equipment at work and for which she is undergoing evaluation with her PCP.\par She is seen today togetherwith Dr. Farrell.  services utilized [id number]\par \par Pap: PAP 7/2021- ASCUS, HRHPV (+)

## 2022-02-10 ENCOUNTER — NON-APPOINTMENT (OUTPATIENT)
Age: 57
End: 2022-02-10

## 2022-02-10 LAB — CYTOLOGY CVX/VAG DOC THIN PREP: ABNORMAL

## 2022-07-29 ENCOUNTER — OUTPATIENT (OUTPATIENT)
Dept: OUTPATIENT SERVICES | Facility: HOSPITAL | Age: 57
LOS: 1 days | Discharge: HOME | End: 2022-07-29

## 2022-07-29 ENCOUNTER — APPOINTMENT (OUTPATIENT)
Dept: GYNECOLOGIC ONCOLOGY | Facility: CLINIC | Age: 57
End: 2022-07-29

## 2022-07-29 ENCOUNTER — NON-APPOINTMENT (OUTPATIENT)
Age: 57
End: 2022-07-29

## 2022-07-29 ENCOUNTER — APPOINTMENT (OUTPATIENT)
Dept: RADIATION ONCOLOGY | Facility: CLINIC | Age: 57
End: 2022-07-29

## 2022-07-29 VITALS
WEIGHT: 149 LBS | HEIGHT: 61 IN | HEART RATE: 66 BPM | BODY MASS INDEX: 28.13 KG/M2 | SYSTOLIC BLOOD PRESSURE: 128 MMHG | DIASTOLIC BLOOD PRESSURE: 74 MMHG

## 2022-07-29 DIAGNOSIS — D64.9 ANEMIA, UNSPECIFIED: ICD-10-CM

## 2022-07-29 DIAGNOSIS — Z90.49 ACQUIRED ABSENCE OF OTHER SPECIFIED PARTS OF DIGESTIVE TRACT: Chronic | ICD-10-CM

## 2022-07-29 DIAGNOSIS — C53.9 MALIGNANT NEOPLASM OF CERVIX UTERI, UNSPECIFIED: ICD-10-CM

## 2022-07-29 PROCEDURE — 99213 OFFICE O/P EST LOW 20 MIN: CPT

## 2022-07-29 PROCEDURE — 99212 OFFICE O/P EST SF 10 MIN: CPT

## 2022-07-29 NOTE — PHYSICAL EXAM
[Abnormal] : Examination of extremities for edema and/or varicosities: Abnormal [Absent] : CVAT: absent [Normal] : Recto-Vaginal Exam: Normal [Chaperone Present] : A chaperone was present in the examining room during all aspects of the physical examination [FreeTextEntry1] : AP [de-identified] : Trace edema [de-identified] : RT gabriella, atrophy [de-identified] : NGL, essentially flush with vagina; os stenotic. [de-identified] : The fundus was nonpalpable [de-identified] : The adnexae were nonpalapble [de-identified] : Smoothly indurated

## 2022-07-29 NOTE — HISTORY OF PRESENT ILLNESS
[FreeTextEntry1] : Stage IIIB Cx Ca (SCC)\par Rad Onc - Dr Mcmahan\par Med Onc Dr ISMAEL Jalloh\par PCP: Dr Abimael Finney, [address], [id number], [place], [id number]\par \par EBRT and Brachytherapy - completed external pelvic XRT (5100 cGy) on 12/28/18. She had sleeve placed on 12/27. She had ovoid brachytherapy boost to the cervix tumor (2800 cGy in 4 fractions HDR). She completed radiation therapy on 1/10/19.\par \par PI Device used throughout #[id number]\par \par She RTO feeling well. \par \par ROS: She confirms no VB or VD. No GI or  concerns. \par \par Pap: PAP Feb 2022 - ASCUS, HRHPV (+)\par Pap: PAP 7/2021- ASCUS, HRHPV (+)\par \par PET: Apr 2019 - resolution of avidity. \par \par HM\par BHM: pt agains confirms CBE by PCP.

## 2022-07-29 NOTE — DISCUSSION/SUMMARY
[Reviewed Clinical Lab Test(s)] : Results of clinical tests were reviewed. [Reviewed Radiology Report(s)] : Radiology reports were reviewed. [FreeTextEntry1] : I met with the pt with HOME. (PI #[id number]) during the visit.\par -We discussed her status.  A pap was repeated today. We disc her prior results, incl Pap and HPV. We plan continued active surveillance. \par -I have encouraged her to f/u with her PCP for HM incl BHM. \par -Her instructions were reviewed.\par  -All Q/A. \par -She will RTO in 3m in [facility] with HOME.

## 2022-07-31 NOTE — PHYSICAL EXAM
[General Appearance - In No Acute Distress] : in no acute distress [] : no respiratory distress [Abdomen Soft] : soft [Abdomen Tenderness] : non-tender [Normal External Genitalia] : normal external genitalia  [Supraclavicular Lymph Nodes Enlarged Bilaterally] : supraclavicular [Inguinal Lymph Nodes Enlarged Bilaterally] : inguinal [de-identified] : cervix retrated into vaginal apexx

## 2022-07-31 NOTE — HISTORY OF PRESENT ILLNESS
[FreeTextEntry1] : Ms. 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 She completed radiation therapy on 1/10/19. She tolerated treatment well.\par \par 11/19/19 - HPV MRNA Alpha detected, PAP ASC-US \par \par 4/14/19 - PET ABEL\par \par 2/24/20 - PAP negative, HPV MRNA Alpha detected\par \par 6/5/20 - PAP negative \par \par 12/4/20 - PAP negative \par \par 7/23/21 - PAP ASC-US HPV MRNA Alpha detected\par \par 1/28/22 - PAP ASC-US HPV MRNA Alpha detected \par \par  7/29/22 - Presents today for routine follow up in [facility]. \par \par \par \par Pap 12/14/20: NEG

## 2022-08-07 LAB — CYTOLOGY CVX/VAG DOC THIN PREP: ABNORMAL

## 2022-09-16 ENCOUNTER — NON-APPOINTMENT (OUTPATIENT)
Age: 57
End: 2022-09-16

## 2022-09-23 ENCOUNTER — APPOINTMENT (OUTPATIENT)
Dept: HEMATOLOGY ONCOLOGY | Facility: CLINIC | Age: 57
End: 2022-09-23

## 2022-09-23 ENCOUNTER — APPOINTMENT (OUTPATIENT)
Dept: GYNECOLOGIC ONCOLOGY | Facility: CLINIC | Age: 57
End: 2022-09-23

## 2022-09-23 ENCOUNTER — APPOINTMENT (OUTPATIENT)
Dept: RADIATION ONCOLOGY | Facility: CLINIC | Age: 57
End: 2022-09-23

## 2022-09-23 VITALS
HEART RATE: 71 BPM | BODY MASS INDEX: 27.89 KG/M2 | WEIGHT: 147.71 LBS | DIASTOLIC BLOOD PRESSURE: 82 MMHG | HEIGHT: 61 IN | SYSTOLIC BLOOD PRESSURE: 125 MMHG

## 2022-09-23 PROCEDURE — 99213 OFFICE O/P EST LOW 20 MIN: CPT

## 2022-09-23 PROCEDURE — 99212 OFFICE O/P EST SF 10 MIN: CPT

## 2022-09-25 LAB — HPV HIGH+LOW RISK DNA PNL CVX: DETECTED

## 2022-09-29 LAB — CYTOLOGY CVX/VAG DOC THIN PREP: ABNORMAL

## 2022-09-29 NOTE — PHYSICAL EXAM
[General Appearance - In No Acute Distress] : in no acute distress [] : no respiratory distress [Abdomen Soft] : soft [Abdomen Tenderness] : non-tender [Normal External Genitalia] : normal external genitalia  [Supraclavicular Lymph Nodes Enlarged Bilaterally] : supraclavicular [Inguinal Lymph Nodes Enlarged Bilaterally] : inguinal [Normal] : the sclera and conjunctiva were normal, pupils were equal in size, round, reactive to light and extraocular movements were intact. [de-identified] : cervix retreated into vaginal apex, exam c/w post-RT changes

## 2022-09-29 NOTE — HISTORY OF PRESENT ILLNESS
[FreeTextEntry1] : Ms. 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 She completed radiation therapy 1/10/19. She tolerated treatment well.\par She continues to use  the dilator.\par She was seen in July 2022 in [facility], she returns today for a routine follow up.

## 2022-12-16 ENCOUNTER — APPOINTMENT (OUTPATIENT)
Dept: HEMATOLOGY ONCOLOGY | Facility: CLINIC | Age: 57
End: 2022-12-16

## 2022-12-16 ENCOUNTER — APPOINTMENT (OUTPATIENT)
Dept: GYNECOLOGIC ONCOLOGY | Facility: CLINIC | Age: 57
End: 2022-12-16

## 2023-01-13 ENCOUNTER — APPOINTMENT (OUTPATIENT)
Dept: GYNECOLOGIC ONCOLOGY | Facility: CLINIC | Age: 58
End: 2023-01-13

## 2023-01-13 ENCOUNTER — APPOINTMENT (OUTPATIENT)
Dept: GYNECOLOGIC ONCOLOGY | Facility: CLINIC | Age: 58
End: 2023-01-13
Payer: MEDICAID

## 2023-01-13 VITALS
BODY MASS INDEX: 27.38 KG/M2 | HEIGHT: 61 IN | SYSTOLIC BLOOD PRESSURE: 121 MMHG | WEIGHT: 145 LBS | HEART RATE: 69 BPM | DIASTOLIC BLOOD PRESSURE: 74 MMHG

## 2023-01-13 PROCEDURE — 99213 OFFICE O/P EST LOW 20 MIN: CPT

## 2023-01-19 LAB — CYTOLOGY CVX/VAG DOC THIN PREP: ABNORMAL

## 2023-07-14 ENCOUNTER — APPOINTMENT (OUTPATIENT)
Dept: GYNECOLOGIC ONCOLOGY | Facility: CLINIC | Age: 58
End: 2023-07-14
Payer: MEDICAID

## 2023-07-14 VITALS
SYSTOLIC BLOOD PRESSURE: 105 MMHG | HEART RATE: 74 BPM | DIASTOLIC BLOOD PRESSURE: 69 MMHG | HEIGHT: 61 IN | BODY MASS INDEX: 27 KG/M2 | WEIGHT: 143 LBS

## 2023-07-14 PROCEDURE — 99213 OFFICE O/P EST LOW 20 MIN: CPT

## 2023-08-02 LAB — CYTOLOGY CVX/VAG DOC THIN PREP: ABNORMAL

## 2024-01-26 ENCOUNTER — APPOINTMENT (OUTPATIENT)
Dept: GYNECOLOGIC ONCOLOGY | Facility: CLINIC | Age: 59
End: 2024-01-26
Payer: MEDICAID

## 2024-01-26 ENCOUNTER — LABORATORY RESULT (OUTPATIENT)
Age: 59
End: 2024-01-26

## 2024-01-26 ENCOUNTER — APPOINTMENT (OUTPATIENT)
Dept: RADIATION ONCOLOGY | Facility: CLINIC | Age: 59
End: 2024-01-26
Payer: MEDICAID

## 2024-01-26 VITALS
HEIGHT: 61 IN | DIASTOLIC BLOOD PRESSURE: 77 MMHG | BODY MASS INDEX: 28.72 KG/M2 | WEIGHT: 152.13 LBS | HEART RATE: 69 BPM | SYSTOLIC BLOOD PRESSURE: 117 MMHG

## 2024-01-26 DIAGNOSIS — Z92.3 PERSONAL HISTORY OF IRRADIATION: ICD-10-CM

## 2024-01-26 DIAGNOSIS — C53.9 MALIGNANT NEOPLASM OF CERVIX UTERI, UNSPECIFIED: ICD-10-CM

## 2024-01-26 PROCEDURE — 99213 OFFICE O/P EST LOW 20 MIN: CPT

## 2024-01-26 PROCEDURE — 99212 OFFICE O/P EST SF 10 MIN: CPT

## 2024-01-28 PROBLEM — Z92.3 S/P RADIATION THERAPY: Status: ACTIVE | Noted: 2020-02-24

## 2024-01-28 NOTE — PHYSICAL EXAM
[Chaperone Present] : A chaperone was present in the examining room during all aspects of the physical examination [Abnormal] : Examination of extremities for edema and/or varicosities: Abnormal [Absent] : CVAT: absent [Normal] : Recto-Vaginal Exam: Normal [FreeTextEntry1] : DC [de-identified] : RT gabriella, atrophy [de-identified] : Trace edema [de-identified] : NGL, essentially flush with vagina; os stenotic. [de-identified] : The fundus was nonpalpable [de-identified] : The adnexae were nonpalapble [de-identified] : Smoothly indurated

## 2024-01-28 NOTE — DISCUSSION/SUMMARY
[Reviewed Clinical Lab Test(s)] : Results of clinical tests were reviewed. [Reviewed Radiology Report(s)] : Radiology reports were reviewed. [FreeTextEntry1] : I met with the pt and BB; she engaged PI and will report the #.  -We discussed her status.  A pap was repeated today; prior results disc with pt. We plan continued active surveillance.  -We disc the f/u with PCP, incl for BHM.    -Her instructions were reviewed. -All Q/A. -She will RTO in 6m in [facility] with BB.

## 2024-01-28 NOTE — HISTORY OF PRESENT ILLNESS
[FreeTextEntry1] : Stage IIIB Cx Ca (SCC) Rad Onc - Dr Mcmahan Med Onc Dr ISMAEL Jalloh PCP: Dr Abimael Finney, [address], [id number], [place], [id number]  EBRT and Brachytherapy - completed external pelvic XRT (5100 cGy) on 12/28/18. She had sleeve placed on 12/27. She had ovoid brachytherapy boost to the cervix tumor (2800 cGy in 4 fractions HDR). She completed radiation therapy on 1/10/19.  She RTO feeling well.   ROS: She confirms no VB, VD, or pain. No GI or  concerns.   Pap: PAP on 7/14/23 - ASCUS; disc with pt.  Pap: PAP on 1/13/23 - ASCUS Pap: PAP on Sept 2022 - ASCUS, HrHPV + Pap: PAP Jul 2022 - ASCUS Pap: PAP Feb 2022 - ASCUS, HRHPV (+) Pap: PAP 7/2021- ASCUS, HRHPV (+)  PET: Apr 2019 - resolution of avidity.   HM BHM: pt has confirmed CBE by PCP.

## 2024-02-02 LAB — CYTOLOGY CVX/VAG DOC THIN PREP: ABNORMAL

## 2024-03-01 PROBLEM — Z92.3 HISTORY OF BRACHYTHERAPY: Status: ACTIVE | Noted: 2024-03-01

## 2024-03-01 PROBLEM — Z92.3 H/O THERAPEUTIC RADIATION: Status: ACTIVE | Noted: 2024-03-01

## 2024-03-01 PROBLEM — C53.9 CERVICAL CARCINOMA: Status: ACTIVE | Noted: 2018-10-31

## 2024-03-01 NOTE — HISTORY OF PRESENT ILLNESS
[FreeTextEntry1] : Ms. 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 She completed radiation therapy 1/10/19. She tolerated treatment well.  [facility]  was used during this visit by phone(I have inadvertently erased the number) 1/26/23:She was seen in Sept 2022 in [facility], she returns today for a routine follow up.  She is seen together with Dr. Farrell  She reports using the vaginal dilator 3x/week She has no bleeding or vaginal discharge. She has no change in bowel/bladder.  LastPAP in july was ASCUS and MRNA alpha detected.  This has been the case sinceJuly,2019.

## 2024-03-07 ENCOUNTER — NON-APPOINTMENT (OUTPATIENT)
Age: 59
End: 2024-03-07

## 2024-06-25 PROBLEM — C53.9 CERVICAL ADENOCARCINOMA: Status: ACTIVE | Noted: 2021-07-26

## 2024-06-28 ENCOUNTER — APPOINTMENT (OUTPATIENT)
Dept: RADIATION ONCOLOGY | Facility: CLINIC | Age: 59
End: 2024-06-28

## 2024-06-28 ENCOUNTER — APPOINTMENT (OUTPATIENT)
Dept: GYNECOLOGIC ONCOLOGY | Facility: CLINIC | Age: 59
End: 2024-06-28
Payer: MEDICAID

## 2024-06-28 VITALS
SYSTOLIC BLOOD PRESSURE: 110 MMHG | BODY MASS INDEX: 28.7 KG/M2 | HEART RATE: 65 BPM | DIASTOLIC BLOOD PRESSURE: 74 MMHG | WEIGHT: 152 LBS | HEIGHT: 61 IN

## 2024-06-28 DIAGNOSIS — C53.9 MALIGNANT NEOPLASM OF CERVIX UTERI, UNSPECIFIED: ICD-10-CM

## 2024-06-28 DIAGNOSIS — R87.810 ATYPICAL SQUAMOUS CELLS OF UNDETERMINED SIGNIFICANCE ON CYTOLOGIC SMEAR OF CERVIX (ASC-US): ICD-10-CM

## 2024-06-28 DIAGNOSIS — Z92.3 PERSONAL HISTORY OF IRRADIATION: ICD-10-CM

## 2024-06-28 DIAGNOSIS — Z92.21 PERSONAL HISTORY OF IRRADIATION: ICD-10-CM

## 2024-06-28 DIAGNOSIS — R87.610 ATYPICAL SQUAMOUS CELLS OF UNDETERMINED SIGNIFICANCE ON CYTOLOGIC SMEAR OF CERVIX (ASC-US): ICD-10-CM

## 2024-06-28 PROCEDURE — 99213 OFFICE O/P EST LOW 20 MIN: CPT

## 2024-06-28 PROCEDURE — ZZZZZ: CPT

## 2024-06-28 PROCEDURE — G2211 COMPLEX E/M VISIT ADD ON: CPT | Mod: NC

## 2024-06-28 PROCEDURE — 99459 PELVIC EXAMINATION: CPT

## 2024-07-01 NOTE — PROVIDER CONTACT NOTE (OTHER) - REASON
Anesthesia Post-op Note    Patient: Cass Linares  Procedure(s) Performed: CYSTOSCOPY, WITH BLADDER WALL INJECTION (Bladder)   Anesthesia type: MAC    Vitals Value Taken Time   Temp 36.2 °C (97.2 °F) 07/01/24 1355   Pulse 69 07/01/24 1355   Resp 18 07/01/24 1355   SpO2 99 % 07/01/24 1355   /57 07/01/24 1355         Patient Location: PACU Phase 2  Post-op Vital Signs:stable  Level of Consciousness: awake and oriented  Respiratory Status: spontaneous ventilation and room air  Cardiovascular blood pressure returned to baseline  Hydration: euvolemic  Pain Management: adequately controlled  Vomiting: none  Nausea: None  Airway Patency:patent  Post-op Assessment: awake, alert, appropriately conversant, or baseline, no complications and patient tolerated procedure well      No notable events documented.                      
Low H and H
Patient passed lemon-sized clot with urination
Pt c/o feeling dizzy after returning to bed after using bathroom.
Urine is bright red, pt continues to have vaginal bleeding. Blood clots noted during this assessment.
Patient passed moderate sized clot while urinating

## 2024-07-23 PROBLEM — Z08 ENCOUNTER FOR FOLLOW-UP SURVEILLANCE OF CERVICAL CANCER: Status: ACTIVE | Noted: 2024-07-23

## 2024-12-27 ENCOUNTER — LABORATORY RESULT (OUTPATIENT)
Age: 59
End: 2024-12-27

## 2024-12-27 ENCOUNTER — NON-APPOINTMENT (OUTPATIENT)
Age: 59
End: 2024-12-27

## 2024-12-27 ENCOUNTER — APPOINTMENT (OUTPATIENT)
Dept: GYNECOLOGIC ONCOLOGY | Facility: CLINIC | Age: 59
End: 2024-12-27
Payer: MEDICAID

## 2024-12-27 ENCOUNTER — APPOINTMENT (OUTPATIENT)
Dept: RADIATION ONCOLOGY | Facility: CLINIC | Age: 59
End: 2024-12-27

## 2024-12-27 VITALS
HEART RATE: 65 BPM | DIASTOLIC BLOOD PRESSURE: 75 MMHG | HEIGHT: 61 IN | BODY MASS INDEX: 29.07 KG/M2 | SYSTOLIC BLOOD PRESSURE: 118 MMHG | WEIGHT: 154 LBS

## 2024-12-27 DIAGNOSIS — R30.0 DYSURIA: ICD-10-CM

## 2024-12-27 DIAGNOSIS — Z92.3 PERSONAL HISTORY OF IRRADIATION: ICD-10-CM

## 2024-12-27 DIAGNOSIS — C53.9 MALIGNANT NEOPLASM OF CERVIX UTERI, UNSPECIFIED: ICD-10-CM

## 2024-12-27 PROCEDURE — 99212 OFFICE O/P EST SF 10 MIN: CPT

## 2024-12-27 PROCEDURE — G2211 COMPLEX E/M VISIT ADD ON: CPT | Mod: NC

## 2024-12-27 PROCEDURE — 99214 OFFICE O/P EST MOD 30 MIN: CPT

## 2024-12-27 PROCEDURE — 99459 PELVIC EXAMINATION: CPT

## 2024-12-28 LAB
APPEARANCE: CLEAR
BILIRUBIN URINE: NEGATIVE
BLOOD URINE: ABNORMAL
COLOR: YELLOW
GLUCOSE QUALITATIVE U: NEGATIVE MG/DL
KETONES URINE: NEGATIVE MG/DL
LEUKOCYTE ESTERASE URINE: ABNORMAL
NITRITE URINE: NEGATIVE
PH URINE: 7
PROTEIN URINE: NEGATIVE MG/DL
SPECIFIC GRAVITY URINE: 1.01
UROBILINOGEN URINE: 0.2 MG/DL

## 2024-12-29 LAB — BACTERIA UR CULT: NORMAL

## 2025-04-11 NOTE — PATIENT PROFILE ADULT - PATIENT REPRESENTATIVE: ( YOU CAN CHOOSE ANY PERSON THAT CAN ASSIST YOU WITH YOUR HEALTH CARE PREFERENCES, DOES NOT HAVE TO BE A SPOUSE, IMMEDIATE FAMILY OR SIGNIFICANT OTHER/PARTNER)
SW received a call from [facility] ([facility]), pt accepted by Dr. Chase Levi to [facility] ([place]), to [phone]. N2N number is [phone]. Physicians ETA is [id number], no earlier ETA available per [facility].   yes

## 2025-05-23 ENCOUNTER — NON-APPOINTMENT (OUTPATIENT)
Age: 60
End: 2025-05-23

## 2025-05-23 ENCOUNTER — APPOINTMENT (OUTPATIENT)
Dept: RADIATION ONCOLOGY | Facility: CLINIC | Age: 60
End: 2025-05-23

## 2025-05-23 ENCOUNTER — APPOINTMENT (OUTPATIENT)
Dept: GYNECOLOGIC ONCOLOGY | Facility: CLINIC | Age: 60
End: 2025-05-23

## 2025-05-30 ENCOUNTER — NON-APPOINTMENT (OUTPATIENT)
Age: 60
End: 2025-05-30

## 2025-07-15 ENCOUNTER — APPOINTMENT (OUTPATIENT)
Dept: RADIATION ONCOLOGY | Facility: CLINIC | Age: 60
End: 2025-07-15
Payer: MEDICAID

## 2025-07-15 VITALS
WEIGHT: 154.98 LBS | SYSTOLIC BLOOD PRESSURE: 115 MMHG | OXYGEN SATURATION: 98 % | HEART RATE: 66 BPM | DIASTOLIC BLOOD PRESSURE: 76 MMHG | TEMPERATURE: 95.54 F | RESPIRATION RATE: 16 BRPM | BODY MASS INDEX: 29.28 KG/M2

## 2025-07-15 PROCEDURE — G2211 COMPLEX E/M VISIT ADD ON: CPT | Mod: NC

## 2025-07-15 PROCEDURE — 99212 OFFICE O/P EST SF 10 MIN: CPT | Mod: GC

## 2025-08-05 ENCOUNTER — RX ONLY (RX ONLY)
Age: 60
End: 2025-08-05

## 2025-08-05 ENCOUNTER — DOCTOR'S OFFICE (OUTPATIENT)
Facility: LOCATION | Age: 60
Setting detail: OPHTHALMOLOGY
End: 2025-08-05
Payer: MEDICAID

## 2025-08-05 DIAGNOSIS — H52.4: ICD-10-CM

## 2025-08-05 DIAGNOSIS — H25.11: ICD-10-CM

## 2025-08-05 DIAGNOSIS — D31.01: ICD-10-CM

## 2025-08-05 PROBLEM — H02.89 MEIBOMIAN GLAND DYSFUNCTION: Status: ACTIVE | Noted: 2025-08-05

## 2025-08-05 PROBLEM — H35.54 RPE CHANGES: Status: ACTIVE | Noted: 2025-08-05

## 2025-08-05 PROCEDURE — 92015 DETERMINE REFRACTIVE STATE: CPT | Performed by: OPHTHALMOLOGY

## 2025-08-05 PROCEDURE — 92014 COMPRE OPH EXAM EST PT 1/>: CPT | Performed by: OPHTHALMOLOGY

## 2025-08-05 ASSESSMENT — REFRACTION_AUTOREFRACTION
OD_CYLINDER: -1.50
OS_CYLINDER: -1.25
OS_AXIS: 155
OD_SPHERE: -0.75
OD_AXIS: 024
OS_SPHERE: -0.50

## 2025-08-05 ASSESSMENT — REFRACTION_MANIFEST
OS_AXIS: 150
OD_CYLINDER: +1.00
OD_AXIS: 025
OD_CYLINDER: -1.50
OS_SPHERE: -2.00
OD_ADD: +2.00
OD_SPHERE: -0.50
OS_ADD: +2.00
OD_VA1: 20/25+2
OS_SPHERE: -0.25
OD_VA1: 20/20
OD_AXIS: 115
OS_CYLINDER: -1.25
OD_SPHERE: -2.00
OS_CYLINDER: +1.00
OS_VA1: 20/20-2
OS_AXIS: 065
OS_VA1: 20/20

## 2025-08-05 ASSESSMENT — LID EXAM ASSESSMENTS
OD_COMMENTS: MEIBOMIAN GLAND INSPISSATION
OS_MEIBOMITIS: LUL
OD_MEIBOMITIS: RUL
OS_COMMENTS: MEIBOMIAN GLAND INSPISSATION

## 2025-08-05 ASSESSMENT — KERATOMETRY
OS_AXISANGLE_DEGREES: 081
OD_AXISANGLE_DEGREES: 101
OS_K1POWER_DIOPTERS: 44.50
OD_K1POWER_DIOPTERS: 44.75
OS_K2POWER_DIOPTERS: 46.25
OD_K2POWER_DIOPTERS: 46.50

## 2025-08-05 ASSESSMENT — VISUAL ACUITY
OD_BCVA: 20/25-
OS_BCVA: 20/40-1

## 2025-08-05 ASSESSMENT — CONFRONTATIONAL VISUAL FIELD TEST (CVF)
OS_FINDINGS: FULL
OD_FINDINGS: FULL